# Patient Record
Sex: FEMALE | Race: OTHER | HISPANIC OR LATINO | URBAN - METROPOLITAN AREA
[De-identification: names, ages, dates, MRNs, and addresses within clinical notes are randomized per-mention and may not be internally consistent; named-entity substitution may affect disease eponyms.]

---

## 2021-02-11 ENCOUNTER — INPATIENT (INPATIENT)
Facility: HOSPITAL | Age: 71
LOS: 11 days | Discharge: HOME HEALTH | DRG: 234 | End: 2021-02-23
Attending: THORACIC SURGERY (CARDIOTHORACIC VASCULAR SURGERY) | Admitting: STUDENT IN AN ORGANIZED HEALTH CARE EDUCATION/TRAINING PROGRAM
Payer: MEDICAID

## 2021-02-11 VITALS
DIASTOLIC BLOOD PRESSURE: 81 MMHG | HEART RATE: 82 BPM | SYSTOLIC BLOOD PRESSURE: 147 MMHG | TEMPERATURE: 98 F | RESPIRATION RATE: 18 BRPM | OXYGEN SATURATION: 99 %

## 2021-02-11 DIAGNOSIS — I21.4 NON-ST ELEVATION (NSTEMI) MYOCARDIAL INFARCTION: ICD-10-CM

## 2021-02-11 DIAGNOSIS — Z90.710 ACQUIRED ABSENCE OF BOTH CERVIX AND UTERUS: Chronic | ICD-10-CM

## 2021-02-11 LAB
ANION GAP SERPL CALC-SCNC: 14 MMOL/L — SIGNIFICANT CHANGE UP (ref 5–17)
APTT BLD: 30.3 SEC — SIGNIFICANT CHANGE UP (ref 27.5–35.5)
BUN SERPL-MCNC: 21 MG/DL — SIGNIFICANT CHANGE UP (ref 7–23)
CALCIUM SERPL-MCNC: 9.9 MG/DL — SIGNIFICANT CHANGE UP (ref 8.4–10.5)
CHLORIDE SERPL-SCNC: 104 MMOL/L — SIGNIFICANT CHANGE UP (ref 96–108)
CO2 SERPL-SCNC: 22 MMOL/L — SIGNIFICANT CHANGE UP (ref 22–31)
CREAT SERPL-MCNC: 0.84 MG/DL — SIGNIFICANT CHANGE UP (ref 0.5–1.3)
GLUCOSE BLDC GLUCOMTR-MCNC: 154 MG/DL — HIGH (ref 70–99)
GLUCOSE BLDC GLUCOMTR-MCNC: 276 MG/DL — HIGH (ref 70–99)
GLUCOSE SERPL-MCNC: 287 MG/DL — HIGH (ref 70–99)
HCT VFR BLD CALC: 43.3 % — SIGNIFICANT CHANGE UP (ref 34.5–45)
HGB BLD-MCNC: 13.6 G/DL — SIGNIFICANT CHANGE UP (ref 11.5–15.5)
INR BLD: 1.11 RATIO — SIGNIFICANT CHANGE UP (ref 0.88–1.16)
MCHC RBC-ENTMCNC: 28.9 PG — SIGNIFICANT CHANGE UP (ref 27–34)
MCHC RBC-ENTMCNC: 31.4 GM/DL — LOW (ref 32–36)
MCV RBC AUTO: 92.1 FL — SIGNIFICANT CHANGE UP (ref 80–100)
NRBC # BLD: 0 /100 WBCS — SIGNIFICANT CHANGE UP (ref 0–0)
PLATELET # BLD AUTO: 238 K/UL — SIGNIFICANT CHANGE UP (ref 150–400)
POTASSIUM SERPL-MCNC: 4.1 MMOL/L — SIGNIFICANT CHANGE UP (ref 3.5–5.3)
POTASSIUM SERPL-SCNC: 4.1 MMOL/L — SIGNIFICANT CHANGE UP (ref 3.5–5.3)
PROTHROM AB SERPL-ACNC: 13.2 SEC — SIGNIFICANT CHANGE UP (ref 10.6–13.6)
RBC # BLD: 4.7 M/UL — SIGNIFICANT CHANGE UP (ref 3.8–5.2)
RBC # FLD: 12.5 % — SIGNIFICANT CHANGE UP (ref 10.3–14.5)
RH IG SCN BLD-IMP: POSITIVE — SIGNIFICANT CHANGE UP
SODIUM SERPL-SCNC: 140 MMOL/L — SIGNIFICANT CHANGE UP (ref 135–145)
WBC # BLD: 7.2 K/UL — SIGNIFICANT CHANGE UP (ref 3.8–10.5)
WBC # FLD AUTO: 7.2 K/UL — SIGNIFICANT CHANGE UP (ref 3.8–10.5)

## 2021-02-11 PROCEDURE — 93010 ELECTROCARDIOGRAM REPORT: CPT

## 2021-02-11 PROCEDURE — 99222 1ST HOSP IP/OBS MODERATE 55: CPT

## 2021-02-11 PROCEDURE — 93458 L HRT ARTERY/VENTRICLE ANGIO: CPT | Mod: 26

## 2021-02-11 PROCEDURE — 99152 MOD SED SAME PHYS/QHP 5/>YRS: CPT

## 2021-02-11 PROCEDURE — 99223 1ST HOSP IP/OBS HIGH 75: CPT | Mod: 25

## 2021-02-11 RX ORDER — GLUCAGON INJECTION, SOLUTION 0.5 MG/.1ML
1 INJECTION, SOLUTION SUBCUTANEOUS ONCE
Refills: 0 | Status: DISCONTINUED | OUTPATIENT
Start: 2021-02-11 | End: 2021-02-13

## 2021-02-11 RX ORDER — DEXTROSE 50 % IN WATER 50 %
15 SYRINGE (ML) INTRAVENOUS ONCE
Refills: 0 | Status: DISCONTINUED | OUTPATIENT
Start: 2021-02-11 | End: 2021-02-17

## 2021-02-11 RX ORDER — DEXTROSE 50 % IN WATER 50 %
12.5 SYRINGE (ML) INTRAVENOUS ONCE
Refills: 0 | Status: DISCONTINUED | OUTPATIENT
Start: 2021-02-11 | End: 2021-02-13

## 2021-02-11 RX ORDER — DEXTROSE 50 % IN WATER 50 %
25 SYRINGE (ML) INTRAVENOUS ONCE
Refills: 0 | Status: DISCONTINUED | OUTPATIENT
Start: 2021-02-11 | End: 2021-02-17

## 2021-02-11 RX ORDER — INSULIN LISPRO 100/ML
6 VIAL (ML) SUBCUTANEOUS
Refills: 0 | Status: DISCONTINUED | OUTPATIENT
Start: 2021-02-11 | End: 2021-02-13

## 2021-02-11 RX ORDER — ATORVASTATIN CALCIUM 80 MG/1
40 TABLET, FILM COATED ORAL AT BEDTIME
Refills: 0 | Status: DISCONTINUED | OUTPATIENT
Start: 2021-02-11 | End: 2021-02-17

## 2021-02-11 RX ORDER — LEVOTHYROXINE SODIUM 125 MCG
100 TABLET ORAL DAILY
Refills: 0 | Status: DISCONTINUED | OUTPATIENT
Start: 2021-02-11 | End: 2021-02-17

## 2021-02-11 RX ORDER — METOPROLOL TARTRATE 50 MG
25 TABLET ORAL
Refills: 0 | Status: DISCONTINUED | OUTPATIENT
Start: 2021-02-11 | End: 2021-02-13

## 2021-02-11 RX ORDER — INSULIN LISPRO 100/ML
VIAL (ML) SUBCUTANEOUS
Refills: 0 | Status: DISCONTINUED | OUTPATIENT
Start: 2021-02-11 | End: 2021-02-17

## 2021-02-11 RX ORDER — ATORVASTATIN CALCIUM 80 MG/1
1 TABLET, FILM COATED ORAL
Qty: 0 | Refills: 0 | DISCHARGE

## 2021-02-11 RX ORDER — METFORMIN HYDROCHLORIDE 850 MG/1
1 TABLET ORAL
Qty: 0 | Refills: 0 | DISCHARGE

## 2021-02-11 RX ORDER — INSULIN LISPRO 100/ML
VIAL (ML) SUBCUTANEOUS AT BEDTIME
Refills: 0 | Status: DISCONTINUED | OUTPATIENT
Start: 2021-02-11 | End: 2021-02-17

## 2021-02-11 RX ORDER — SODIUM CHLORIDE 9 MG/ML
1000 INJECTION, SOLUTION INTRAVENOUS
Refills: 0 | Status: DISCONTINUED | OUTPATIENT
Start: 2021-02-11 | End: 2021-02-13

## 2021-02-11 RX ORDER — DORZOLAMIDE HYDROCHLORIDE 20 MG/ML
1 SOLUTION/ DROPS OPHTHALMIC
Qty: 0 | Refills: 0 | DISCHARGE

## 2021-02-11 RX ORDER — LOSARTAN POTASSIUM 100 MG/1
50 TABLET, FILM COATED ORAL DAILY
Refills: 0 | Status: DISCONTINUED | OUTPATIENT
Start: 2021-02-11 | End: 2021-02-13

## 2021-02-11 RX ORDER — DEXTROSE 50 % IN WATER 50 %
25 SYRINGE (ML) INTRAVENOUS ONCE
Refills: 0 | Status: DISCONTINUED | OUTPATIENT
Start: 2021-02-11 | End: 2021-02-13

## 2021-02-11 RX ORDER — CYCLOBENZAPRINE HYDROCHLORIDE 10 MG/1
1 TABLET, FILM COATED ORAL
Qty: 0 | Refills: 0 | DISCHARGE

## 2021-02-11 RX ORDER — ASPIRIN/CALCIUM CARB/MAGNESIUM 324 MG
81 TABLET ORAL DAILY
Refills: 0 | Status: DISCONTINUED | OUTPATIENT
Start: 2021-02-11 | End: 2021-02-17

## 2021-02-11 RX ORDER — CYCLOBENZAPRINE HYDROCHLORIDE 10 MG/1
5 TABLET, FILM COATED ORAL AT BEDTIME
Refills: 0 | Status: DISCONTINUED | OUTPATIENT
Start: 2021-02-11 | End: 2021-02-17

## 2021-02-11 RX ORDER — DORZOLAMIDE HYDROCHLORIDE 20 MG/ML
1 SOLUTION/ DROPS OPHTHALMIC
Refills: 0 | Status: DISCONTINUED | OUTPATIENT
Start: 2021-02-11 | End: 2021-02-17

## 2021-02-11 RX ORDER — INSULIN LISPRO 100/ML
8 VIAL (ML) SUBCUTANEOUS
Refills: 0 | Status: DISCONTINUED | OUTPATIENT
Start: 2021-02-11 | End: 2021-02-13

## 2021-02-11 RX ORDER — INSULIN GLARGINE 100 [IU]/ML
8 INJECTION, SOLUTION SUBCUTANEOUS AT BEDTIME
Refills: 0 | Status: DISCONTINUED | OUTPATIENT
Start: 2021-02-11 | End: 2021-02-13

## 2021-02-11 RX ADMIN — Medication 25 MILLIGRAM(S): at 21:44

## 2021-02-11 RX ADMIN — Medication 1: at 21:44

## 2021-02-11 RX ADMIN — DORZOLAMIDE HYDROCHLORIDE 1 DROP(S): 20 SOLUTION/ DROPS OPHTHALMIC at 21:44

## 2021-02-11 RX ADMIN — ATORVASTATIN CALCIUM 40 MILLIGRAM(S): 80 TABLET, FILM COATED ORAL at 21:44

## 2021-02-11 RX ADMIN — INSULIN GLARGINE 8 UNIT(S): 100 INJECTION, SOLUTION SUBCUTANEOUS at 21:43

## 2021-02-11 NOTE — H&P CARDIOLOGY - HISTORY OF PRESENT ILLNESS
70 year old female (denies implanted devices) with significant PMHx of former 5 pack year cigarette smoker, T2DM (last HgA1c unknown, managed by endocrinologist in [place], denies complications), hypothyroidism, HTN, HLD admitted to [facility] on 2/8/2021 with NSTEMI and transferred to [facility] for LHC with Dr. Duggan. Upon admission to [facility], patient c/o chest pain that radiated to jaw and both arms that began 8 days ago. She had 4 episodes that began with mild exertion and progressively became worse and started with rest. Patient travels frequently back and forth from [place], most recent trip from [place] was 3 months ago. No troponin levels available in transfer documents. LHC revealed 80-90% stenosis to LAD, 80-90% stenosis to prox diag, 80-90% to prox Cx, 70-80 to mid/distal RCA and recommended for CTS eval for CABG.     2/11/2021 WBC 6.84, HGB 12.8, HCT 38.4, , , K 4.1, Cl 108, BUN 20, Cr 0.8, GFR >60  70 year old female (denies implanted devices) with significant PMHx of former 5 pack year cigarette smoker, T2DM (last HgA1c unknown, managed by endocrinologist in [place], denies complications), hypothyroidism, HTN, HLD admitted to [facility] on 2/8/2021 with NSTEMI and transferred to [facility] for LHC with Dr. Duggan. Upon admission to [facility], patient c/o chest pain that radiated to jaw and both arms that began 8 days ago. She had 4 episodes that began with mild exertion and progressively became worse and started with rest. Patient travels frequently back and forth from [place], most recent trip from [place] was 3 months ago. No troponin levels available in transfer documents. LHC revealed 80-90% stenosis to LAD, 80-90% stenosis to prox diag, 80-90% to prox Cx, 70-80 to mid/distal RCA and recommended for CTS eval for CABG.     2/11/2021 WBC 6.84, HGB 12.8, HCT 38.4, , , K 4.1, Cl 108, BUN 20, Cr 0.8, GFR >60        [place] [id number]

## 2021-02-11 NOTE — H&P CARDIOLOGY - ATTENDING COMMENTS
Patient with high risk factors and EKG showing diffuse ST depressions suspicious for global ischemia. Recommend cardiac catheterization.

## 2021-02-12 DIAGNOSIS — I10 ESSENTIAL (PRIMARY) HYPERTENSION: ICD-10-CM

## 2021-02-12 DIAGNOSIS — E11.9 TYPE 2 DIABETES MELLITUS WITHOUT COMPLICATIONS: ICD-10-CM

## 2021-02-12 DIAGNOSIS — I25.10 ATHEROSCLEROTIC HEART DISEASE OF NATIVE CORONARY ARTERY WITHOUT ANGINA PECTORIS: ICD-10-CM

## 2021-02-12 DIAGNOSIS — E78.5 HYPERLIPIDEMIA, UNSPECIFIED: ICD-10-CM

## 2021-02-12 LAB
A1C WITH ESTIMATED AVERAGE GLUCOSE RESULT: 9.2 % — HIGH (ref 4–5.6)
ALBUMIN SERPL ELPH-MCNC: 3.8 G/DL — SIGNIFICANT CHANGE UP (ref 3.3–5)
ALP SERPL-CCNC: 71 U/L — SIGNIFICANT CHANGE UP (ref 40–120)
ALT FLD-CCNC: 47 U/L — HIGH (ref 10–45)
ANION GAP SERPL CALC-SCNC: 12 MMOL/L — SIGNIFICANT CHANGE UP (ref 5–17)
APPEARANCE UR: CLEAR — SIGNIFICANT CHANGE UP
APTT BLD: 129.6 SEC — CRITICAL HIGH (ref 27.5–35.5)
APTT BLD: 163.8 SEC — CRITICAL HIGH (ref 27.5–35.5)
APTT BLD: 61.4 SEC — HIGH (ref 27.5–35.5)
AST SERPL-CCNC: 61 U/L — HIGH (ref 10–40)
BILIRUB SERPL-MCNC: 0.3 MG/DL — SIGNIFICANT CHANGE UP (ref 0.2–1.2)
BILIRUB UR-MCNC: NEGATIVE — SIGNIFICANT CHANGE UP
BUN SERPL-MCNC: 21 MG/DL — SIGNIFICANT CHANGE UP (ref 7–23)
CALCIUM SERPL-MCNC: 9.2 MG/DL — SIGNIFICANT CHANGE UP (ref 8.4–10.5)
CHLORIDE SERPL-SCNC: 105 MMOL/L — SIGNIFICANT CHANGE UP (ref 96–108)
CO2 SERPL-SCNC: 21 MMOL/L — LOW (ref 22–31)
COLOR SPEC: YELLOW — SIGNIFICANT CHANGE UP
CREAT SERPL-MCNC: 0.68 MG/DL — SIGNIFICANT CHANGE UP (ref 0.5–1.3)
DIFF PNL FLD: NEGATIVE — SIGNIFICANT CHANGE UP
ESTIMATED AVERAGE GLUCOSE: 217 MG/DL — HIGH (ref 68–114)
FIBRINOGEN PPP-MCNC: 675 MG/DL — HIGH (ref 290–520)
GLUCOSE BLDC GLUCOMTR-MCNC: 203 MG/DL — HIGH (ref 70–99)
GLUCOSE BLDC GLUCOMTR-MCNC: 271 MG/DL — HIGH (ref 70–99)
GLUCOSE BLDC GLUCOMTR-MCNC: 276 MG/DL — HIGH (ref 70–99)
GLUCOSE BLDC GLUCOMTR-MCNC: 349 MG/DL — HIGH (ref 70–99)
GLUCOSE SERPL-MCNC: 266 MG/DL — HIGH (ref 70–99)
GLUCOSE UR QL: ABNORMAL
HCT VFR BLD CALC: 37.8 % — SIGNIFICANT CHANGE UP (ref 34.5–45)
HGB BLD-MCNC: 12.3 G/DL — SIGNIFICANT CHANGE UP (ref 11.5–15.5)
KETONES UR-MCNC: SIGNIFICANT CHANGE UP
LEUKOCYTE ESTERASE UR-ACNC: NEGATIVE — SIGNIFICANT CHANGE UP
MCHC RBC-ENTMCNC: 29.9 PG — SIGNIFICANT CHANGE UP (ref 27–34)
MCHC RBC-ENTMCNC: 32.5 GM/DL — SIGNIFICANT CHANGE UP (ref 32–36)
MCV RBC AUTO: 91.7 FL — SIGNIFICANT CHANGE UP (ref 80–100)
MRSA PCR RESULT.: SIGNIFICANT CHANGE UP
NITRITE UR-MCNC: NEGATIVE — SIGNIFICANT CHANGE UP
NRBC # BLD: 0 /100 WBCS — SIGNIFICANT CHANGE UP (ref 0–0)
NT-PROBNP SERPL-SCNC: 35 PG/ML — SIGNIFICANT CHANGE UP (ref 0–300)
PA ADP PRP-ACNC: 94 PRU — LOW (ref 194–417)
PH UR: 6.5 — SIGNIFICANT CHANGE UP (ref 5–8)
PLATELET # BLD AUTO: 257 K/UL — SIGNIFICANT CHANGE UP (ref 150–400)
POTASSIUM SERPL-MCNC: 4.1 MMOL/L — SIGNIFICANT CHANGE UP (ref 3.5–5.3)
POTASSIUM SERPL-SCNC: 4.1 MMOL/L — SIGNIFICANT CHANGE UP (ref 3.5–5.3)
PROT SERPL-MCNC: 7.7 G/DL — SIGNIFICANT CHANGE UP (ref 6–8.3)
PROT UR-MCNC: ABNORMAL
RBC # BLD: 4.12 M/UL — SIGNIFICANT CHANGE UP (ref 3.8–5.2)
RBC # FLD: 12.7 % — SIGNIFICANT CHANGE UP (ref 10.3–14.5)
S AUREUS DNA NOSE QL NAA+PROBE: SIGNIFICANT CHANGE UP
SARS-COV-2 IGG SERPL QL IA: POSITIVE
SARS-COV-2 IGM SERPL IA-ACNC: 64 AU/ML — HIGH
SARS-COV-2 RNA SPEC QL NAA+PROBE: SIGNIFICANT CHANGE UP
SODIUM SERPL-SCNC: 138 MMOL/L — SIGNIFICANT CHANGE UP (ref 135–145)
SP GR SPEC: 1.03 — HIGH (ref 1.01–1.02)
T3 SERPL-MCNC: 98 NG/DL — SIGNIFICANT CHANGE UP (ref 80–200)
T4 AB SER-ACNC: 10.3 UG/DL — SIGNIFICANT CHANGE UP (ref 4.6–12)
TSH SERPL-MCNC: 6.26 UIU/ML — HIGH (ref 0.27–4.2)
UROBILINOGEN FLD QL: ABNORMAL
WBC # BLD: 8.61 K/UL — SIGNIFICANT CHANGE UP (ref 3.8–10.5)
WBC # FLD AUTO: 8.61 K/UL — SIGNIFICANT CHANGE UP (ref 3.8–10.5)

## 2021-02-12 PROCEDURE — 93306 TTE W/DOPPLER COMPLETE: CPT | Mod: 26

## 2021-02-12 PROCEDURE — 71046 X-RAY EXAM CHEST 2 VIEWS: CPT | Mod: 26

## 2021-02-12 PROCEDURE — 99253 IP/OBS CNSLTJ NEW/EST LOW 45: CPT

## 2021-02-12 PROCEDURE — 99231 SBSQ HOSP IP/OBS SF/LOW 25: CPT

## 2021-02-12 PROCEDURE — 93880 EXTRACRANIAL BILAT STUDY: CPT | Mod: 26

## 2021-02-12 RX ORDER — HEPARIN SODIUM 5000 [USP'U]/ML
INJECTION INTRAVENOUS; SUBCUTANEOUS
Qty: 25000 | Refills: 0 | Status: DISCONTINUED | OUTPATIENT
Start: 2021-02-12 | End: 2021-02-13

## 2021-02-12 RX ORDER — HEPARIN SODIUM 5000 [USP'U]/ML
3000 INJECTION INTRAVENOUS; SUBCUTANEOUS EVERY 6 HOURS
Refills: 0 | Status: DISCONTINUED | OUTPATIENT
Start: 2021-02-12 | End: 2021-02-13

## 2021-02-12 RX ORDER — HEPARIN SODIUM 5000 [USP'U]/ML
6000 INJECTION INTRAVENOUS; SUBCUTANEOUS EVERY 6 HOURS
Refills: 0 | Status: DISCONTINUED | OUTPATIENT
Start: 2021-02-12 | End: 2021-02-13

## 2021-02-12 RX ADMIN — Medication 6 UNIT(S): at 07:50

## 2021-02-12 RX ADMIN — Medication 3: at 07:51

## 2021-02-12 RX ADMIN — Medication 100 MICROGRAM(S): at 05:44

## 2021-02-12 RX ADMIN — HEPARIN SODIUM 0 UNIT(S)/HR: 5000 INJECTION INTRAVENOUS; SUBCUTANEOUS at 07:10

## 2021-02-12 RX ADMIN — DORZOLAMIDE HYDROCHLORIDE 1 DROP(S): 20 SOLUTION/ DROPS OPHTHALMIC at 09:38

## 2021-02-12 RX ADMIN — INSULIN GLARGINE 8 UNIT(S): 100 INJECTION, SOLUTION SUBCUTANEOUS at 22:17

## 2021-02-12 RX ADMIN — Medication 8 UNIT(S): at 12:03

## 2021-02-12 RX ADMIN — HEPARIN SODIUM 1100 UNIT(S)/HR: 5000 INJECTION INTRAVENOUS; SUBCUTANEOUS at 08:16

## 2021-02-12 RX ADMIN — Medication 2: at 17:42

## 2021-02-12 RX ADMIN — HEPARIN SODIUM 900 UNIT(S)/HR: 5000 INJECTION INTRAVENOUS; SUBCUTANEOUS at 17:15

## 2021-02-12 RX ADMIN — DORZOLAMIDE HYDROCHLORIDE 1 DROP(S): 20 SOLUTION/ DROPS OPHTHALMIC at 22:17

## 2021-02-12 RX ADMIN — HEPARIN SODIUM 1300 UNIT(S)/HR: 5000 INJECTION INTRAVENOUS; SUBCUTANEOUS at 00:19

## 2021-02-12 RX ADMIN — Medication 25 MILLIGRAM(S): at 05:44

## 2021-02-12 RX ADMIN — Medication 1: at 22:18

## 2021-02-12 RX ADMIN — HEPARIN SODIUM 900 UNIT(S)/HR: 5000 INJECTION INTRAVENOUS; SUBCUTANEOUS at 23:48

## 2021-02-12 RX ADMIN — ATORVASTATIN CALCIUM 40 MILLIGRAM(S): 80 TABLET, FILM COATED ORAL at 22:17

## 2021-02-12 RX ADMIN — Medication 81 MILLIGRAM(S): at 05:45

## 2021-02-12 RX ADMIN — Medication 30 MILLILITER(S): at 22:36

## 2021-02-12 RX ADMIN — LOSARTAN POTASSIUM 50 MILLIGRAM(S): 100 TABLET, FILM COATED ORAL at 05:44

## 2021-02-12 RX ADMIN — Medication 25 MILLIGRAM(S): at 17:52

## 2021-02-12 RX ADMIN — Medication 4: at 11:59

## 2021-02-12 RX ADMIN — HEPARIN SODIUM 0 UNIT(S)/HR: 5000 INJECTION INTRAVENOUS; SUBCUTANEOUS at 16:13

## 2021-02-12 NOTE — PROGRESS NOTE ADULT - PROBLEM SELECTOR PLAN 1
S/p LHC ALMA x S/P LHC revealed 80-90% stenosis to LAD, 80-90% stenosis to prox diag, 80-90% to prox Cx, 70-80 to mid/distal RCA and recommended for CTS eval for CABG.

## 2021-02-12 NOTE — CONSULT NOTE ADULT - PROBLEM SELECTOR RECOMMENDATION 9
Pre op Cardiac surgery work up in progress   Continue with ASA 81 mg PO daily   Continue with Lopressor 25 mg PO BID   Continue with Atorvastatin 40 mg PO HS   TTE   MRSA/ MSSA Nasal swab   Check P2Y12 in AM   Type and Screen x 2 pending   Continue on Heparin gtt for ACS   Plan for Cardiac Surgery in with Dr. Adhikari on Wednesday 2/17 Pre op Cardiac surgery work up in progress   Continue with ASA 81 mg PO daily   Continue with Lopressor 25 mg PO BID   Continue with Atorvastatin 40 mg PO HS   TTE   MRSA/ MSSA Nasal swab   Check P2Y12 in AM   Type and Screen x 2 pending   Continue on Heparin gtt for ACS   Plan for Cardiac Surgery in with Dr. Adhikari on Wednesday 2/17 2nd case

## 2021-02-12 NOTE — PROGRESS NOTE ADULT - ASSESSMENT
70 year old female (denies implanted devices) with significant PMHx of former 5 pack year cigarette smoker, T2DM (last HgA1c unknown, managed by endocrinologist in [place], denies complications), hypothyroidism, HTN, HLD admitted to [facility] on 2/8/2021 with NSTEMI and transferred to [facility] for LHC with Dr. Duggan.   S/P LHC revealed 80-90% stenosis to LAD, 80-90% stenosis to prox diag, 80-90% to prox Cx, 70-80 to mid/distal RCA and recommended for CTS eval for CABG.

## 2021-02-12 NOTE — CONSULT NOTE ADULT - SUBJECTIVE AND OBJECTIVE BOX
History of Present Illness:    69 yo Female (denies implanted devices) with significant PMHx of former 5 pack year cigarette smoker, T2DM (last HgA1c unknown, managed by endocrinologist in [place], denies complications), hypothyroidism, HTN, HLD admitted to [facility] on 2/8/2021 with NSTEMI and transferred to [facility] for LHC with Dr. Duggan. Upon admission to [facility], patient c/o chest pain that radiated to jaw and both arms that began 8 days ago. She had 4 episodes that began with mild exertion and progressively became worse and started with rest. Patient travels frequently back and forth from [place], most recent trip from [place] was 3 months ago. No troponin levels available in transfer documents. LHC revealed 80-90% stenosis to LAD, 80-90% stenosis to prox diag, 80-90% to prox Cx, 70-80 to mid/distal RCA. CTS consult called to evaluate patient for cardiac surgery.    	  Past Medical History  HLD (hyperlipidemia)    Hypothyroidism    DM (diabetes mellitus)    HTN (hypertension)    Past Surgical History  S/P VLADISLAV (total abdominal hysterectomy)    MEDICATIONS  (STANDING):  aspirin enteric coated 81 milliGRAM(s) Oral daily  atorvastatin 40 milliGRAM(s) Oral at bedtime  dorzolamide 2% Ophthalmic Solution 1 Drop(s) Both EYES <User Schedule>  glucagon  Injectable 1 milliGRAM(s) IntraMuscular once  heparin  Infusion.  Unit(s)/Hr (13 mL/Hr) IV Continuous <Continuous>  insulin glargine Injectable (LANTUS) 8 Unit(s) SubCutaneous at bedtime  insulin lispro (ADMELOG) corrective regimen sliding scale SubCutaneous three times a day before meals  insulin lispro (ADMELOG) corrective regimen sliding scale SubCutaneous at bedtime  insulin lispro Injectable (ADMELOG) 6 Unit(s) SubCutaneous before breakfast  insulin lispro Injectable (ADMELOG) 8 Unit(s) SubCutaneous before lunch  levothyroxine 100 MICROGram(s) Oral daily  losartan 50 milliGRAM(s) Oral daily  metoprolol tartrate 25 milliGRAM(s) Oral two times a day    MEDICATIONS  (PRN):  cyclobenzaprine 5 milliGRAM(s) Oral at bedtime PRN Muscle Spasm  heparin Injectable 6000 Unit(s) IV Push every 6 hours PRN For aPTT less than 40  heparin Injectable 3000 Unit(s) IV Push every 6 hours PRN For aPTT between 40 - 57    Antiplatelet therapy: Brilinta Load                         Last dose/amt: 2/11/21     Allergies: No Known Allergies    SOCIAL HISTORY:  Former Smoker: [X ] Yes  [ ] No        PACK YEARS:  0.5 x 10 years / 5 PPY                      WHEN QUIT? 30 years ago   ETOH use: [ ] Yes  [X ] No              FREQUENCY / QUANTITY:  Ilicit Drug use:  [ ] Yes  [x ] No  Occupation:  Live with:   Assist device use: Denies     Relevant Family History  FAMILY HISTORY:  FH: heart disease (Father)    Review of Systems  GENERAL:  Fevers[] chills[] sweats[] fatigue[] weight loss[] weight gain []                                        NEURO:  parathesias[] seizures []  syncope []  confusion []                                                                                  EYES: glasses[]  blurry vision[]  discharge[] pain[] glaucoma []                                                                            ENMT:  difficulty hearing []  vertigo[]  dysphagia[] epistaxis[] recent dental work []                                      CV:  chest pain[] palpitations[] PONCE [] diaphoresis [] edema[]                                                                                             RESPIRATORY:  wheezing[] SOB[] cough [] sputum[] hemoptysis[]                                                                    GI:  nausea[]  vomiting []  diarrhea[] constipation [] melena []                                                                        : hematuria[ ]  dysuria[ ] urgency[] incontinence[]                                                                                              MUSCULOSKELETAL  arthritis[ ]  joint swelling [ ] muscle weakness [ ]                                                                  SKIN/BREAST:  rash[ ] itching [ ]  hair loss[ ] masses[ ]                                                                                                PSYCH:  dementia [ ] depression [ ] anxiety[ ]                                                                                                                  HEME/LYMPH:  bruises easily[ ] enlarged lymph nodes[ ] tender lymph nodes[ ]                                                 ENDOCRINE:  cold intolerance[ ] heat intolerance[ ] polydipsia[ ]                                                                              PHYSICAL EXAM  Vital Signs Last 24 Hrs  T(C): 36.9 (12 Feb 2021 06:12), Max: 36.9 (12 Feb 2021 06:12)  T(F): 98.5 (12 Feb 2021 06:12), Max: 98.5 (12 Feb 2021 06:12)  HR: 79 (12 Feb 2021 06:12) (72 - 88)  BP: 141/75 (12 Feb 2021 06:12) (117/104 - 152/74)  BP(mean): --  RR: 16 (12 Feb 2021 06:12) (16 - 18)  SpO2: 97% (12 Feb 2021 06:12) (96% - 100%)    General: Well nourished, well developed, no acute distress.                                                         Neuro: Normal exam oriented to person/place & time with no focal motor or sensory  deficits.                    Eyes: Normal exam of conjunctiva & lids, pupils equally reactive.   ENT: Normal exam of nasal/oral mucosa with absence of cyanosis.   Neck: Normal exam of jugular veins, trachea & thyroid.   Chest: Normal lung exam with good air movement absence of wheezes, rales, or rhonchi:                                                                          CV:  Auscultation: normal [ ] S3[ ] S4[ ] Irregular [ ] Rub[ ] Clicks[ ]  Murmurs none:[ ]systolic [ ]  diastolic [ ] holosystolic [ ]  Carotids: No Bruits[ ] Other____________ Abdominal Aorta: normal [ ] nonpalpable[ ]                                                                         GI: Normal exam of abdomen, liver & spleen with no noted masses or tenderness.  (+) BS X 4 Quadrants, Nontender / Non Distended.                                                                                             Extremities: Normal no evidence of cyanosis or deformity Edema: none[ ]trace[ ]1+[ ]2+[ ]3+[ ]4+[ ]  Lower Extremity Pulses: Right[ ] Left[ ]Varicosities[ ]  SKIN : Normal exam to inspection & palpation.                                                           LABS:                        12.3   8.61  )-----------( 257      ( 12 Feb 2021 06:25 )             37.8     02-12    138  |  105  |  21  ----------------------------<  266<H>  4.1   |  21<L>  |  0.68    Ca    9.2      12 Feb 2021 06:25    TPro  7.7  /  Alb  3.8  /  TBili  0.3  /  DBili  x   /  AST  61<H>  /  ALT  47<H>  /  AlkPhos  71  02-12    PT/INR - ( 11 Feb 2021 23:25 )   PT: 13.2 sec;   INR: 1.11 ratio         PTT - ( 12 Feb 2021 06:25 )  PTT:163.8 sec    Cardiac Cath: from: Cardiac Cath Lab - Adult (02.11.21 @ 17:23) >  LM:   --  LM: Angiography showed minor luminal irregularities with no flow  limiting lesions.  LAD:   --  Mid LAD: There was a 70 % stenosis.  --  Distal LAD: There was a 80% stenosis.  --  D1: There was a 90 % stenosis at the ostium of the vessel segment.  CX:   --  Proximal circumflex: There was a 70 % stenosis.  RCA:   --  Distal RCA: There was a 90 % stenosis.    TTE / JUAN CARLOS:           History of Present Illness:    71 yo Female (denies implanted devices) with significant PMHx of former 5 pack year cigarette smoker, T2DM (last HgA1c unknown, managed by endocrinologist in [place], denies complications), hypothyroidism, HTN, HLD admitted to [facility] on 2/8/2021 with NSTEMI and transferred to [facility] for LHC with Dr. Duggan. Upon admission to [facility], patient c/o chest pain that radiated to jaw and both arms that began 8 days ago. She had 4 episodes that began with mild exertion and progressively became worse and started with rest. Patient travels frequently back and forth from [place], most recent trip from [place] was 3 months ago. No troponin levels available in transfer documents. LHC revealed 80-90% stenosis to LAD, 80-90% stenosis to prox diag, 80-90% to prox Cx, 70-80 to mid/distal RCA. CTS consult called to evaluate patient for cardiac surgery.    	  Past Medical History  HLD (hyperlipidemia)    Hypothyroidism    DM (diabetes mellitus)    HTN (hypertension)    Past Surgical History  S/P VLADISLAV (total abdominal hysterectomy)    MEDICATIONS  (STANDING):  aspirin enteric coated 81 milliGRAM(s) Oral daily  atorvastatin 40 milliGRAM(s) Oral at bedtime  dorzolamide 2% Ophthalmic Solution 1 Drop(s) Both EYES <User Schedule>  glucagon  Injectable 1 milliGRAM(s) IntraMuscular once  heparin  Infusion.  Unit(s)/Hr (13 mL/Hr) IV Continuous <Continuous>  insulin glargine Injectable (LANTUS) 8 Unit(s) SubCutaneous at bedtime  insulin lispro (ADMELOG) corrective regimen sliding scale SubCutaneous three times a day before meals  insulin lispro (ADMELOG) corrective regimen sliding scale SubCutaneous at bedtime  insulin lispro Injectable (ADMELOG) 6 Unit(s) SubCutaneous before breakfast  insulin lispro Injectable (ADMELOG) 8 Unit(s) SubCutaneous before lunch  levothyroxine 100 MICROGram(s) Oral daily  losartan 50 milliGRAM(s) Oral daily  metoprolol tartrate 25 milliGRAM(s) Oral two times a day    MEDICATIONS  (PRN):  cyclobenzaprine 5 milliGRAM(s) Oral at bedtime PRN Muscle Spasm  heparin Injectable 6000 Unit(s) IV Push every 6 hours PRN For aPTT less than 40  heparin Injectable 3000 Unit(s) IV Push every 6 hours PRN For aPTT between 40 - 57    Antiplatelet therapy: Brilinta Load                         Last dose/amt: 2/11/21     Allergies: No Known Allergies    SOCIAL HISTORY:  Former Smoker: [X ] Yes  [ ] No        PACK YEARS:  0.5 x 10 years / 5 PCK/YRS                     WHEN QUIT? 30 years ago   ETOH use: [ ] Yes  [X ] No              FREQUENCY / QUANTITY:  Ilicit Drug use:  [ ] Yes  [x ] No  Occupation: Retired Nurse  Live with: Adult children   Assist device use: Denies     Relevant Family History  FAMILY HISTORY:  FH: heart disease (Father)    Review of Systems  GENERAL:  Fevers[] chills[] sweats[] fatigue[] weight loss[] weight gain []                                        NEURO:  parathesias[] seizures []  syncope []  confusion []  (+) B/L Neck pain                                                                              EYES: glasses[]  blurry vision[]  discharge[] pain[] glaucoma []                                                                            ENMT:  difficulty hearing []  vertigo[]  dysphagia[] epistaxis[] recent dental work []                                      CV:  chest pain[X] palpitations[] PONCE [] diaphoresis [] edema[]                                                                                             RESPIRATORY:  wheezing[] SOB[] cough [] sputum[] hemoptysis[]                                                                    GI:  nausea[X]  vomiting []  diarrhea[] constipation [] melena []                                                                        : hematuria[ ]  dysuria[ ] urgency[] incontinence[]                                                                                              MUSCULOSKELETAL  arthritis[ ]  joint swelling [ ] muscle weakness [ ]                                                                  SKIN/BREAST:  rash[ ] itching [ ]  hair loss[ ] masses[ ]                                                                                                PSYCH:  dementia [ ] depression [ ] anxiety[ ]                                                                                                                  HEME/LYMPH:  bruises easily[ ] enlarged lymph nodes[ ] tender lymph nodes[ ]                                                 ENDOCRINE:  cold intolerance[ ] heat intolerance[ ] polydipsia[ ]                                                                              PHYSICAL EXAM  Vital Signs Last 24 Hrs  T(C): 36.9 (12 Feb 2021 06:12), Max: 36.9 (12 Feb 2021 06:12)  T(F): 98.5 (12 Feb 2021 06:12), Max: 98.5 (12 Feb 2021 06:12)  HR: 79 (12 Feb 2021 06:12) (72 - 88)  BP: 141/75 (12 Feb 2021 06:12) (117/104 - 152/74)  BP(mean): --  RR: 16 (12 Feb 2021 06:12) (16 - 18)  SpO2: 97% (12 Feb 2021 06:12) (96% - 100%)    General: Well nourished, well developed, no acute distress.                                                         Neuro: Normal exam oriented to person/place & time with no focal motor or sensory  deficits.                    Eyes: Normal exam of conjunctiva & lids, pupils equally reactive.   ENT: Normal exam of nasal/oral mucosa with absence of cyanosis. Poor dentition; Missing teeth   Neck: Normal exam of jugular veins, trachea & thyroid.   Chest: Normal lung exam with good air movement absence of wheezes, rales, or rhonchi:                                                                          CV:  Auscultation: normal [X ] S3[ ] S4[ ] Irregular [ ] Rub[ ] Clicks[ ]  Murmurs none:[X ]systolic [ ]  diastolic [ ] holosystolic [ ]  Carotids: No Bruits[- ] Other____________ Abdominal Aorta: normal [X ] nonpalpable [X]                                                                         GI: Normal exam of abdomen, liver & spleen with no noted masses or tenderness.  (+) BS X 4 Quadrants, Nontender / Non Distended.                                                                                             Extremities: Normal no evidence of cyanosis or deformity Edema: none[x ]trace[ ]1+[ ]2+[ ]3+[ ]4+[ ]  Lower Extremity Pulses: Right[+2 ] Left[ +2]Varicosities[- ]  SKIN : Normal exam to inspection & palpation.                                                           LABS:                        12.3   8.61  )-----------( 257      ( 12 Feb 2021 06:25 )             37.8     02-12    138  |  105  |  21  ----------------------------<  266<H>  4.1   |  21<L>  |  0.68    Ca    9.2      12 Feb 2021 06:25    TPro  7.7  /  Alb  3.8  /  TBili  0.3  /  DBili  x   /  AST  61<H>  /  ALT  47<H>  /  AlkPhos  71  02-12    PT/INR - ( 11 Feb 2021 23:25 )   PT: 13.2 sec;   INR: 1.11 ratio         PTT - ( 12 Feb 2021 06:25 )  PTT:163.8 sec    Cardiac Cath: from: Cardiac Cath Lab - Adult (02.11.21 @ 17:23) >  LM:   --  LM: Angiography showed minor luminal irregularities with no flow  limiting lesions.  LAD:   --  Mid LAD: There was a 70 % stenosis.  --  Distal LAD: There was a 80% stenosis.  --  D1: There was a 90 % stenosis at the ostium of the vessel segment.  CX:   --  Proximal circumflex: There was a 70 % stenosis.  RCA:   --  Distal RCA: There was a 90 % stenosis.               History of Present Illness:    71 yo Female (denies implanted devices) with significant PMHx of former 5 pack year cigarette smoker, T2DM (last HgA1c unknown, managed by endocrinologist in [place], denies complications), hypothyroidism, HTN, HLD admitted to [facility] on 2/8/2021 with NSTEMI and transferred to [facility] for LHC with Dr. Duggan. Upon admission to [facility], patient c/o chest pain that radiated to jaw and both arms that began 8 days ago. She had 4 episodes that began with mild exertion and progressively became worse and started with rest. Patient travels frequently back and forth from [place], most recent trip from [place] was 3 months ago. No troponin levels available in transfer documents. LHC revealed 80-90% stenosis to LAD, 80-90% stenosis to prox diag, 80-90% to prox Cx, 70-80 to mid/distal RCA. CTS consult called to evaluate patient for cardiac surgery.    	  Past Medical History  HLD (hyperlipidemia)    Hypothyroidism    DM (diabetes mellitus)    HTN (hypertension)    Past Surgical History  S/P VLADISLAV (total abdominal hysterectomy)    MEDICATIONS  (STANDING):  aspirin enteric coated 81 milliGRAM(s) Oral daily  atorvastatin 40 milliGRAM(s) Oral at bedtime  dorzolamide 2% Ophthalmic Solution 1 Drop(s) Both EYES <User Schedule>  glucagon  Injectable 1 milliGRAM(s) IntraMuscular once  heparin  Infusion.  Unit(s)/Hr (13 mL/Hr) IV Continuous <Continuous>  insulin glargine Injectable (LANTUS) 8 Unit(s) SubCutaneous at bedtime  insulin lispro (ADMELOG) corrective regimen sliding scale SubCutaneous three times a day before meals  insulin lispro (ADMELOG) corrective regimen sliding scale SubCutaneous at bedtime  insulin lispro Injectable (ADMELOG) 6 Unit(s) SubCutaneous before breakfast  insulin lispro Injectable (ADMELOG) 8 Unit(s) SubCutaneous before lunch  levothyroxine 100 MICROGram(s) Oral daily  losartan 50 milliGRAM(s) Oral daily  metoprolol tartrate 25 milliGRAM(s) Oral two times a day    MEDICATIONS  (PRN):  cyclobenzaprine 5 milliGRAM(s) Oral at bedtime PRN Muscle Spasm  heparin Injectable 6000 Unit(s) IV Push every 6 hours PRN For aPTT less than 40  heparin Injectable 3000 Unit(s) IV Push every 6 hours PRN For aPTT between 40 - 57    Antiplatelet therapy: Brilinta Load                         Last dose/amt: 2/11/21     Allergies: No Known Allergies    SOCIAL HISTORY:  Former Smoker: [X ] Yes  [ ] No        PACK YEARS:  0.5 x 10 years / 5 PCK/YRS                     WHEN QUIT? 30 years ago   ETOH use: [ ] Yes  [X ] No              FREQUENCY / QUANTITY:  Ilicit Drug use:  [ ] Yes  [x ] No  Occupation: Retired Nurse  Live with: Adult children   Assist device use: Denies     Relevant Family History  FAMILY HISTORY:  FH: heart disease (Father) / CVA (Mother)     Review of Systems  GENERAL:  Fevers[] chills[] sweats[] fatigue[] weight loss[] weight gain []                                        NEURO:  parathesias[] seizures []  syncope []  confusion []  (+) B/L Neck pain                                                                              EYES: glasses[]  blurry vision[]  discharge[] pain[] glaucoma []                                                                            ENMT:  difficulty hearing []  vertigo[]  dysphagia[] epistaxis[] recent dental work []                                      CV:  chest pain[X] palpitations[] PONCE [] diaphoresis [] edema[]                                                                                             RESPIRATORY:  wheezing[] SOB[] cough [] sputum[] hemoptysis[]                                                                    GI:  nausea[X]  vomiting []  diarrhea[] constipation [] melena []                                                                        : hematuria[ ]  dysuria[ ] urgency[] incontinence[]                                                                                              MUSCULOSKELETAL  arthritis[ ]  joint swelling [ ] muscle weakness [ ]                                                                  SKIN/BREAST:  rash[ ] itching [ ]  hair loss[ ] masses[ ]                                                                                                PSYCH:  dementia [ ] depression [ ] anxiety[ ]                                                                                                                  HEME/LYMPH:  bruises easily[ ] enlarged lymph nodes[ ] tender lymph nodes[ ]                                                 ENDOCRINE:  cold intolerance[ ] heat intolerance[ ] polydipsia[ ]                                                                              PHYSICAL EXAM  Vital Signs Last 24 Hrs  T(C): 36.9 (12 Feb 2021 06:12), Max: 36.9 (12 Feb 2021 06:12)  T(F): 98.5 (12 Feb 2021 06:12), Max: 98.5 (12 Feb 2021 06:12)  HR: 79 (12 Feb 2021 06:12) (72 - 88)  BP: 141/75 (12 Feb 2021 06:12) (117/104 - 152/74)  BP(mean): --  RR: 16 (12 Feb 2021 06:12) (16 - 18)  SpO2: 97% (12 Feb 2021 06:12) (96% - 100%)    General: Well nourished, well developed, no acute distress.                                                         Neuro: Normal exam oriented to person/place & time with no focal motor or sensory  deficits.                    Eyes: Normal exam of conjunctiva & lids, pupils equally reactive.   ENT: Normal exam of nasal/oral mucosa with absence of cyanosis. Poor dentition; Missing teeth   Neck: Normal exam of jugular veins, trachea & thyroid.   Chest: Normal lung exam with good air movement absence of wheezes, rales, or rhonchi:                                                                          CV:  Auscultation: normal [X ] S3[ ] S4[ ] Irregular [ ] Rub[ ] Clicks[ ]  Murmurs none:[X ]systolic [ ]  diastolic [ ] holosystolic [ ]  Carotids: No Bruits[- ] Other____________ Abdominal Aorta: normal [X ] nonpalpable [X]                                                                         GI: Normal exam of abdomen, liver & spleen with no noted masses or tenderness.  (+) BS X 4 Quadrants, Nontender / Non Distended.                                                                                             Extremities: Normal no evidence of cyanosis or deformity Edema: none[x ]trace[ ]1+[ ]2+[ ]3+[ ]4+[ ]  Lower Extremity Pulses: Right[+2 ] Left[ +2]Varicosities[- ]  SKIN : Normal exam to inspection & palpation.                                                           LABS:                        12.3   8.61  )-----------( 257      ( 12 Feb 2021 06:25 )             37.8     02-12    138  |  105  |  21  ----------------------------<  266<H>  4.1   |  21<L>  |  0.68    Ca    9.2      12 Feb 2021 06:25    TPro  7.7  /  Alb  3.8  /  TBili  0.3  /  DBili  x   /  AST  61<H>  /  ALT  47<H>  /  AlkPhos  71  02-12    PT/INR - ( 11 Feb 2021 23:25 )   PT: 13.2 sec;   INR: 1.11 ratio         PTT - ( 12 Feb 2021 06:25 )  PTT:163.8 sec    Cardiac Cath: from: Cardiac Cath Lab - Adult (02.11.21 @ 17:23) >  LM:   --  LM: Angiography showed minor luminal irregularities with no flow  limiting lesions.  LAD:   --  Mid LAD: There was a 70 % stenosis.  --  Distal LAD: There was a 80% stenosis.  --  D1: There was a 90 % stenosis at the ostium of the vessel segment.  CX:   --  Proximal circumflex: There was a 70 % stenosis.  RCA:   --  Distal RCA: There was a 90 % stenosis.

## 2021-02-12 NOTE — CONSULT NOTE ADULT - ASSESSMENT
71 yo Female (denies implanted devices) with significant PMHx of former 5 pack year cigarette smoker, T2DM (last HgA1c unknown, managed by endocrinologist in [place], denies complications), hypothyroidism, HTN, HLD admitted to [facility] on 2/8/2021 with NSTEMI and transferred to [facility] 2/11/21 for LHC with Dr. Duggan.  LHC revealed 80-90% stenosis to LAD, 80-90% stenosis to prox diag, 80-90% to prox Cx, 70-80 to mid/distal RCA. CTS consult called to evaluate patient for cardiac surgery.      2/12 Pre op Cardiac Surgery in progress.     71 yo Female (denies implanted devices) with significant PMHx of former 5 pack year cigarette smoker, T2DM (last HgA1c unknown, managed by endocrinologist in [place], denies complications), hypothyroidism, HTN, HLD admitted to [facility] on 2/8/2021 with NSTEMI and transferred to [facility] 2/11/21 for LHC with Dr. Duggan.  LHC revealed 80-90% stenosis to LAD, 80-90% stenosis to prox diag, 80-90% to prox Cx, 70-80 to mid/distal RCA. CTS consult called to evaluate patient for cardiac surgery.      2/12 Pre op Cardiac Surgery in progress. D/C Brilinta.  Check P2Y12. Tentatively scheduled for Cardiac surgery with Dr. Adhikari on Wednesday 2nd case 2/17.

## 2021-02-13 DIAGNOSIS — E03.9 HYPOTHYROIDISM, UNSPECIFIED: ICD-10-CM

## 2021-02-13 LAB
ALBUMIN SERPL ELPH-MCNC: 3.6 G/DL — SIGNIFICANT CHANGE UP (ref 3.3–5)
ALP SERPL-CCNC: 73 U/L — SIGNIFICANT CHANGE UP (ref 40–120)
ALT FLD-CCNC: 50 U/L — HIGH (ref 10–45)
ANION GAP SERPL CALC-SCNC: 12 MMOL/L — SIGNIFICANT CHANGE UP (ref 5–17)
APTT BLD: 32 SEC — SIGNIFICANT CHANGE UP (ref 27.5–35.5)
APTT BLD: 54.3 SEC — HIGH (ref 27.5–35.5)
APTT BLD: >200 SEC — CRITICAL HIGH (ref 27.5–35.5)
APTT BLD: >200 SEC — CRITICAL HIGH (ref 27.5–35.5)
AST SERPL-CCNC: 51 U/L — HIGH (ref 10–40)
BILIRUB SERPL-MCNC: 0.2 MG/DL — SIGNIFICANT CHANGE UP (ref 0.2–1.2)
BUN SERPL-MCNC: 23 MG/DL — SIGNIFICANT CHANGE UP (ref 7–23)
CALCIUM SERPL-MCNC: 9.6 MG/DL — SIGNIFICANT CHANGE UP (ref 8.4–10.5)
CHLORIDE SERPL-SCNC: 104 MMOL/L — SIGNIFICANT CHANGE UP (ref 96–108)
CK MB BLD-MCNC: 3.2 % — SIGNIFICANT CHANGE UP (ref 0–3.5)
CK MB CFR SERPL CALC: 1.8 NG/ML — SIGNIFICANT CHANGE UP (ref 0–3.8)
CK SERPL-CCNC: 57 U/L — SIGNIFICANT CHANGE UP (ref 25–170)
CO2 SERPL-SCNC: 20 MMOL/L — LOW (ref 22–31)
CREAT SERPL-MCNC: 0.76 MG/DL — SIGNIFICANT CHANGE UP (ref 0.5–1.3)
GLUCOSE BLDC GLUCOMTR-MCNC: 158 MG/DL — HIGH (ref 70–99)
GLUCOSE BLDC GLUCOMTR-MCNC: 251 MG/DL — HIGH (ref 70–99)
GLUCOSE BLDC GLUCOMTR-MCNC: 261 MG/DL — HIGH (ref 70–99)
GLUCOSE BLDC GLUCOMTR-MCNC: 324 MG/DL — HIGH (ref 70–99)
GLUCOSE SERPL-MCNC: 281 MG/DL — HIGH (ref 70–99)
HCT VFR BLD CALC: 37.8 % — SIGNIFICANT CHANGE UP (ref 34.5–45)
HGB BLD-MCNC: 12 G/DL — SIGNIFICANT CHANGE UP (ref 11.5–15.5)
MCHC RBC-ENTMCNC: 29.1 PG — SIGNIFICANT CHANGE UP (ref 27–34)
MCHC RBC-ENTMCNC: 31.7 GM/DL — LOW (ref 32–36)
MCV RBC AUTO: 91.7 FL — SIGNIFICANT CHANGE UP (ref 80–100)
NRBC # BLD: 0 /100 WBCS — SIGNIFICANT CHANGE UP (ref 0–0)
PLATELET # BLD AUTO: 247 K/UL — SIGNIFICANT CHANGE UP (ref 150–400)
POTASSIUM SERPL-MCNC: 4 MMOL/L — SIGNIFICANT CHANGE UP (ref 3.5–5.3)
POTASSIUM SERPL-SCNC: 4 MMOL/L — SIGNIFICANT CHANGE UP (ref 3.5–5.3)
PROT SERPL-MCNC: 7.2 G/DL — SIGNIFICANT CHANGE UP (ref 6–8.3)
RBC # BLD: 4.12 M/UL — SIGNIFICANT CHANGE UP (ref 3.8–5.2)
RBC # FLD: 12.5 % — SIGNIFICANT CHANGE UP (ref 10.3–14.5)
SODIUM SERPL-SCNC: 136 MMOL/L — SIGNIFICANT CHANGE UP (ref 135–145)
TROPONIN T, HIGH SENSITIVITY RESULT: <6 NG/L — SIGNIFICANT CHANGE UP (ref 0–51)
WBC # BLD: 7.18 K/UL — SIGNIFICANT CHANGE UP (ref 3.8–10.5)
WBC # FLD AUTO: 7.18 K/UL — SIGNIFICANT CHANGE UP (ref 3.8–10.5)

## 2021-02-13 PROCEDURE — 99232 SBSQ HOSP IP/OBS MODERATE 35: CPT

## 2021-02-13 PROCEDURE — 93010 ELECTROCARDIOGRAM REPORT: CPT

## 2021-02-13 RX ORDER — INSULIN LISPRO 100/ML
9 VIAL (ML) SUBCUTANEOUS
Refills: 0 | Status: DISCONTINUED | OUTPATIENT
Start: 2021-02-13 | End: 2021-02-14

## 2021-02-13 RX ORDER — MORPHINE SULFATE 50 MG/1
2 CAPSULE, EXTENDED RELEASE ORAL ONCE
Refills: 0 | Status: DISCONTINUED | OUTPATIENT
Start: 2021-02-13 | End: 2021-02-13

## 2021-02-13 RX ORDER — METOPROLOL TARTRATE 50 MG
2.5 TABLET ORAL ONCE
Refills: 0 | Status: DISCONTINUED | OUTPATIENT
Start: 2021-02-13 | End: 2021-02-13

## 2021-02-13 RX ORDER — METOPROLOL TARTRATE 50 MG
25 TABLET ORAL EVERY 8 HOURS
Refills: 0 | Status: DISCONTINUED | OUTPATIENT
Start: 2021-02-13 | End: 2021-02-14

## 2021-02-13 RX ORDER — ACETAMINOPHEN 500 MG
325 TABLET ORAL ONCE
Refills: 0 | Status: COMPLETED | OUTPATIENT
Start: 2021-02-13 | End: 2021-02-13

## 2021-02-13 RX ORDER — NITROGLYCERIN 6.5 MG
0.4 CAPSULE, EXTENDED RELEASE ORAL
Refills: 0 | Status: DISCONTINUED | OUTPATIENT
Start: 2021-02-13 | End: 2021-02-17

## 2021-02-13 RX ORDER — INSULIN GLARGINE 100 [IU]/ML
20 INJECTION, SOLUTION SUBCUTANEOUS AT BEDTIME
Refills: 0 | Status: DISCONTINUED | OUTPATIENT
Start: 2021-02-13 | End: 2021-02-14

## 2021-02-13 RX ORDER — HEPARIN SODIUM 5000 [USP'U]/ML
1400 INJECTION INTRAVENOUS; SUBCUTANEOUS
Qty: 25000 | Refills: 0 | Status: DISCONTINUED | OUTPATIENT
Start: 2021-02-13 | End: 2021-02-14

## 2021-02-13 RX ADMIN — Medication 6 UNIT(S): at 08:02

## 2021-02-13 RX ADMIN — Medication 100 MICROGRAM(S): at 05:36

## 2021-02-13 RX ADMIN — Medication 8 UNIT(S): at 11:55

## 2021-02-13 RX ADMIN — Medication 0.4 MILLIGRAM(S): at 15:30

## 2021-02-13 RX ADMIN — Medication 25 MILLIGRAM(S): at 16:59

## 2021-02-13 RX ADMIN — HEPARIN SODIUM 1400 UNIT(S)/HR: 5000 INJECTION INTRAVENOUS; SUBCUTANEOUS at 13:38

## 2021-02-13 RX ADMIN — Medication 9 UNIT(S): at 17:01

## 2021-02-13 RX ADMIN — Medication 3: at 17:00

## 2021-02-13 RX ADMIN — Medication 325 MILLIGRAM(S): at 16:12

## 2021-02-13 RX ADMIN — Medication 81 MILLIGRAM(S): at 11:57

## 2021-02-13 RX ADMIN — Medication 25 MILLIGRAM(S): at 20:55

## 2021-02-13 RX ADMIN — Medication 4: at 11:55

## 2021-02-13 RX ADMIN — INSULIN GLARGINE 20 UNIT(S): 100 INJECTION, SOLUTION SUBCUTANEOUS at 22:11

## 2021-02-13 RX ADMIN — HEPARIN SODIUM 6000 UNIT(S): 5000 INJECTION INTRAVENOUS; SUBCUTANEOUS at 13:41

## 2021-02-13 RX ADMIN — HEPARIN SODIUM 1100 UNIT(S)/HR: 5000 INJECTION INTRAVENOUS; SUBCUTANEOUS at 06:43

## 2021-02-13 RX ADMIN — MORPHINE SULFATE 2 MILLIGRAM(S): 50 CAPSULE, EXTENDED RELEASE ORAL at 15:45

## 2021-02-13 RX ADMIN — Medication 0.4 MILLIGRAM(S): at 15:35

## 2021-02-13 RX ADMIN — DORZOLAMIDE HYDROCHLORIDE 1 DROP(S): 20 SOLUTION/ DROPS OPHTHALMIC at 11:00

## 2021-02-13 RX ADMIN — Medication 3: at 07:58

## 2021-02-13 RX ADMIN — HEPARIN SODIUM 3000 UNIT(S): 5000 INJECTION INTRAVENOUS; SUBCUTANEOUS at 06:47

## 2021-02-13 RX ADMIN — Medication 25 MILLIGRAM(S): at 05:36

## 2021-02-13 RX ADMIN — DORZOLAMIDE HYDROCHLORIDE 1 DROP(S): 20 SOLUTION/ DROPS OPHTHALMIC at 20:55

## 2021-02-13 RX ADMIN — LOSARTAN POTASSIUM 50 MILLIGRAM(S): 100 TABLET, FILM COATED ORAL at 05:36

## 2021-02-13 RX ADMIN — ATORVASTATIN CALCIUM 40 MILLIGRAM(S): 80 TABLET, FILM COATED ORAL at 20:55

## 2021-02-13 NOTE — CONSULT NOTE ADULT - PROBLEM SELECTOR RECOMMENDATION 2
Will get full thyroid panel, Will continue current Synthroid dose, will FU.  Suggest to repeat thyroid function test in 4-6 weeks. Outpatient follow up.
Continue on Lopressor 25 mg PO BID  Continue on Losartan 25 mg PO daily for BP management can transition to Norvasc prior to surgery to optimize renal function pre op

## 2021-02-13 NOTE — CONSULT NOTE ADULT - SUBJECTIVE AND OBJECTIVE BOX
HPI:  70 year old female (denies implanted devices) with significant PMHx of former 5 pack year cigarette smoker, T2DM (last HgA1c unknown, managed by endocrinologist in [place], denies complications), hypothyroidism, HTN, HLD admitted to [facility] on 2/8/2021 with NSTEMI and transferred to [facility] for LHC with Dr. Duggan. Upon admission to [facility], patient c/o chest pain that radiated to jaw and both arms that began 8 days ago. She had 4 episodes that began with mild exertion and progressively became worse and started with rest. Patient travels frequently back and forth from [place], most recent trip from [place] was 3 months ago. No troponin levels available in transfer documents. LHC revealed 80-90% stenosis to LAD, 80-90% stenosis to prox diag, 80-90% to prox Cx, 70-80 to mid/distal RCA and recommended for CTS eval for CABG.     2/11/2021 WBC 6.84, HGB 12.8, HCT 38.4, , , K 4.1, Cl 108, BUN 20, Cr 0.8, GFR >60        [place] [id number]   (11 Feb 2021 18:53)  Patient has history of diabetes, [place] speaking, called her daughter for history taking, on insulin at home, no recent hypoglycemic episodes, no polyuria polydipsia. Patient follows up with PCP for diabetes management.    PAST MEDICAL & SURGICAL HISTORY:  HLD (hyperlipidemia)    Hypothyroidism    DM (diabetes mellitus)    HTN (hypertension)    S/P VLADISLAV (total abdominal hysterectomy)        FAMILY HISTORY:  FH: heart disease (Father)        Social History:    Outpatient Medications:    MEDICATIONS  (STANDING):  aspirin enteric coated 81 milliGRAM(s) Oral daily  atorvastatin 40 milliGRAM(s) Oral at bedtime  dextrose 40% Gel 15 Gram(s) Oral once  dextrose 5%. 1000 milliLiter(s) (50 mL/Hr) IV Continuous <Continuous>  dextrose 5%. 1000 milliLiter(s) (100 mL/Hr) IV Continuous <Continuous>  dextrose 50% Injectable 25 Gram(s) IV Push once  dextrose 50% Injectable 12.5 Gram(s) IV Push once  dextrose 50% Injectable 25 Gram(s) IV Push once  dorzolamide 2% Ophthalmic Solution 1 Drop(s) Both EYES <User Schedule>  glucagon  Injectable 1 milliGRAM(s) IntraMuscular once  heparin  Infusion.  Unit(s)/Hr (13 mL/Hr) IV Continuous <Continuous>  insulin glargine Injectable (LANTUS) 20 Unit(s) SubCutaneous at bedtime  insulin lispro (ADMELOG) corrective regimen sliding scale   SubCutaneous three times a day before meals  insulin lispro (ADMELOG) corrective regimen sliding scale   SubCutaneous at bedtime  insulin lispro Injectable (ADMELOG) 9 Unit(s) SubCutaneous three times a day before meals  levothyroxine 100 MICROGram(s) Oral daily  metoprolol tartrate 25 milliGRAM(s) Oral two times a day    MEDICATIONS  (PRN):  cyclobenzaprine 5 milliGRAM(s) Oral at bedtime PRN Muscle Spasm  heparin   Injectable 6000 Unit(s) IV Push every 6 hours PRN For aPTT less than 40  heparin   Injectable 3000 Unit(s) IV Push every 6 hours PRN For aPTT between 40 - 57      Allergies    No Known Allergies    Intolerances      Review of Systems:  Constitutional: No fever, no chills  Eyes: No blurry vision  Neuro: No tremors  HEENT: No pain, no neck swelling  Cardiovascular: No chest pain, no palpitations  Respiratory: No SOB, no cough  GI: No nausea, vomiting, abdominal pain  : No dysuria  Skin: no rash  MSK: Has leg swelling.  Psych: no depression  Endocrine: no polyuria, polydipsia    UNABLE TO OBTAIN    ALL OTHER SYSTEMS REVIEWED AND NEGATIVE        PHYSICAL EXAM:  VITALS: T(C): 36.7 (02-13-21 @ 11:56)  T(F): 98 (02-13-21 @ 11:56), Max: 98.4 (02-12-21 @ 17:01)  HR: 70 (02-13-21 @ 11:56) (67 - 84)  BP: 122/78 (02-13-21 @ 11:56) (121/79 - 161/87)  RR:  (16 - 18)  SpO2:  (98% - 100%)  Wt(kg): --  GENERAL: NAD, well-groomed, well-developed  EYES: No proptosis, no lid lag  HEENT:  Atraumatic, Normocephalic  THYROID: Normal size, no palpable nodules  RESPIRATORY: Clear to auscultation bilaterally; No rales, rhonchi, wheezing  CARDIOVASCULAR: Si S2, No murmurs;  GI: Soft, non distended, normal bowel sounds  SKIN: Dry, intact, No rashes or lesions  MUSCULOSKELETAL: Has BL lower extremity edema.  NEURO:  no tremor, sensation decreased in feet BL,    POCT Blood Glucose.: 324 mg/dL (02-13-21 @ 11:12)  POCT Blood Glucose.: 261 mg/dL (02-13-21 @ 07:22)  POCT Blood Glucose.: 276 mg/dL (02-12-21 @ 21:44)  POCT Blood Glucose.: 203 mg/dL (02-12-21 @ 17:03)  POCT Blood Glucose.: 349 mg/dL (02-12-21 @ 11:01)  POCT Blood Glucose.: 271 mg/dL (02-12-21 @ 07:15)  POCT Blood Glucose.: 276 mg/dL (02-11-21 @ 21:25)  POCT Blood Glucose.: 154 mg/dL (02-11-21 @ 18:17)                            12.0   7.18  )-----------( 247      ( 13 Feb 2021 05:46 )             37.8       02-13    136  |  104  |  23  ----------------------------<  281<H>  4.0   |  20<L>  |  0.76    EGFR if : 93  EGFR if non : 81    Ca    9.6      02-13    TPro  7.2  /  Alb  3.6  /  TBili  0.2  /  DBili  x   /  AST  51<H>  /  ALT  50<H>  /  AlkPhos  73  02-13      Thyroid Function Tests:  02-12 @ 14:59 TSH 6.26 FreeT4 -- T3 98 Anti TPO -- Anti Thyroglobulin Ab -- TSI --              Radiology:

## 2021-02-13 NOTE — CONSULT NOTE ADULT - PROBLEM SELECTOR RECOMMENDATION 3
On medications,  no chest pain, stable, monitored and followed up by primary cardiothoracic team/cardiology team
Glycemic Control   Acuchecks 4 times a day before meals and at Bedtime cover with corrective Admelog sliding scale.   Continue on Lantus 8 units at HS and Ademelog 6 / 8 pre meal.   Continue with Carb steady / DASH diet

## 2021-02-13 NOTE — CONSULT NOTE ADULT - PROBLEM SELECTOR RECOMMENDATION 9
Will start Lantus 20 units at bed time.  Will start Admelog 8 units before each meal in addition to Admelog correction scale coverage.  Will decrease increase Lantus to 10 units at bed time.  Will decrease increase  Admelog to 10 units before each meal in addition to Admelog correction scale coverage.  Patient counseled for compliance with consistent low carb diet.

## 2021-02-13 NOTE — PROGRESS NOTE ADULT - ASSESSMENT
71 yo Female (denies implanted devices) with significant PMHx of former 5 pack year cigarette smoker, T2DM (last HgA1c unknown, managed by endocrinologist in [place], denies complications), hypothyroidism, HTN, HLD admitted to [facility] on 2/8/2021 with NSTEMI and transferred to [facility] 2/11/21 for LHC with Dr. Duggan.  LHC revealed 80-90% stenosis to LAD, 80-90% stenosis to prox diag, 80-90% to prox Cx, 70-80 to mid/distal RCA. CTS consult called to evaluate patient for cardiac surgery.      2/12 Pre op Cardiac Surgery in progress. D/C Brilinta.  Check P2Y12. Tentatively scheduled for Cardiac surgery with Dr. Adhikari on Wednesday 2nd case 2/17.    71 yo Female (denies implanted devices) with significant PMHx of former 5 pack year cigarette smoker, T2DM (last HgA1c unknown, managed by endocrinologist in [place], denies complications), hypothyroidism, HTN, HLD admitted to [facility] on 2/8/2021 with NSTEMI and transferred to [facility] 2/11/21 for LHC with Dr. Duggan.  LHC revealed 80-90% stenosis to LAD, 80-90% stenosis to prox diag, 80-90% to prox Cx, 70-80 to mid/distal RCA. CTS consult called to evaluate patient for cardiac surgery.    2/12 Pre op Cardiac Surgery in progress. D/C Brilinta.   P2Y12 94 . Tentatively scheduled for Cardiac surgery with Dr. Adhikari on Wednesday 2nd case 2/17 2/13 VSS; rsr 60-70; stable at this time echo neg; carotids neg; repeat P2y12 in am; endo consulted for uncontrolled dm2- A1C 9.2; pt placed on lantus and admelog   continue asa/ statin/ b-blockers; hep gttp; ARB d/c preop   OR 2/17 with Dr. Adhikari

## 2021-02-13 NOTE — PROGRESS NOTE ADULT - SUBJECTIVE AND OBJECTIVE BOX
VITAL SIGNS    Telemetry:    Vital Signs Last 24 Hrs  T(C): 36.3 (02-13-21 @ 05:29), Max: 36.9 (02-12-21 @ 17:01)  T(F): 97.3 (02-13-21 @ 05:29), Max: 98.4 (02-12-21 @ 17:01)  HR: 84 (02-13-21 @ 05:29) (67 - 84)  BP: 121/79 (02-13-21 @ 05:29) (121/79 - 161/87)  RR: 18 (02-13-21 @ 05:29) (16 - 18)  SpO2: 100% (02-13-21 @ 05:29) (98% - 100%)            02-12 @ 07:01  -  02-13 @ 07:00  --------------------------------------------------------  IN: 577 mL / OUT: 670 mL / NET: -93 mL    02-13 @ 07:01  -  02-13 @ 11:15  --------------------------------------------------------  IN: 240 mL / OUT: 0 mL / NET: 240 mL       Daily     Daily   Admit Wt: Drug Dosing Weight  Height (cm): 151 (11 Feb 2021 18:55)  Weight (kg): 74.843 (11 Feb 2021 18:55)  BMI (kg/m2): 32.8 (11 Feb 2021 18:55)  BSA (m2): 1.71 (11 Feb 2021 18:55)    Bilirubin Total, Serum: 0.2 mg/dL (02-13 @ 05:46)    CAPILLARY BLOOD GLUCOSE      POCT Blood Glucose.: 324 mg/dL (13 Feb 2021 11:12)  POCT Blood Glucose.: 261 mg/dL (13 Feb 2021 07:22)  POCT Blood Glucose.: 276 mg/dL (12 Feb 2021 21:44)  POCT Blood Glucose.: 203 mg/dL (12 Feb 2021 17:03)          aspirin enteric coated 81 milliGRAM(s) Oral daily  atorvastatin 40 milliGRAM(s) Oral at bedtime  cyclobenzaprine 5 milliGRAM(s) Oral at bedtime PRN  dextrose 40% Gel 15 Gram(s) Oral once  dextrose 5%. 1000 milliLiter(s) IV Continuous <Continuous>  dextrose 5%. 1000 milliLiter(s) IV Continuous <Continuous>  dextrose 50% Injectable 25 Gram(s) IV Push once  dextrose 50% Injectable 12.5 Gram(s) IV Push once  dextrose 50% Injectable 25 Gram(s) IV Push once  dorzolamide 2% Ophthalmic Solution 1 Drop(s) Both EYES <User Schedule>  glucagon  Injectable 1 milliGRAM(s) IntraMuscular once  heparin   Injectable 6000 Unit(s) IV Push every 6 hours PRN  heparin   Injectable 3000 Unit(s) IV Push every 6 hours PRN  heparin  Infusion.  Unit(s)/Hr IV Continuous <Continuous>  insulin glargine Injectable (LANTUS) 8 Unit(s) SubCutaneous at bedtime  insulin lispro (ADMELOG) corrective regimen sliding scale   SubCutaneous three times a day before meals  insulin lispro (ADMELOG) corrective regimen sliding scale   SubCutaneous at bedtime  insulin lispro Injectable (ADMELOG) 6 Unit(s) SubCutaneous before breakfast  insulin lispro Injectable (ADMELOG) 8 Unit(s) SubCutaneous before lunch  levothyroxine 100 MICROGram(s) Oral daily  losartan 50 milliGRAM(s) Oral daily  metoprolol tartrate 25 milliGRAM(s) Oral two times a day      PHYSICAL EXAM    Subjective: "Hi.   Neurology: alert and oriented x 3, nonfocal, no gross deficits  CV : tele:  RSR  Sternal Wound :  CDI with dressing , Stable  Lungs: clear. RR easy, unlabored   Abdomen: soft, nontender, nondistended, positive bowel sounds, bowel movement   Neg N/V/D   :  pt voiding without difficulty   Extremities:   CALVILLO; edema, neg calf tenderness.   PPP bilaterally      PW:  Chest tubes:                 VITAL SIGNS    Telemetry:  rsr 60-70   Vital Signs Last 24 Hrs  T(C): 36.3 (02-13-21 @ 05:29), Max: 36.9 (02-12-21 @ 17:01)  T(F): 97.3 (02-13-21 @ 05:29), Max: 98.4 (02-12-21 @ 17:01)  HR: 84 (02-13-21 @ 05:29) (67 - 84)  BP: 121/79 (02-13-21 @ 05:29) (121/79 - 161/87)  RR: 18 (02-13-21 @ 05:29) (16 - 18)  SpO2: 100% (02-13-21 @ 05:29) (98% - 100%)            02-12 @ 07:01  -  02-13 @ 07:00  --------------------------------------------------------  IN: 577 mL / OUT: 670 mL / NET: -93 mL    02-13 @ 07:01  -  02-13 @ 11:15  --------------------------------------------------------  IN: 240 mL / OUT: 0 mL / NET: 240 mL       Daily     Daily   Admit Wt: Drug Dosing Weight  Height (cm): 151 (11 Feb 2021 18:55)  Weight (kg): 74.843 (11 Feb 2021 18:55)  BMI (kg/m2): 32.8 (11 Feb 2021 18:55)  BSA (m2): 1.71 (11 Feb 2021 18:55)    Bilirubin Total, Serum: 0.2 mg/dL (02-13 @ 05:46)    CAPILLARY BLOOD GLUCOSE      POCT Blood Glucose.: 324 mg/dL (13 Feb 2021 11:12)  POCT Blood Glucose.: 261 mg/dL (13 Feb 2021 07:22)  POCT Blood Glucose.: 276 mg/dL (12 Feb 2021 21:44)  POCT Blood Glucose.: 203 mg/dL (12 Feb 2021 17:03)          aspirin enteric coated 81 milliGRAM(s) Oral daily  atorvastatin 40 milliGRAM(s) Oral at bedtime  cyclobenzaprine 5 milliGRAM(s) Oral at bedtime PRN  dextrose 40% Gel 15 Gram(s) Oral once  dextrose 5%. 1000 milliLiter(s) IV Continuous <Continuous>  dextrose 5%. 1000 milliLiter(s) IV Continuous <Continuous>  dextrose 50% Injectable 25 Gram(s) IV Push once  dextrose 50% Injectable 12.5 Gram(s) IV Push once  dextrose 50% Injectable 25 Gram(s) IV Push once  dorzolamide 2% Ophthalmic Solution 1 Drop(s) Both EYES <User Schedule>  glucagon  Injectable 1 milliGRAM(s) IntraMuscular once  heparin   Injectable 6000 Unit(s) IV Push every 6 hours PRN  heparin   Injectable 3000 Unit(s) IV Push every 6 hours PRN  heparin  Infusion.  Unit(s)/Hr IV Continuous <Continuous>  insulin glargine Injectable (LANTUS) 8 Unit(s) SubCutaneous at bedtime  insulin lispro (ADMELOG) corrective regimen sliding scale   SubCutaneous three times a day before meals  insulin lispro (ADMELOG) corrective regimen sliding scale   SubCutaneous at bedtime  insulin lispro Injectable (ADMELOG) 6 Unit(s) SubCutaneous before breakfast  insulin lispro Injectable (ADMELOG) 8 Unit(s) SubCutaneous before lunch  levothyroxine 100 MICROGram(s) Oral daily  losartan 50 milliGRAM(s) Oral daily  metoprolol tartrate 25 milliGRAM(s) Oral two times a day      PHYSICAL EXAM    Subjective: "Ilia."   Neurology: alert and oriented x 3, nonfocal, no gross deficits; [place]i speaking only   CV : tele:  RSR 60-80  Lungs: clear. RR easy, unlabored   Abdomen: soft, nontender, nondistended, positive bowel sounds, + bowel movement   Neg N/V/D; obese abdomen   :  pt voiding without difficulty   Extremities:   CALVILLO; neg LE edema, neg calf tenderness.   PPP bilaterally; cath site cdi neg hematoma/ bleeding

## 2021-02-13 NOTE — PROGRESS NOTE ADULT - PROBLEM SELECTOR PLAN 1
continue preop workup   continue asa statin b-blockers and hep gttp  arb d/c   echo/ carotids done  repeat p2y12 in am   shower qd  OR 2/17 with Dr. ball

## 2021-02-13 NOTE — CHART NOTE - NSCHARTNOTEFT_GEN_A_CORE
Called to bedside to evaluate pt c/o of jaw pain 5/10. neg CP/ SOB/ dizziness    pt given 2 nitro SL/ O2 NC applied and morphine 2 mg; tylenol given for HA    jaw pain relieved in 5 min    ekg wnl neg changes noted; cardiac enzymes neg    continue to monitor Called to bedside to evaluate pt c/o of jaw pain 5/10. neg CP/ SOB/ dizziness    pt given 2 nitro SL/ O2 NC applied and morphine 2 mg; tylenol given for HA    jaw pain relieved in 5 min    ekg wnl neg changes noted; cardiac enzymes neg    continue to monitor; d/w Dr. Cramer

## 2021-02-13 NOTE — PROGRESS NOTE ADULT - PROBLEM SELECTOR PLAN 3
endo consulted for uncontrolled dm2- A1C 9.2;   pt placed on lantus and admelog  accuchecks ac and hs  diabetic diet

## 2021-02-13 NOTE — CONSULT NOTE ADULT - ASSESSMENT
Assessment  DMT2: 70y Female with DM T2 with hyperglycemia admitted with chest pain patient was on insulin at home, blood sugars running high, no hypoglycemic episode,  eating meals,  non compliant with low carb diet.  Patient is high risk with high level decision making due to uncontrolled diabetes, has increased risk for complications.  CAD: Planing surgery on Wed, on medications, monitored, stable.  Hypothyroidism: No new thyroid function test, on Synthroid 100 mcg po daily, compliant with Synthroid intake, asymptomatic.    Overweight: No strict exercise routines, neither on low calorie diet.                Flor Torres MD  Cell: [phone]  Office: [phone]

## 2021-02-14 LAB
ANION GAP SERPL CALC-SCNC: 12 MMOL/L — SIGNIFICANT CHANGE UP (ref 5–17)
APTT BLD: 167.7 SEC — CRITICAL HIGH (ref 27.5–35.5)
APTT BLD: >200 SEC — CRITICAL HIGH (ref 27.5–35.5)
APTT BLD: >200 SEC — CRITICAL HIGH (ref 27.5–35.5)
BUN SERPL-MCNC: 20 MG/DL — SIGNIFICANT CHANGE UP (ref 7–23)
CALCIUM SERPL-MCNC: 9.6 MG/DL — SIGNIFICANT CHANGE UP (ref 8.4–10.5)
CHLORIDE SERPL-SCNC: 104 MMOL/L — SIGNIFICANT CHANGE UP (ref 96–108)
CO2 SERPL-SCNC: 22 MMOL/L — SIGNIFICANT CHANGE UP (ref 22–31)
CREAT SERPL-MCNC: 0.75 MG/DL — SIGNIFICANT CHANGE UP (ref 0.5–1.3)
GLUCOSE BLDC GLUCOMTR-MCNC: 145 MG/DL — HIGH (ref 70–99)
GLUCOSE BLDC GLUCOMTR-MCNC: 227 MG/DL — HIGH (ref 70–99)
GLUCOSE BLDC GLUCOMTR-MCNC: 271 MG/DL — HIGH (ref 70–99)
GLUCOSE BLDC GLUCOMTR-MCNC: 88 MG/DL — SIGNIFICANT CHANGE UP (ref 70–99)
GLUCOSE SERPL-MCNC: 200 MG/DL — HIGH (ref 70–99)
HCT VFR BLD CALC: 38.4 % — SIGNIFICANT CHANGE UP (ref 34.5–45)
HGB BLD-MCNC: 12 G/DL — SIGNIFICANT CHANGE UP (ref 11.5–15.5)
INR BLD: 1.18 RATIO — HIGH (ref 0.88–1.16)
MCHC RBC-ENTMCNC: 28.9 PG — SIGNIFICANT CHANGE UP (ref 27–34)
MCHC RBC-ENTMCNC: 31.3 GM/DL — LOW (ref 32–36)
MCV RBC AUTO: 92.5 FL — SIGNIFICANT CHANGE UP (ref 80–100)
NRBC # BLD: 0 /100 WBCS — SIGNIFICANT CHANGE UP (ref 0–0)
PA ADP PRP-ACNC: 238 PRU — SIGNIFICANT CHANGE UP (ref 194–417)
PLATELET # BLD AUTO: 242 K/UL — SIGNIFICANT CHANGE UP (ref 150–400)
POTASSIUM SERPL-MCNC: 4 MMOL/L — SIGNIFICANT CHANGE UP (ref 3.5–5.3)
POTASSIUM SERPL-SCNC: 4 MMOL/L — SIGNIFICANT CHANGE UP (ref 3.5–5.3)
PROTHROM AB SERPL-ACNC: 14.1 SEC — HIGH (ref 10.6–13.6)
RBC # BLD: 4.15 M/UL — SIGNIFICANT CHANGE UP (ref 3.8–5.2)
RBC # FLD: 12.7 % — SIGNIFICANT CHANGE UP (ref 10.3–14.5)
SODIUM SERPL-SCNC: 138 MMOL/L — SIGNIFICANT CHANGE UP (ref 135–145)
WBC # BLD: 7.5 K/UL — SIGNIFICANT CHANGE UP (ref 3.8–10.5)
WBC # FLD AUTO: 7.5 K/UL — SIGNIFICANT CHANGE UP (ref 3.8–10.5)

## 2021-02-14 PROCEDURE — 99232 SBSQ HOSP IP/OBS MODERATE 35: CPT

## 2021-02-14 RX ORDER — METOPROLOL TARTRATE 50 MG
25 TABLET ORAL ONCE
Refills: 0 | Status: COMPLETED | OUTPATIENT
Start: 2021-02-14 | End: 2021-02-14

## 2021-02-14 RX ORDER — HEPARIN SODIUM 5000 [USP'U]/ML
1050 INJECTION INTRAVENOUS; SUBCUTANEOUS
Qty: 25000 | Refills: 0 | Status: DISCONTINUED | OUTPATIENT
Start: 2021-02-14 | End: 2021-02-17

## 2021-02-14 RX ORDER — INSULIN GLARGINE 100 [IU]/ML
28 INJECTION, SOLUTION SUBCUTANEOUS AT BEDTIME
Refills: 0 | Status: DISCONTINUED | OUTPATIENT
Start: 2021-02-14 | End: 2021-02-15

## 2021-02-14 RX ORDER — METOPROLOL TARTRATE 50 MG
50 TABLET ORAL
Refills: 0 | Status: DISCONTINUED | OUTPATIENT
Start: 2021-02-14 | End: 2021-02-17

## 2021-02-14 RX ORDER — INSULIN LISPRO 100/ML
11 VIAL (ML) SUBCUTANEOUS
Refills: 0 | Status: DISCONTINUED | OUTPATIENT
Start: 2021-02-14 | End: 2021-02-15

## 2021-02-14 RX ADMIN — Medication 100 MICROGRAM(S): at 05:42

## 2021-02-14 RX ADMIN — HEPARIN SODIUM 9 UNIT(S)/HR: 5000 INJECTION INTRAVENOUS; SUBCUTANEOUS at 17:15

## 2021-02-14 RX ADMIN — HEPARIN SODIUM 10.5 UNIT(S)/HR: 5000 INJECTION INTRAVENOUS; SUBCUTANEOUS at 07:56

## 2021-02-14 RX ADMIN — Medication 11 UNIT(S): at 17:15

## 2021-02-14 RX ADMIN — Medication 9 UNIT(S): at 11:30

## 2021-02-14 RX ADMIN — Medication 3: at 11:29

## 2021-02-14 RX ADMIN — DORZOLAMIDE HYDROCHLORIDE 1 DROP(S): 20 SOLUTION/ DROPS OPHTHALMIC at 21:17

## 2021-02-14 RX ADMIN — Medication 25 MILLIGRAM(S): at 05:42

## 2021-02-14 RX ADMIN — Medication 2: at 07:55

## 2021-02-14 RX ADMIN — Medication 50 MILLIGRAM(S): at 17:15

## 2021-02-14 RX ADMIN — Medication 25 MILLIGRAM(S): at 07:55

## 2021-02-14 RX ADMIN — Medication 81 MILLIGRAM(S): at 11:29

## 2021-02-14 RX ADMIN — INSULIN GLARGINE 28 UNIT(S): 100 INJECTION, SOLUTION SUBCUTANEOUS at 21:52

## 2021-02-14 RX ADMIN — Medication 9 UNIT(S): at 07:55

## 2021-02-14 RX ADMIN — DORZOLAMIDE HYDROCHLORIDE 1 DROP(S): 20 SOLUTION/ DROPS OPHTHALMIC at 07:57

## 2021-02-14 RX ADMIN — ATORVASTATIN CALCIUM 40 MILLIGRAM(S): 80 TABLET, FILM COATED ORAL at 21:17

## 2021-02-14 NOTE — PROGRESS NOTE ADULT - PROBLEM SELECTOR PLAN 1
Will increase Lantus to 28u at bedtime.  Will increase Admelog to 11u before each meal and continue Admelog correction scale coverage. Will continue monitoring FS and FU.  Patient counseled for compliance with consistent low carb diet and exercise as tolerated outpatient.

## 2021-02-14 NOTE — PROGRESS NOTE ADULT - SUBJECTIVE AND OBJECTIVE BOX
VITAL SIGNS-Telemetry:  SR 88  Vital Signs Last 24 Hrs  T(C): 36.4 (02-14-21 @ 04:49), Max: 36.7 (02-13-21 @ 11:56)  T(F): 97.5 (02-14-21 @ 04:49), Max: 98.1 (02-13-21 @ 20:24)  HR: 64 (02-14-21 @ 04:49) (64 - 84)  BP: 120/76 (02-14-21 @ 04:49) (111/73 - 138/81)  RR: 18 (02-14-21 @ 04:49) (18 - 18)  SpO2: 99% (02-14-21 @ 04:49) (99% - 100%)         02-13 @ 07:01  -  02-14 @ 07:00  --------------------------------------------------------  IN: 740 mL / OUT: 600 mL / NET: 140 mL    Daily     Daily     CAPILLARY BLOOD GLUCOSE  POCT Blood Glucose.: 158 mg/dL (13 Feb 2021 21:44)  POCT Blood Glucose.: 251 mg/dL (13 Feb 2021 16:44)  POCT Blood Glucose.: 324 mg/dL (13 Feb 2021 11:12)  POCT Blood Glucose.: 261 mg/dL (13 Feb 2021 07:22)      PHYSICAL EXAM:  Neurology: alert and oriented x 3, nonfocal, no gross deficits  CV : S1S2  Lungs: CTA  Abdomen: soft, nontender, nondistended, positive bowel sounds, last bowel movement         Extremities:     no C/C/E +pp +2 b/l    aspirin enteric coated 81 milliGRAM(s) Oral daily  atorvastatin 40 milliGRAM(s) Oral at bedtime  cyclobenzaprine 5 milliGRAM(s) Oral at bedtime PRN  dextrose 40% Gel 15 Gram(s) Oral once  dextrose 50% Injectable 25 Gram(s) IV Push once  dorzolamide 2% Ophthalmic Solution 1 Drop(s) Both EYES <User Schedule>  heparin  Infusion 1050 Unit(s)/Hr IV Continuous <Continuous>  insulin glargine Injectable (LANTUS) 20 Unit(s) SubCutaneous at bedtime  insulin lispro (ADMELOG) corrective regimen sliding scale   SubCutaneous three times a day before meals  insulin lispro (ADMELOG) corrective regimen sliding scale   SubCutaneous at bedtime  insulin lispro Injectable (ADMELOG) 9 Unit(s) SubCutaneous three times a day before meals  levothyroxine 100 MICROGram(s) Oral daily  metoprolol tartrate 25 milliGRAM(s) Oral every 8 hours  nitroglycerin     SubLingual 0.4 milliGRAM(s) SubLingual every 5 minutes PRN      Physical Therapy Rec:   Home  [  x]   Home w/ PT  [  ]  Rehab  [  ]  Discussed with Cardiothoracic Team at AM rounds.

## 2021-02-14 NOTE — PROGRESS NOTE ADULT - ASSESSMENT
Assessment  DMT2: 70y Female with DM T2 with hyperglycemia admitted with chest pain, A1C 9.2%, was on insulin at home, started on basal bolus insulin, increased dose yesterday, blood sugars still running high and not at target, no hypoglycemic episodes. Patient is eating meals, appears comfortable, planning OR 2/17.  CAD: Planing surgery on Wed, on medications, monitored, stable.  Hypothyroidism: on Synthroid 100 mcg po daily, compliant with Synthroid intake, TSH elevated.  Overweight: No strict exercise routines, neither on low calorie diet.        Flor Torres MD  Cell: [phone]  Office: [phone]               Assessment  DMT2: 70y Female with DM T2 with hyperglycemia admitted with chest pain, A1C 9.2%, was  on insulin at home, started on basal bolus insulin, increased dose yesterday, blood sugars still running high and not at target, no hypoglycemic episodes. Patient is eating meals, appears comfortable, planning OR 2/17.  CAD: Planing surgery on Wed, on medications, monitored, stable.  Hypothyroidism: on Synthroid 100 mcg po daily, compliant with Synthroid intake, TSH elevated.  Overweight: No strict exercise routines, neither on low calorie diet.        Flor Torres MD  Cell: [phone]  Office: [phone]

## 2021-02-14 NOTE — PROGRESS NOTE ADULT - SUBJECTIVE AND OBJECTIVE BOX
Chief complaint  Patient is a 70y old  Female who presents with a chief complaint of cp (14 Feb 2021 07:20)   Review of systems  Patient in bed, looks comfortable, no hypoglycemic episodes.    Labs and Fingersticks  CAPILLARY BLOOD GLUCOSE      POCT Blood Glucose.: 271 mg/dL (14 Feb 2021 11:13)  POCT Blood Glucose.: 227 mg/dL (14 Feb 2021 07:41)  POCT Blood Glucose.: 158 mg/dL (13 Feb 2021 21:44)  POCT Blood Glucose.: 251 mg/dL (13 Feb 2021 16:44)      Anion Gap, Serum: 12 (02-14 @ 06:10)  Anion Gap, Serum: 12 (02-13 @ 05:46)      Calcium, Total Serum: 9.6 (02-14 @ 06:10)  Calcium, Total Serum: 9.6 (02-13 @ 05:46)  Albumin, Serum: 3.6 (02-13 @ 05:46)    Alanine Aminotransferase (ALT/SGPT): 50 <H> (02-13 @ 05:46)  Alkaline Phosphatase, Serum: 73 (02-13 @ 05:46)  Aspartate Aminotransferase (AST/SGOT): 51 <H> (02-13 @ 05:46)        02-14    138  |  104  |  20  ----------------------------<  200<H>  4.0   |  22  |  0.75    Ca    9.6      14 Feb 2021 06:10    TPro  7.2  /  Alb  3.6  /  TBili  0.2  /  DBili  x   /  AST  51<H>  /  ALT  50<H>  /  AlkPhos  73  02-13                        12.0   7.50  )-----------( 242      ( 14 Feb 2021 06:09 )             38.4     Medications  MEDICATIONS  (STANDING):  aspirin enteric coated 81 milliGRAM(s) Oral daily  atorvastatin 40 milliGRAM(s) Oral at bedtime  dextrose 40% Gel 15 Gram(s) Oral once  dextrose 50% Injectable 25 Gram(s) IV Push once  dorzolamide 2% Ophthalmic Solution 1 Drop(s) Both EYES <User Schedule>  heparin  Infusion 1050 Unit(s)/Hr (10.5 mL/Hr) IV Continuous <Continuous>  insulin glargine Injectable (LANTUS) 28 Unit(s) SubCutaneous at bedtime  insulin lispro (ADMELOG) corrective regimen sliding scale   SubCutaneous three times a day before meals  insulin lispro (ADMELOG) corrective regimen sliding scale   SubCutaneous at bedtime  insulin lispro Injectable (ADMELOG) 11 Unit(s) SubCutaneous three times a day before meals  levothyroxine 100 MICROGram(s) Oral daily  metoprolol tartrate 50 milliGRAM(s) Oral two times a day      Physical Exam  General: Patient comfortable in bed  Vital Signs Last 12 Hrs  T(F): 97.6 (02-14-21 @ 12:10), Max: 97.6 (02-14-21 @ 12:10)  HR: 59 (02-14-21 @ 12:10) (59 - 67)  BP: 127/80 (02-14-21 @ 12:10) (120/76 - 127/80)  BP(mean): 91 (02-14-21 @ 04:49) (91 - 91)  RR: 18 (02-14-21 @ 12:10) (18 - 18)  SpO2: 95% (02-14-21 @ 12:10) (95% - 99%)  Neck: No palpable thyroid nodules.  CVS: S1S2, No murmurs  Respiratory: No wheezing, no crepitations  GI: Abdomen soft, bowel sounds positive  Musculoskeletal:  edema lower extremities.   Skin: No skin rashes, no ecchymosis    Diagnostics             Chief complaint  Patient is a 70y old  Female who presents with a chief complaint of cp (14 Feb 2021 07:20)   Review of systems  Patient in bed, looks comfortable, no hypoglycemic episodes.    Labs and Fingersticks  CAPILLARY BLOOD GLUCOSE      POCT Blood Glucose.: 271 mg/dL (14 Feb 2021 11:13)  POCT Blood Glucose.: 227 mg/dL (14 Feb 2021 07:41)  POCT Blood Glucose.: 158 mg/dL (13 Feb 2021 21:44)  POCT Blood Glucose.: 251 mg/dL (13 Feb 2021 16:44)      Anion Gap, Serum: 12 (02-14 @ 06:10)  Anion Gap, Serum: 12 (02-13 @ 05:46)      Calcium, Total Serum: 9.6 (02-14 @ 06:10)  Calcium, Total Serum: 9.6 (02-13 @ 05:46)  Albumin, Serum: 3.6 (02-13 @ 05:46)    Alanine Aminotransferase (ALT/SGPT): 50 <H> (02-13 @ 05:46)  Alkaline Phosphatase, Serum: 73 (02-13 @ 05:46)  Aspartate Aminotransferase (AST/SGOT): 51 <H> (02-13 @ 05:46)        02-14    138  |  104  |  20  ----------------------------<  200<H>  4.0   |  22  |  0.75    Ca    9.6      14 Feb 2021 06:10    TPro  7.2  /  Alb  3.6  /  TBili  0.2  /  DBili  x   /  AST  51<H>  /  ALT  50<H>  /  AlkPhos  73  02-13                        12.0   7.50  )-----------( 242      ( 14 Feb 2021 06:09 )             38.4     Medications  MEDICATIONS  (STANDING):  aspirin enteric coated 81 milliGRAM(s) Oral daily  atorvastatin 40 milliGRAM(s) Oral at bedtime  dextrose 40% Gel 15 Gram(s) Oral once  dextrose 50% Injectable 25 Gram(s) IV Push once  dorzolamide 2% Ophthalmic Solution 1 Drop(s) Both EYES <User Schedule>  heparin  Infusion 1050 Unit(s)/Hr (10.5 mL/Hr) IV Continuous <Continuous>  insulin glargine Injectable (LANTUS) 28 Unit(s) SubCutaneous at bedtime  insulin lispro (ADMELOG) corrective regimen sliding scale   SubCutaneous three times a day before meals  insulin lispro (ADMELOG) corrective regimen sliding scale   SubCutaneous at bedtime  insulin lispro Injectable (ADMELOG) 11 Unit(s) SubCutaneous three times a day before meals  levothyroxine 100 MICROGram(s) Oral daily  metoprolol tartrate 50 milliGRAM(s) Oral two times a day      Physical Exam  General: Patient comfortable in bed  Vital Signs Last 12 Hrs  T(F): 97.6 (02-14-21 @ 12:10), Max: 97.6 (02-14-21 @ 12:10)  HR: 59 (02-14-21 @ 12:10) (59 - 67)  BP: 127/80 (02-14-21 @ 12:10) (120/76 - 127/80)  BP(mean): 91 (02-14-21 @ 04:49) (91 - 91)  RR: 18 (02-14-21 @ 12:10) (18 - 18)  SpO2: 95% (02-14-21 @ 12:10) (95% - 99%)  Neck: No palpable thyroid nodules.  CVS: S1S2, No murmurs  Respiratory: No wheezing, no crepitations  GI: Abdomen soft, bowel sounds positive  Musculoskeletal:  edema lower extremities.   Skin: No skin rashes, no ecchymosis    Diagnostics

## 2021-02-15 LAB
ANION GAP SERPL CALC-SCNC: 12 MMOL/L — SIGNIFICANT CHANGE UP (ref 5–17)
APTT BLD: 124.3 SEC — CRITICAL HIGH (ref 27.5–35.5)
APTT BLD: 49.3 SEC — HIGH (ref 27.5–35.5)
APTT BLD: 56.1 SEC — HIGH (ref 27.5–35.5)
BUN SERPL-MCNC: 25 MG/DL — HIGH (ref 7–23)
CALCIUM SERPL-MCNC: 9.4 MG/DL — SIGNIFICANT CHANGE UP (ref 8.4–10.5)
CHLORIDE SERPL-SCNC: 105 MMOL/L — SIGNIFICANT CHANGE UP (ref 96–108)
CO2 SERPL-SCNC: 20 MMOL/L — LOW (ref 22–31)
CREAT SERPL-MCNC: 0.71 MG/DL — SIGNIFICANT CHANGE UP (ref 0.5–1.3)
GLUCOSE BLDC GLUCOMTR-MCNC: 184 MG/DL — HIGH (ref 70–99)
GLUCOSE BLDC GLUCOMTR-MCNC: 261 MG/DL — HIGH (ref 70–99)
GLUCOSE BLDC GLUCOMTR-MCNC: 72 MG/DL — SIGNIFICANT CHANGE UP (ref 70–99)
GLUCOSE BLDC GLUCOMTR-MCNC: 89 MG/DL — SIGNIFICANT CHANGE UP (ref 70–99)
GLUCOSE SERPL-MCNC: 189 MG/DL — HIGH (ref 70–99)
HCT VFR BLD CALC: 36.9 % — SIGNIFICANT CHANGE UP (ref 34.5–45)
HGB BLD-MCNC: 11.8 G/DL — SIGNIFICANT CHANGE UP (ref 11.5–15.5)
MCHC RBC-ENTMCNC: 29.4 PG — SIGNIFICANT CHANGE UP (ref 27–34)
MCHC RBC-ENTMCNC: 32 GM/DL — SIGNIFICANT CHANGE UP (ref 32–36)
MCV RBC AUTO: 92 FL — SIGNIFICANT CHANGE UP (ref 80–100)
NRBC # BLD: 0 /100 WBCS — SIGNIFICANT CHANGE UP (ref 0–0)
PLATELET # BLD AUTO: 251 K/UL — SIGNIFICANT CHANGE UP (ref 150–400)
POTASSIUM SERPL-MCNC: 4 MMOL/L — SIGNIFICANT CHANGE UP (ref 3.5–5.3)
POTASSIUM SERPL-SCNC: 4 MMOL/L — SIGNIFICANT CHANGE UP (ref 3.5–5.3)
RBC # BLD: 4.01 M/UL — SIGNIFICANT CHANGE UP (ref 3.8–5.2)
RBC # FLD: 12.8 % — SIGNIFICANT CHANGE UP (ref 10.3–14.5)
SARS-COV-2 RNA SPEC QL NAA+PROBE: SIGNIFICANT CHANGE UP
SODIUM SERPL-SCNC: 137 MMOL/L — SIGNIFICANT CHANGE UP (ref 135–145)
T4 FREE SERPL-MCNC: 1.4 NG/DL — SIGNIFICANT CHANGE UP (ref 0.9–1.8)
WBC # BLD: 9.33 K/UL — SIGNIFICANT CHANGE UP (ref 3.8–10.5)
WBC # FLD AUTO: 9.33 K/UL — SIGNIFICANT CHANGE UP (ref 3.8–10.5)

## 2021-02-15 PROCEDURE — 99232 SBSQ HOSP IP/OBS MODERATE 35: CPT

## 2021-02-15 RX ORDER — INSULIN LISPRO 100/ML
12 VIAL (ML) SUBCUTANEOUS
Refills: 0 | Status: DISCONTINUED | OUTPATIENT
Start: 2021-02-15 | End: 2021-02-17

## 2021-02-15 RX ORDER — INSULIN GLARGINE 100 [IU]/ML
32 INJECTION, SOLUTION SUBCUTANEOUS AT BEDTIME
Refills: 0 | Status: DISCONTINUED | OUTPATIENT
Start: 2021-02-15 | End: 2021-02-17

## 2021-02-15 RX ADMIN — DORZOLAMIDE HYDROCHLORIDE 1 DROP(S): 20 SOLUTION/ DROPS OPHTHALMIC at 08:13

## 2021-02-15 RX ADMIN — Medication 100 MICROGRAM(S): at 06:10

## 2021-02-15 RX ADMIN — HEPARIN SODIUM 5.5 UNIT(S)/HR: 5000 INJECTION INTRAVENOUS; SUBCUTANEOUS at 11:43

## 2021-02-15 RX ADMIN — Medication 12 UNIT(S): at 11:44

## 2021-02-15 RX ADMIN — Medication 81 MILLIGRAM(S): at 11:44

## 2021-02-15 RX ADMIN — Medication 11 UNIT(S): at 08:11

## 2021-02-15 RX ADMIN — Medication 50 MILLIGRAM(S): at 17:50

## 2021-02-15 RX ADMIN — INSULIN GLARGINE 32 UNIT(S): 100 INJECTION, SOLUTION SUBCUTANEOUS at 22:00

## 2021-02-15 RX ADMIN — Medication 50 MILLIGRAM(S): at 06:10

## 2021-02-15 RX ADMIN — HEPARIN SODIUM 6 UNIT(S)/HR: 5000 INJECTION INTRAVENOUS; SUBCUTANEOUS at 20:00

## 2021-02-15 RX ADMIN — DORZOLAMIDE HYDROCHLORIDE 1 DROP(S): 20 SOLUTION/ DROPS OPHTHALMIC at 22:02

## 2021-02-15 RX ADMIN — Medication 12 UNIT(S): at 17:47

## 2021-02-15 RX ADMIN — Medication 1: at 08:11

## 2021-02-15 RX ADMIN — Medication 3: at 11:43

## 2021-02-15 RX ADMIN — ATORVASTATIN CALCIUM 40 MILLIGRAM(S): 80 TABLET, FILM COATED ORAL at 22:00

## 2021-02-15 NOTE — PROGRESS NOTE ADULT - ASSESSMENT
Assessment  DMT2: 70y Female with DM T2 with hyperglycemia admitted with chest pain, A1C 9.2%, was on insulin at home, now on basal bolus insulin, increased dose yesterday, blood sugars running high and not at preop target with intermittent elevations, no hypoglycemic episodes. Patient is eating meals, appears comfortable, planning OR 2/17, requires tight glycemic control preoperatively.  CAD: Planing surgery on Wed, on medications, monitored, stable.  Hypothyroidism: on Synthroid 100 mcg po daily, compliant with Synthroid intake, subclinical hypothyroid, FT4 1.4.  Overweight: No strict exercise routines, neither on low calorie diet.        Flor Torres MD  Cell: [phone]  Office: [phone]               Assessment  DMT2: 70y Female with DM T2 with hyperglycemia admitted with chest pain, A1C 9.2%, was on insulin at home, now  on basal bolus insulin, increased dose yesterday, blood sugars running high and not at preop target with intermittent elevations, no hypoglycemic episodes. Patient is eating meals, appears comfortable, planning OR 2/17, requires tight glycemic control preoperatively.  CAD: Planing surgery on Wed, on medications, monitored, stable.  Hypothyroidism: on Synthroid 100 mcg po daily, compliant with Synthroid intake, subclinical hypothyroid, FT4 1.4.  Overweight: No strict exercise routines, neither on low calorie diet.        Flor Torres MD  Cell: [phone]  Office: [phone]

## 2021-02-15 NOTE — PROGRESS NOTE ADULT - PROBLEM SELECTOR PLAN 3
rocky consulted for uncontrolled dm2- A1C 9.2;   Continue on Lantus and admelog  accuchecks ac and hs  diabetic diet

## 2021-02-15 NOTE — PROGRESS NOTE ADULT - SUBJECTIVE AND OBJECTIVE BOX
Chief complaint  Patient is a 70y old  Female who presents with a chief complaint of NSTEMI (15 Feb 2021 11:20)   Review of systems  Patient in bed, looks comfortable, no hypoglycemic episodes.    Labs and Fingersticks  CAPILLARY BLOOD GLUCOSE      POCT Blood Glucose.: 261 mg/dL (15 Feb 2021 11:42)  POCT Blood Glucose.: 184 mg/dL (15 Feb 2021 07:48)  POCT Blood Glucose.: 145 mg/dL (14 Feb 2021 21:39)  POCT Blood Glucose.: 88 mg/dL (14 Feb 2021 16:57)      Anion Gap, Serum: 12 (02-15 @ 04:18)  Anion Gap, Serum: 12 (02-14 @ 06:10)      Calcium, Total Serum: 9.4 (02-15 @ 04:18)  Calcium, Total Serum: 9.6 (02-14 @ 06:10)          02-15    137  |  105  |  25<H>  ----------------------------<  189<H>  4.0   |  20<L>  |  0.71    Ca    9.4      15 Feb 2021 04:18                          11.8   9.33  )-----------( 251      ( 15 Feb 2021 04:18 )             36.9     Medications  MEDICATIONS  (STANDING):  aspirin enteric coated 81 milliGRAM(s) Oral daily  atorvastatin 40 milliGRAM(s) Oral at bedtime  dextrose 40% Gel 15 Gram(s) Oral once  dextrose 50% Injectable 25 Gram(s) IV Push once  dorzolamide 2% Ophthalmic Solution 1 Drop(s) Both EYES <User Schedule>  heparin  Infusion 1050 Unit(s)/Hr (5.5 mL/Hr) IV Continuous <Continuous>  insulin glargine Injectable (LANTUS) 32 Unit(s) SubCutaneous at bedtime  insulin lispro (ADMELOG) corrective regimen sliding scale   SubCutaneous three times a day before meals  insulin lispro (ADMELOG) corrective regimen sliding scale   SubCutaneous at bedtime  insulin lispro Injectable (ADMELOG) 12 Unit(s) SubCutaneous three times a day before meals  levothyroxine 100 MICROGram(s) Oral daily  metoprolol tartrate 50 milliGRAM(s) Oral two times a day      Physical Exam  General: Patient comfortable in bed  Vital Signs Last 12 Hrs  T(F): 98 (02-15-21 @ 05:07), Max: 98 (02-15-21 @ 05:07)  HR: 74 (02-15-21 @ 05:07) (74 - 74)  BP: 106/70 (02-15-21 @ 05:07) (106/70 - 106/70)  BP(mean): 83 (02-15-21 @ 05:07) (83 - 83)  RR: 18 (02-15-21 @ 05:07) (18 - 18)  SpO2: 99% (02-15-21 @ 05:07) (99% - 99%)  Neck: No palpable thyroid nodules.  CVS: S1S2, No murmurs  Respiratory: No wheezing, no crepitations  GI: Abdomen soft, bowel sounds positive  Musculoskeletal:  edema lower extremities.   Skin: No skin rashes, no ecchymosis    Diagnostics    Free Thyroxine, Serum: AM Sched. Collection: 15-Feb-2021 06:00 (02-14 @ 14:33)           Chief complaint  Patient is a 70y old  Female who presents with a chief complaint of NSTEMI (15 Feb 2021 11:20)   Review of systems  Patient in bed, looks comfortable, no hypoglycemic episodes.    Labs and Fingersticks  CAPILLARY BLOOD GLUCOSE      POCT Blood Glucose.: 261 mg/dL (15 Feb 2021 11:42)  POCT Blood Glucose.: 184 mg/dL (15 Feb 2021 07:48)  POCT Blood Glucose.: 145 mg/dL (14 Feb 2021 21:39)  POCT Blood Glucose.: 88 mg/dL (14 Feb 2021 16:57)      Anion Gap, Serum: 12 (02-15 @ 04:18)  Anion Gap, Serum: 12 (02-14 @ 06:10)      Calcium, Total Serum: 9.4 (02-15 @ 04:18)  Calcium, Total Serum: 9.6 (02-14 @ 06:10)          02-15    137  |  105  |  25<H>  ----------------------------<  189<H>  4.0   |  20<L>  |  0.71    Ca    9.4      15 Feb 2021 04:18                          11.8   9.33  )-----------( 251      ( 15 Feb 2021 04:18 )             36.9     Medications  MEDICATIONS  (STANDING):  aspirin enteric coated 81 milliGRAM(s) Oral daily  atorvastatin 40 milliGRAM(s) Oral at bedtime  dextrose 40% Gel 15 Gram(s) Oral once  dextrose 50% Injectable 25 Gram(s) IV Push once  dorzolamide 2% Ophthalmic Solution 1 Drop(s) Both EYES <User Schedule>  heparin  Infusion 1050 Unit(s)/Hr (5.5 mL/Hr) IV Continuous <Continuous>  insulin glargine Injectable (LANTUS) 32 Unit(s) SubCutaneous at bedtime  insulin lispro (ADMELOG) corrective regimen sliding scale   SubCutaneous three times a day before meals  insulin lispro (ADMELOG) corrective regimen sliding scale   SubCutaneous at bedtime  insulin lispro Injectable (ADMELOG) 12 Unit(s) SubCutaneous three times a day before meals  levothyroxine 100 MICROGram(s) Oral daily  metoprolol tartrate 50 milliGRAM(s) Oral two times a day      Physical Exam  General: Patient comfortable in bed  Vital Signs Last 12 Hrs  T(F): 98 (02-15-21 @ 05:07), Max: 98 (02-15-21 @ 05:07)  HR: 74 (02-15-21 @ 05:07) (74 - 74)  BP: 106/70 (02-15-21 @ 05:07) (106/70 - 106/70)  BP(mean): 83 (02-15-21 @ 05:07) (83 - 83)  RR: 18 (02-15-21 @ 05:07) (18 - 18)  SpO2: 99% (02-15-21 @ 05:07) (99% - 99%)  Neck: No palpable thyroid nodules.  CVS: S1S2, No murmurs  Respiratory: No wheezing, no crepitations  GI: Abdomen soft, bowel sounds positive  Musculoskeletal:  edema lower extremities.   Skin: No skin rashes, no ecchymosis    Diagnostics    Free Thyroxine, Serum: AM Sched. Collection: 15-Feb-2021 06:00 (02-14 @ 14:33)

## 2021-02-15 NOTE — PROGRESS NOTE ADULT - PROBLEM SELECTOR PLAN 1
Will increase Lantus to 32u at bedtime.  Will increase Admelog to 12u before each meal and continue Admelog correction scale coverage. Will continue monitoring FS and FU.  Patient counseled for compliance with consistent low carb diet and exercise as tolerated outpatient.

## 2021-02-15 NOTE — PROGRESS NOTE ADULT - PROBLEM SELECTOR PLAN 1
continue preop workup   continue asa statin b-blockers and hep gttp  arb d/c   echo/ carotids done  repeat p2y12 248  shower qd  OR 2/17 with Dr. ball

## 2021-02-15 NOTE — PROGRESS NOTE ADULT - ASSESSMENT
71 yo Female (denies implanted devices) with significant PMHx of former 5 pack year cigarette smoker, T2DM (last HgA1c unknown, managed by endocrinologist in [place], denies complications), hypothyroidism, HTN, HLD admitted to [facility] on 2/8/2021 with NSTEMI and transferred to [facility] 2/11/21 for LHC with Dr. Duggan.  LHC revealed 80-90% stenosis to LAD, 80-90% stenosis to prox diag, 80-90% to prox Cx, 70-80 to mid/distal RCA. CTS consult called to evaluate patient for cardiac surgery.    2/12 Pre op Cardiac Surgery in progress. D/C Brilinta.   P2Y12 94 . Tentatively scheduled for Cardiac surgery with Dr. Adhikari on Wednesday 2nd case 2/17 2/13 VSS; rsr 60-70; stable at this time echo neg; carotids neg; repeat P2y12 in am; endo consulted for uncontrolled dm2- A1C 9.2; pt placed on lantus and admelog   continue asa/ statin/ b-blockers; hep gttp; ARB d/c preop   OR 2/17 with Dr. Adhikari   2/14 VSS - no c/o cp this am - maintain hep gtt & bb-will inc. to 50mg po bid-or tue  2/15 VSS- no chest pain today - continue on hep gtt

## 2021-02-15 NOTE — PROGRESS NOTE ADULT - SUBJECTIVE AND OBJECTIVE BOX
VITAL SIGNS    Telemetry:  SR 70  Vital Signs Last 24 Hrs  T(C): 36.7 (02-15-21 @ 05:07), Max: 36.7 (02-15-21 @ 05:07)  T(F): 98 (02-15-21 @ 05:07), Max: 98 (02-15-21 @ 05:07)  HR: 74 (02-15-21 @ 05:07) (59 - 82)  BP: 106/70 (02-15-21 @ 05:07) (106/70 - 145/82)  RR: 18 (02-15-21 @ 05:07) (18 - 18)  SpO2: 99% (02-15-21 @ 05:07) (95% - 99%)             @ 07:01  -  02-15 @ 07:00  --------------------------------------------------------  IN: 818.5 mL / OUT: 1190 mL / NET: -371.5 mL    02-15 @ 07:01  -  02-15 @ 11:20  --------------------------------------------------------  IN: 120 mL / OUT: 250 mL / NET: -130 mL       Daily     Daily Weight in k.8 (15 Feb 2021 08:19)  Admit Wt: Drug Dosing Weight  Height (cm): 151 (2021 18:55)  Weight (kg): 74.843 (2021 18:55)  BMI (kg/m2): 32.8 (2021 18:55)  BSA (m2): 1.71 (2021 18:55)      CAPILLARY BLOOD GLUCOSE      POCT Blood Glucose.: 184 mg/dL (15 Feb 2021 07:48)  POCT Blood Glucose.: 145 mg/dL (2021 21:39)  POCT Blood Glucose.: 88 mg/dL (2021 16:57)          MEDICATIONS  aspirin enteric coated 81 milliGRAM(s) Oral daily  atorvastatin 40 milliGRAM(s) Oral at bedtime  cyclobenzaprine 5 milliGRAM(s) Oral at bedtime PRN  dextrose 40% Gel 15 Gram(s) Oral once  dextrose 50% Injectable 25 Gram(s) IV Push once  dorzolamide 2% Ophthalmic Solution 1 Drop(s) Both EYES <User Schedule>  heparin  Infusion 1050 Unit(s)/Hr IV Continuous <Continuous>  insulin glargine Injectable (LANTUS) 32 Unit(s) SubCutaneous at bedtime  insulin lispro (ADMELOG) corrective regimen sliding scale   SubCutaneous three times a day before meals  insulin lispro (ADMELOG) corrective regimen sliding scale   SubCutaneous at bedtime  insulin lispro Injectable (ADMELOG) 12 Unit(s) SubCutaneous three times a day before meals  levothyroxine 100 MICROGram(s) Oral daily  metoprolol tartrate 50 milliGRAM(s) Oral two times a day  nitroglycerin     SubLingual 0.4 milliGRAM(s) SubLingual every 5 minutes PRN      >>> <<<  PHYSICAL EXAM  Subjective:  "Ilia"   Neurology: alert and oriented x 3, nonfocal, no gross deficits  CV : RRR S1S2, no murmurs, rubs or gallops   Lungs: CTA B/L, No wheezing, rales, rhonchi. Non labored respirations   Abdomen: soft, NT,ND, (+ )BM  :  voiding  Extremities: No LE edema bilaterally, +DP, No calf tenderness     LABS  02-15    137  |  105  |  25<H>  ----------------------------<  189<H>  4.0   |  20<L>  |  0.71    Ca    9.4      15 Feb 2021 04:18                                   11.8   9.33  )-----------( 251      ( 15 Feb 2021 04:18 )             36.9          PT/INR - ( 2021 06:09 )   PT: 14.1 sec;   INR: 1.18 ratio         PTT - ( 15 Feb 2021 10:22 )  PTT:56.1 sec       PAST MEDICAL & SURGICAL HISTORY:  HLD (hyperlipidemia)    Hypothyroidism    DM (diabetes mellitus)    HTN (hypertension)    S/P [id number] (total abdominal hysterectomy)

## 2021-02-16 ENCOUNTER — TRANSCRIPTION ENCOUNTER (OUTPATIENT)
Age: 71
End: 2021-02-16

## 2021-02-16 PROBLEM — I10 ESSENTIAL (PRIMARY) HYPERTENSION: Chronic | Status: ACTIVE | Noted: 2021-02-11

## 2021-02-16 PROBLEM — Z00.00 ENCOUNTER FOR PREVENTIVE HEALTH EXAMINATION: Status: ACTIVE | Noted: 2021-02-16

## 2021-02-16 PROBLEM — E78.5 HYPERLIPIDEMIA, UNSPECIFIED: Chronic | Status: ACTIVE | Noted: 2021-02-11

## 2021-02-16 PROBLEM — E03.9 HYPOTHYROIDISM, UNSPECIFIED: Chronic | Status: ACTIVE | Noted: 2021-02-11

## 2021-02-16 PROBLEM — E11.9 TYPE 2 DIABETES MELLITUS WITHOUT COMPLICATIONS: Chronic | Status: ACTIVE | Noted: 2021-02-11

## 2021-02-16 LAB
ANION GAP SERPL CALC-SCNC: 11 MMOL/L — SIGNIFICANT CHANGE UP (ref 5–17)
APTT BLD: 50.7 SEC — HIGH (ref 27.5–35.5)
APTT BLD: 52.3 SEC — HIGH (ref 27.5–35.5)
APTT BLD: 52.7 SEC — HIGH (ref 27.5–35.5)
BLD GP AB SCN SERPL QL: NEGATIVE — SIGNIFICANT CHANGE UP
BUN SERPL-MCNC: 21 MG/DL — SIGNIFICANT CHANGE UP (ref 7–23)
CALCIUM SERPL-MCNC: 9.7 MG/DL — SIGNIFICANT CHANGE UP (ref 8.4–10.5)
CHLORIDE SERPL-SCNC: 107 MMOL/L — SIGNIFICANT CHANGE UP (ref 96–108)
CO2 SERPL-SCNC: 22 MMOL/L — SIGNIFICANT CHANGE UP (ref 22–31)
CREAT SERPL-MCNC: 0.8 MG/DL — SIGNIFICANT CHANGE UP (ref 0.5–1.3)
GLUCOSE BLDC GLUCOMTR-MCNC: 124 MG/DL — HIGH (ref 70–99)
GLUCOSE BLDC GLUCOMTR-MCNC: 135 MG/DL — HIGH (ref 70–99)
GLUCOSE BLDC GLUCOMTR-MCNC: 170 MG/DL — HIGH (ref 70–99)
GLUCOSE BLDC GLUCOMTR-MCNC: 180 MG/DL — HIGH (ref 70–99)
GLUCOSE BLDC GLUCOMTR-MCNC: 315 MG/DL — HIGH (ref 70–99)
GLUCOSE BLDC GLUCOMTR-MCNC: 76 MG/DL — SIGNIFICANT CHANGE UP (ref 70–99)
GLUCOSE SERPL-MCNC: 131 MG/DL — HIGH (ref 70–99)
HCT VFR BLD CALC: 35.6 % — SIGNIFICANT CHANGE UP (ref 34.5–45)
HGB BLD-MCNC: 11.5 G/DL — SIGNIFICANT CHANGE UP (ref 11.5–15.5)
MCHC RBC-ENTMCNC: 29.8 PG — SIGNIFICANT CHANGE UP (ref 27–34)
MCHC RBC-ENTMCNC: 32.3 GM/DL — SIGNIFICANT CHANGE UP (ref 32–36)
MCV RBC AUTO: 92.2 FL — SIGNIFICANT CHANGE UP (ref 80–100)
NRBC # BLD: 0 /100 WBCS — SIGNIFICANT CHANGE UP (ref 0–0)
PLATELET # BLD AUTO: 234 K/UL — SIGNIFICANT CHANGE UP (ref 150–400)
POTASSIUM SERPL-MCNC: 4 MMOL/L — SIGNIFICANT CHANGE UP (ref 3.5–5.3)
POTASSIUM SERPL-SCNC: 4 MMOL/L — SIGNIFICANT CHANGE UP (ref 3.5–5.3)
RBC # BLD: 3.86 M/UL — SIGNIFICANT CHANGE UP (ref 3.8–5.2)
RBC # FLD: 12.8 % — SIGNIFICANT CHANGE UP (ref 10.3–14.5)
RH IG SCN BLD-IMP: POSITIVE — SIGNIFICANT CHANGE UP
SARS-COV-2 RNA SPEC QL NAA+PROBE: SIGNIFICANT CHANGE UP
SODIUM SERPL-SCNC: 140 MMOL/L — SIGNIFICANT CHANGE UP (ref 135–145)
T3 SERPL-MCNC: 102 NG/DL — SIGNIFICANT CHANGE UP (ref 80–200)
T4 AB SER-ACNC: 9.6 UG/DL — SIGNIFICANT CHANGE UP (ref 4.6–12)
TSH SERPL-MCNC: 7.7 UIU/ML — HIGH (ref 0.27–4.2)
WBC # BLD: 8.37 K/UL — SIGNIFICANT CHANGE UP (ref 3.8–10.5)
WBC # FLD AUTO: 8.37 K/UL — SIGNIFICANT CHANGE UP (ref 3.8–10.5)

## 2021-02-16 PROCEDURE — 94010 BREATHING CAPACITY TEST: CPT | Mod: 26

## 2021-02-16 RX ORDER — CEFUROXIME AXETIL 250 MG
1500 TABLET ORAL ONCE
Refills: 0 | Status: DISCONTINUED | OUTPATIENT
Start: 2021-02-16 | End: 2021-02-17

## 2021-02-16 RX ORDER — CHLORHEXIDINE GLUCONATE 213 G/1000ML
30 SOLUTION TOPICAL ONCE
Refills: 0 | Status: COMPLETED | OUTPATIENT
Start: 2021-02-16 | End: 2021-02-17

## 2021-02-16 RX ORDER — CHLORHEXIDINE GLUCONATE 213 G/1000ML
1 SOLUTION TOPICAL ONCE
Refills: 0 | Status: COMPLETED | OUTPATIENT
Start: 2021-02-16 | End: 2021-02-16

## 2021-02-16 RX ADMIN — CHLORHEXIDINE GLUCONATE 1 APPLICATION(S): 213 SOLUTION TOPICAL at 21:59

## 2021-02-16 RX ADMIN — ATORVASTATIN CALCIUM 40 MILLIGRAM(S): 80 TABLET, FILM COATED ORAL at 22:08

## 2021-02-16 RX ADMIN — HEPARIN SODIUM 7 UNIT(S)/HR: 5000 INJECTION INTRAVENOUS; SUBCUTANEOUS at 14:25

## 2021-02-16 RX ADMIN — Medication 12 UNIT(S): at 11:56

## 2021-02-16 RX ADMIN — Medication 1: at 08:18

## 2021-02-16 RX ADMIN — Medication 50 MILLIGRAM(S): at 17:31

## 2021-02-16 RX ADMIN — DORZOLAMIDE HYDROCHLORIDE 1 DROP(S): 20 SOLUTION/ DROPS OPHTHALMIC at 22:09

## 2021-02-16 RX ADMIN — INSULIN GLARGINE 32 UNIT(S): 100 INJECTION, SOLUTION SUBCUTANEOUS at 22:08

## 2021-02-16 RX ADMIN — Medication 100 MICROGRAM(S): at 05:39

## 2021-02-16 RX ADMIN — HEPARIN SODIUM 6.5 UNIT(S)/HR: 5000 INJECTION INTRAVENOUS; SUBCUTANEOUS at 08:21

## 2021-02-16 RX ADMIN — Medication 4: at 11:56

## 2021-02-16 RX ADMIN — Medication 12 UNIT(S): at 17:30

## 2021-02-16 RX ADMIN — DORZOLAMIDE HYDROCHLORIDE 1 DROP(S): 20 SOLUTION/ DROPS OPHTHALMIC at 08:20

## 2021-02-16 RX ADMIN — Medication 12 UNIT(S): at 08:18

## 2021-02-16 RX ADMIN — Medication 81 MILLIGRAM(S): at 11:55

## 2021-02-16 RX ADMIN — Medication 50 MILLIGRAM(S): at 05:39

## 2021-02-16 RX ADMIN — CHLORHEXIDINE GLUCONATE 1 APPLICATION(S): 213 SOLUTION TOPICAL at 22:00

## 2021-02-16 NOTE — PROGRESS NOTE ADULT - SUBJECTIVE AND OBJECTIVE BOX
Cardiac Surgery Pre-op Note:    CC: Patient is a 70y old  Female who presents with a chief complaint of NSTEMI (15 Feb 2021 11:20)      Referring Physician:                                                                                                           Surgeon: Dr. Adhikari     Procedure: (Date) (Procedure) CABG    Allergies    No Known Allergies    Intolerances      HPI:  70 year old female (denies implanted devices) with significant PMHx of former 5 pack year cigarette smoker, T2DM (last HgA1c unknown, managed by endocrinologist in [place], denies complications), hypothyroidism, HTN, HLD admitted to [facility] on 2/8/2021 with NSTEMI and transferred to [facility] for LHC with Dr. Duggan. Upon admission to [facility], patient c/o chest pain that radiated to jaw and both arms that began 8 days ago. She had 4 episodes that began with mild exertion and progressively became worse and started with rest. Patient travels frequently back and forth from [place], most recent trip from [place] was 3 months ago. No troponin levels available in transfer documents. LHC revealed 80-90% stenosis to LAD, 80-90% stenosis to prox diag, 80-90% to prox Cx, 70-80 to mid/distal RCA and recommended for CTS eval for CABG.     2/11/2021 WBC 6.84, HGB 12.8, HCT 38.4, , , K 4.1, Cl 108, BUN 20, Cr 0.8, GFR >60        [place] [id number]   (11 Feb 2021 18:53)      PAST MEDICAL & SURGICAL HISTORY:  HLD (hyperlipidemia)    Hypothyroidism    DM (diabetes mellitus)    HTN (hypertension)    S/P VLADISLAV (total abdominal hysterectomy)        MEDICATIONS  (STANDING):  aspirin enteric coated 81 milliGRAM(s) Oral daily  atorvastatin 40 milliGRAM(s) Oral at bedtime  dextrose 40% Gel 15 Gram(s) Oral once  dextrose 50% Injectable 25 Gram(s) IV Push once  dorzolamide 2% Ophthalmic Solution 1 Drop(s) Both EYES <User Schedule>  heparin  Infusion 1050 Unit(s)/Hr (6.5 mL/Hr) IV Continuous <Continuous>  insulin glargine Injectable (LANTUS) 32 Unit(s) SubCutaneous at bedtime  insulin lispro (ADMELOG) corrective regimen sliding scale   SubCutaneous three times a day before meals  insulin lispro (ADMELOG) corrective regimen sliding scale   SubCutaneous at bedtime  insulin lispro Injectable (ADMELOG) 12 Unit(s) SubCutaneous three times a day before meals  levothyroxine 100 MICROGram(s) Oral daily  metoprolol tartrate 50 milliGRAM(s) Oral two times a day    MEDICATIONS  (PRN):  cyclobenzaprine 5 milliGRAM(s) Oral at bedtime PRN Muscle Spasm  nitroglycerin     SubLingual 0.4 milliGRAM(s) SubLingual every 5 minutes PRN Chest Pain        Labs:                        11.5   8.37  )-----------( 234      ( 16 Feb 2021 05:29 )             35.6     02-16    140  |  107  |  21  ----------------------------<  131<H>  4.0   |  22  |  0.80    Ca    9.7      16 Feb 2021 05:29      PTT - ( 16 Feb 2021 05:29 )  PTT:52.7 sec    Blood Type: ABO Interpretation: A (02-16 @ 05:45)    HGB A1C:   Prealbumin:     Pro-BNP: Serum Pro-Brain Natriuretic Peptide: 35 pg/mL (02-12 @ 12:28)    Thyroid Panel: 02-15 @ 08:31/--  1.4/--/--  02-12 @ 14:59/6.26  --/10.3/98    MRSA: MRSA PCR Result.: NotDetec (02-12 @ 14:59)   / MSSA:       CXR:   < from: Xray Chest 2 Views PA/Lat (02.12.21 @ 20:32) >  FINDINGS:  The cardiac silhouette is normal in size. There are no focal consolidations or pleural effusions. The hilar and mediastinal structures appear unremarkable. The osseous structures are intact.    IMPRESSION: Normal chest radiograph.    < end of copied text >    EKG:  < from: 12 Lead ECG (02.13.21 @ 15:28) >    Ventricular Rate 73 BPM    Atrial Rate 73 BPM    P-R Interval 130 ms    QRS Duration 76 ms    Q-T Interval 396 ms    QTC Calculation(Bazett) 436 ms    P Axis 58 degrees    R Axis 13 degrees    T Axis 22 degrees    Diagnosis Line NORMAL SINUS RHYTHM  NONSPECIFIC ST ABNORMALITY  ABNORMAL ECG    Carotid Duplex:      < from: VA Duplex Carotid, Bilat (02.12.21 @ 14:33) >  FINDINGS:    There is minimal atheromatous plaque. No disturbed color-flow or elevated blood flow velocities are detected.    Peak systolic velocities are as follows:    RIGHT:  PROX CCA = 70 cm/s  DIST CCA = 67 cm/s  PROX ICA = 47 cm/s  DIST ICA = 40 cm/s  ECA = 105 cm/s    LEFT:  PROX CCA = 98 cm/s  DIST CCA = 79 cm/s  PROX ICA = 49 cm/s  DIST ICA = 85 cm/s  ECA = 110 cm/s    Antegrade flow is noted within both vertebral arteries.    IMPRESSION: Minimal atheromatous plaque without duplex evidence of hemodynamically significant stenosis in either carotid artery.    Measurement of carotid stenosis is based on velocity parameters that correlate the residualinternal carotid diameter with that of the more distal vessel in accordance with a method such as the [employer] American Symptomatic Carotid Endarterectomy Trial (NASCET).    < end of copied text >  PFT's:    Echocardiogram:  < from: TTE with Doppler (w/Cont) (02.12.21 @ 18:49) >  Dimensions:    Normal Values:  LA:     2.9    2.0 - 4.0 cm  Ao:     3.2    2.0 - 3.8 cm  SEPTUM: 1.0 0.6 - 1.2 cm  PWT:    1.1    0.6 - 1.1 cm  LVIDd:  4.3    3.0 - 5.6 cm  LVIDs:         1.8 - 4.0 cm  Derived variables:  LVMI: 90 g/m2  RWT: 0.51  EF (Visual Estimate): 60 %  ------------------------------------------------------------------------  Observations:  Mitral Valve: Mitral annular calcification and calcified  mitral leaflets with normal diastolic opening.  Aortic Valve/Aorta: Calcified trileaflet aortic valve with  normal opening.  Aortic Root: 3.2 cm.  Left Atrium: Left atrium not well visualized.  Left Ventricle: Endocardial visualization enhanced with  intravenous injection of Ultrasonic Enhancing Agent  (Definity). Overall preserved left ventricular ejection  fraction. The basal inferior wall, and the basal  inferoseptum are hypokinetic.  Increased relative wall  thickness with normal left ventricular mass index,  consistent with concentric left ventricular remodeling.  Reversal of the E-A  waves of the mitral inflow pattern is  consistent with diastolic LV dysfunction.  Right Heart: Right atrium not well visualized. The right  ventricle is not well visualized; grossly normal right  ventricular systolic function. Tricuspid valve not well  visualized. Mild tricuspid regurgitation. Pulmonic valve  not well visualized.  Pericardium/Pleura: Normal pericardium with no pericardial  effusion.  Hemodynamic: Estimated right atrial pressure is 8 mm Hg.  Estimated right ventricular systolic pressure equals 24 mm  Hg, assuming right atrial pressure equals 8 mm Hg,  consistent with normal pulmonary pressures.  ------------------------------------------------------------------------  Conclusions:  1. Increased relative wall thickness with normal left  ventricular mass index, consistent with concentric left  ventricular remodeling.  2. Endocardial visualization enhanced with intravenous  injection of Ultrasonic Enhancing Agent (Definity). Overall  preserved left ventricular ejection fraction. The basal  inferior wall, and the basal inferoseptum are hypokinetic.  3. Reversal of the E-A  waves of the mitral inflow pattern  is consistent with diastolic LV dysfunction.  4. The right ventricle is not well visualized; grossly  normal right ventricular systolic function.  *** No previous Echo exam.    < end of copied text >      Cardiac catheterization:  c< from: Cardiac Cath Lab - Adult (02.11.21 @ 17:23) >  INDICATIONS: Other chest pain.  HISTORY: 70 year old woman with PMHx of DM2, HTN, HLD, former smoker who  presents with EKG changes transiently at [facility], here for cardiac  catheterization. There was no prior cardiac history. The patient has oral  hypoglycemic-treated diabetes, dyslipidemia, and a former history of  cigarette use.  PROCEDURE:  --  Left heart catheterization.  --  Left coronary angiography.  --  Right coronary angiography.  TECHNIQUE: The risks and alternatives of the procedures and conscious  sedation were explained to the patient and informed consent was obtained.  Cardiac catheterization performed urgently.  Local anesthetic given. Right radial artery access. Left heart  catheterization. Left coronary artery angiography. The vessel was injected  utilizing a catheter. Right coronary artery angiography. The vessel was  injected utilizing a catheter. RADIATION EXPOSURE: 5.9 min.  CONTRAST GIVEN: Omnipaque 36 ml. An IABP was not used.  MEDICATIONS GIVEN: Fentanyl, 25 mcg, IV. Midazolam, 1 mg, IV.  Nitroglycerin, 200 mcg, intracoronary. Heparin, 3000 units, IV. Cardene,  500 mcg.  CORONARY VESSELS: The coronary circulation is right dominant.  LM:   --  LM: Angiography showed minor luminal irregularities with no flow  limiting lesions.  LAD:   --  Mid LAD: There was a 70 % stenosis.  --  Distal LAD: There was a 80% stenosis.  --  D1: There was a 90 % stenosis at the ostium of the vessel segment.  CX:   --  Proximal circumflex: There was a 70 % stenosis.  RCA:   --  Distal RCA: There was a 90 % stenosis.  COMPLICATIONS: There were no complications.  DIAGNOSTICRECOMMENDATIONS: Severe multivessel disease. Reccomend CTS  evaluation for bypass surgery. Hold P2Y12 agent. Continue aspirin as well  as heparin gtt. Obtain TTE.  INTERVENTIONAL RECOMMENDATIONS: Severe multivessel disease. Reccomend CTS  evaluation for bypass surgery. Hold P2Y12 agent. Continue aspirin as well  as heparin gtt. Obtain TTE.  Prepared and signed by  Anshul Duggan M.D.  Signed 02/11/2021 19:27:40    < end of copied text >    Vein Mapping:    Gen: WN/WD NAD  Neuro: AAOx3, nonfocal  Pulm: CTA B/L  CV: RRR, S1S2  Abd: Soft, NT, ND +BS  Ext: No edema, + peripheral pulses      Pt has AICD/PPM [ ] Yes  [X] No             Brand Name:  Pre-op Beta Blocker ordered within 24 hrs of surgery (CABG ONLY)?  [X ] Yes  [ ] No  If not, Why?  Type & Cross  [X ] Yes  [ ] No  NPO after Midnight [X ] Yes  [ ] No  Pre-op ABX ordered, to be taped on chart:  [X ] Yes  [ ] No     Hibiclens/Peridex ordered [X ] Yes  [ ] No  Intraop on Hold: PRBCs, CXR, JUAN CARLOS [X ]   Consent obtained  [X ] Yes  [ ] No

## 2021-02-16 NOTE — PROGRESS NOTE ADULT - SUBJECTIVE AND OBJECTIVE BOX
Chief complaint  Patient is a 70y old  Female who presents with a chief complaint of Preop CAD (16 Feb 2021 10:24)   Review of systems  Patient in bed, looks comfortable, no hypoglycemic episodes.    Labs and Fingersticks  CAPILLARY BLOOD GLUCOSE      POCT Blood Glucose.: 315 mg/dL (16 Feb 2021 11:44)  POCT Blood Glucose.: 180 mg/dL (16 Feb 2021 07:48)  POCT Blood Glucose.: 124 mg/dL (16 Feb 2021 03:52)  POCT Blood Glucose.: 89 mg/dL (15 Feb 2021 21:42)  POCT Blood Glucose.: 72 mg/dL (15 Feb 2021 16:50)      Anion Gap, Serum: 11 (02-16 @ 05:29)  Anion Gap, Serum: 12 (02-15 @ 04:18)      Calcium, Total Serum: 9.7 (02-16 @ 05:29)  Calcium, Total Serum: 9.4 (02-15 @ 04:18)          02-16    140  |  107  |  21  ----------------------------<  131<H>  4.0   |  22  |  0.80    Ca    9.7      16 Feb 2021 05:29                          11.5   8.37  )-----------( 234      ( 16 Feb 2021 05:29 )             35.6     Medications  MEDICATIONS  (STANDING):  aspirin enteric coated 81 milliGRAM(s) Oral daily  atorvastatin 40 milliGRAM(s) Oral at bedtime  cefuroxime  IVPB 1500 milliGRAM(s) IV Intermittent once  chlorhexidine 0.12% Liquid 30 milliLiter(s) Swish and Spit once  chlorhexidine 4% Liquid 1 Application(s) Topical once  chlorhexidine 4% Liquid 1 Application(s) Topical once  dextrose 40% Gel 15 Gram(s) Oral once  dextrose 50% Injectable 25 Gram(s) IV Push once  dorzolamide 2% Ophthalmic Solution 1 Drop(s) Both EYES <User Schedule>  heparin  Infusion 1050 Unit(s)/Hr (6.5 mL/Hr) IV Continuous <Continuous>  insulin glargine Injectable (LANTUS) 32 Unit(s) SubCutaneous at bedtime  insulin lispro (ADMELOG) corrective regimen sliding scale   SubCutaneous three times a day before meals  insulin lispro (ADMELOG) corrective regimen sliding scale   SubCutaneous at bedtime  insulin lispro Injectable (ADMELOG) 12 Unit(s) SubCutaneous three times a day before meals  levothyroxine 100 MICROGram(s) Oral daily  metoprolol tartrate 50 milliGRAM(s) Oral two times a day      Physical Exam  General: Patient comfortable in bed  Vital Signs Last 12 Hrs  T(F): 98.2 (02-16-21 @ 05:25), Max: 98.2 (02-16-21 @ 05:25)  HR: 67 (02-16-21 @ 05:25) (67 - 67)  BP: 128/70 (02-16-21 @ 05:25) (128/70 - 128/70)  BP(mean): --  RR: 18 (02-16-21 @ 05:25) (18 - 18)  SpO2: 97% (02-16-21 @ 05:25) (97% - 97%)  Neck: No palpable thyroid nodules.  CVS: S1S2, No murmurs  Respiratory: No wheezing, no crepitations  GI: Abdomen soft, bowel sounds positive  Musculoskeletal:  edema lower extremities.   Skin: No skin rashes, no ecchymosis    Diagnostics    Free Thyroxine, Serum: AM Sched. Collection: 15-Feb-2021 06:00 (02-14 @ 14:33)           Chief complaint  Patient is a 70y old  Female who presents with a chief complaint of Preop CAD (16 Feb 2021 10:24)   Review of systems  Patient in bed, looks comfortable, no hypoglycemic episodes.    Labs and Fingersticks  CAPILLARY BLOOD GLUCOSE      POCT Blood Glucose.: 315 mg/dL (16 Feb 2021 11:44)  POCT Blood Glucose.: 180 mg/dL (16 Feb 2021 07:48)  POCT Blood Glucose.: 124 mg/dL (16 Feb 2021 03:52)  POCT Blood Glucose.: 89 mg/dL (15 Feb 2021 21:42)  POCT Blood Glucose.: 72 mg/dL (15 Feb 2021 16:50)      Anion Gap, Serum: 11 (02-16 @ 05:29)  Anion Gap, Serum: 12 (02-15 @ 04:18)      Calcium, Total Serum: 9.7 (02-16 @ 05:29)  Calcium, Total Serum: 9.4 (02-15 @ 04:18)          02-16    140  |  107  |  21  ----------------------------<  131<H>  4.0   |  22  |  0.80    Ca    9.7      16 Feb 2021 05:29                          11.5   8.37  )-----------( 234      ( 16 Feb 2021 05:29 )             35.6     Medications  MEDICATIONS  (STANDING):  aspirin enteric coated 81 milliGRAM(s) Oral daily  atorvastatin 40 milliGRAM(s) Oral at bedtime  cefuroxime  IVPB 1500 milliGRAM(s) IV Intermittent once  chlorhexidine 0.12% Liquid 30 milliLiter(s) Swish and Spit once  chlorhexidine 4% Liquid 1 Application(s) Topical once  chlorhexidine 4% Liquid 1 Application(s) Topical once  dextrose 40% Gel 15 Gram(s) Oral once  dextrose 50% Injectable 25 Gram(s) IV Push once  dorzolamide 2% Ophthalmic Solution 1 Drop(s) Both EYES <User Schedule>  heparin  Infusion 1050 Unit(s)/Hr (6.5 mL/Hr) IV Continuous <Continuous>  insulin glargine Injectable (LANTUS) 32 Unit(s) SubCutaneous at bedtime  insulin lispro (ADMELOG) corrective regimen sliding scale   SubCutaneous three times a day before meals  insulin lispro (ADMELOG) corrective regimen sliding scale   SubCutaneous at bedtime  insulin lispro Injectable (ADMELOG) 12 Unit(s) SubCutaneous three times a day before meals  levothyroxine 100 MICROGram(s) Oral daily  metoprolol tartrate 50 milliGRAM(s) Oral two times a day      Physical Exam  General: Patient comfortable in bed  Vital Signs Last 12 Hrs  T(F): 98.2 (02-16-21 @ 05:25), Max: 98.2 (02-16-21 @ 05:25)  HR: 67 (02-16-21 @ 05:25) (67 - 67)  BP: 128/70 (02-16-21 @ 05:25) (128/70 - 128/70)  BP(mean): --  RR: 18 (02-16-21 @ 05:25) (18 - 18)  SpO2: 97% (02-16-21 @ 05:25) (97% - 97%)  Neck: No palpable thyroid nodules.  CVS: S1S2, No murmurs  Respiratory: No wheezing, no crepitations  GI: Abdomen soft, bowel sounds positive  Musculoskeletal:  edema lower extremities.   Skin: No skin rashes, no ecchymosis    Diagnostics    Free Thyroxine, Serum: AM Sched. Collection: 15-Feb-2021 06:00 (02-14 @ 14:33)

## 2021-02-16 NOTE — PROGRESS NOTE ADULT - ASSESSMENT
Assessment  DMT2: 70y Female with DM T2 with hyperglycemia admitted with chest pain, A1C 9.2%, was on insulin at home, now on basal bolus insulin, increased dose yesterday, blood sugars fluctuating, no hypoglycemic episodes, eating meals inconsistently, planning CABG tomorrow.  CAD: Planing surgery on Wed, on medications, monitored, stable.  Hypothyroidism: on Synthroid 100 mcg po daily, compliant with Synthroid intake, subclinical hypothyroid, FT4 1.4.  Overweight: No strict exercise routines, neither on low calorie diet.        Flor Torres MD  Cell: [phone]  Office: [phone]               Assessment  DMT2: 70y Female with DM T2 with hyperglycemia admitted with chest pain, A1C 9.2%, was on insulin at home, now on  basal bolus insulin, increased dose yesterday, blood sugars fluctuating, no hypoglycemic episodes, eating meals inconsistently, planning CABG tomorrow.  CAD: Planing surgery on Wed, on medications, monitored, stable.  Hypothyroidism: on Synthroid 100 mcg po daily, compliant with Synthroid intake, subclinical hypothyroid, FT4 1.4.  Overweight: No strict exercise routines, neither on low calorie diet.        Flor Torres MD  Cell: [phone]  Office: [phone]

## 2021-02-16 NOTE — PROGRESS NOTE ADULT - ASSESSMENT
69 yo Female (denies implanted devices) with significant PMHx of former 5 pack year cigarette smoker, T2DM (last HgA1c unknown, managed by endocrinologist in [place], denies complications), hypothyroidism, HTN, HLD admitted to [facility] on 2/8/2021 with NSTEMI and transferred to [facility] 2/11/21 for LHC with Dr. Duggan.  LHC revealed 80-90% stenosis to LAD, 80-90% stenosis to prox diag, 80-90% to prox Cx, 70-80 to mid/distal RCA. CTS consult called to evaluate patient for cardiac surgery.    2/12 Pre op Cardiac Surgery in progress. D/C Brilinta.   P2Y12 94 . Tentatively scheduled for Cardiac surgery with Dr. Adhikari on Wednesday 2nd case 2/17 2/13 VSS; rsr 60-70; stable at this time echo neg; carotids neg; repeat P2y12 in am; endo consulted for uncontrolled dm2- A1C 9.2; pt placed on lantus and admelog   continue asa/ statin/ b-blockers; hep gttp; ARB d/c preop   OR 2/17 with Dr. Adhikari   2/14 VSS - no c/o cp this am - maintain hep gtt & bb-will inc. to 50mg po bid-or tue  2/15 VSS- no chest pain today - continue on hep gtt  2/16 VSS- No chest pain- continue on hep gtt. Preop workup completed. Plan for CABG with Dr. Adhikari tomorrow.

## 2021-02-17 ENCOUNTER — APPOINTMENT (OUTPATIENT)
Dept: CARDIOTHORACIC SURGERY | Facility: CLINIC | Age: 71
End: 2021-02-17

## 2021-02-17 LAB
ALBUMIN SERPL ELPH-MCNC: 3.3 G/DL — SIGNIFICANT CHANGE UP (ref 3.3–5)
ALP SERPL-CCNC: 34 U/L — LOW (ref 40–120)
ALT FLD-CCNC: 22 U/L — SIGNIFICANT CHANGE UP (ref 10–45)
ANION GAP SERPL CALC-SCNC: 13 MMOL/L — SIGNIFICANT CHANGE UP (ref 5–17)
APTT BLD: 31.3 SEC — SIGNIFICANT CHANGE UP (ref 27.5–35.5)
AST SERPL-CCNC: 40 U/L — SIGNIFICANT CHANGE UP (ref 10–40)
BASOPHILS # BLD AUTO: 0.05 K/UL — SIGNIFICANT CHANGE UP (ref 0–0.2)
BASOPHILS NFR BLD AUTO: 0.4 % — SIGNIFICANT CHANGE UP (ref 0–2)
BILIRUB SERPL-MCNC: 0.9 MG/DL — SIGNIFICANT CHANGE UP (ref 0.2–1.2)
BUN SERPL-MCNC: 16 MG/DL — SIGNIFICANT CHANGE UP (ref 7–23)
CALCIUM SERPL-MCNC: 7.8 MG/DL — LOW (ref 8.4–10.5)
CHLORIDE SERPL-SCNC: 106 MMOL/L — SIGNIFICANT CHANGE UP (ref 96–108)
CK MB BLD-MCNC: 7.6 % — HIGH (ref 0–3.5)
CK MB CFR SERPL CALC: 12.9 NG/ML — HIGH (ref 0–3.8)
CK SERPL-CCNC: 169 U/L — SIGNIFICANT CHANGE UP (ref 25–170)
CO2 SERPL-SCNC: 22 MMOL/L — SIGNIFICANT CHANGE UP (ref 22–31)
CREAT SERPL-MCNC: 0.56 MG/DL — SIGNIFICANT CHANGE UP (ref 0.5–1.3)
EOSINOPHIL # BLD AUTO: 0.16 K/UL — SIGNIFICANT CHANGE UP (ref 0–0.5)
EOSINOPHIL NFR BLD AUTO: 1.2 % — SIGNIFICANT CHANGE UP (ref 0–6)
FIBRINOGEN PPP-MCNC: 379 MG/DL — SIGNIFICANT CHANGE UP (ref 290–520)
GAS PNL BLDA: SIGNIFICANT CHANGE UP
GLUCOSE BLDC GLUCOMTR-MCNC: 113 MG/DL — HIGH (ref 70–99)
GLUCOSE BLDC GLUCOMTR-MCNC: 115 MG/DL — HIGH (ref 70–99)
GLUCOSE BLDC GLUCOMTR-MCNC: 127 MG/DL — HIGH (ref 70–99)
GLUCOSE BLDC GLUCOMTR-MCNC: 140 MG/DL — HIGH (ref 70–99)
GLUCOSE SERPL-MCNC: 159 MG/DL — HIGH (ref 70–99)
HCT VFR BLD CALC: 37.2 % — SIGNIFICANT CHANGE UP (ref 34.5–45)
HGB BLD-MCNC: 12.4 G/DL — SIGNIFICANT CHANGE UP (ref 11.5–15.5)
IMM GRANULOCYTES NFR BLD AUTO: 1.3 % — SIGNIFICANT CHANGE UP (ref 0–1.5)
INR BLD: 1.42 RATIO — HIGH (ref 0.88–1.16)
LYMPHOCYTES # BLD AUTO: 15.1 % — SIGNIFICANT CHANGE UP (ref 13–44)
LYMPHOCYTES # BLD AUTO: 2.01 K/UL — SIGNIFICANT CHANGE UP (ref 1–3.3)
MAGNESIUM SERPL-MCNC: 2.7 MG/DL — HIGH (ref 1.6–2.6)
MCHC RBC-ENTMCNC: 30 PG — SIGNIFICANT CHANGE UP (ref 27–34)
MCHC RBC-ENTMCNC: 33.3 GM/DL — SIGNIFICANT CHANGE UP (ref 32–36)
MCV RBC AUTO: 90.1 FL — SIGNIFICANT CHANGE UP (ref 80–100)
MONOCYTES # BLD AUTO: 0.69 K/UL — SIGNIFICANT CHANGE UP (ref 0–0.9)
MONOCYTES NFR BLD AUTO: 5.2 % — SIGNIFICANT CHANGE UP (ref 2–14)
NEUTROPHILS # BLD AUTO: 10.24 K/UL — HIGH (ref 1.8–7.4)
NEUTROPHILS NFR BLD AUTO: 76.8 % — SIGNIFICANT CHANGE UP (ref 43–77)
NRBC # BLD: 0 /100 WBCS — SIGNIFICANT CHANGE UP (ref 0–0)
PHOSPHATE SERPL-MCNC: 2 MG/DL — LOW (ref 2.5–4.5)
PLATELET # BLD AUTO: 122 K/UL — LOW (ref 150–400)
POTASSIUM SERPL-MCNC: 5.6 MMOL/L — HIGH (ref 3.5–5.3)
POTASSIUM SERPL-SCNC: 5.6 MMOL/L — HIGH (ref 3.5–5.3)
PROT SERPL-MCNC: 5.4 G/DL — LOW (ref 6–8.3)
PROTHROM AB SERPL-ACNC: 16.8 SEC — HIGH (ref 10.6–13.6)
RBC # BLD: 4.13 M/UL — SIGNIFICANT CHANGE UP (ref 3.8–5.2)
RBC # FLD: 13.6 % — SIGNIFICANT CHANGE UP (ref 10.3–14.5)
SODIUM SERPL-SCNC: 141 MMOL/L — SIGNIFICANT CHANGE UP (ref 135–145)
TROPONIN T, HIGH SENSITIVITY RESULT: 142 NG/L — HIGH (ref 0–51)
WBC # BLD: 13.32 K/UL — HIGH (ref 3.8–10.5)
WBC # FLD AUTO: 13.32 K/UL — HIGH (ref 3.8–10.5)

## 2021-02-17 PROCEDURE — 71045 X-RAY EXAM CHEST 1 VIEW: CPT | Mod: 26

## 2021-02-17 PROCEDURE — 33533 CABG ARTERIAL SINGLE: CPT | Mod: AS

## 2021-02-17 PROCEDURE — 93010 ELECTROCARDIOGRAM REPORT: CPT

## 2021-02-17 PROCEDURE — 33508 ENDOSCOPIC VEIN HARVEST: CPT | Mod: 59

## 2021-02-17 PROCEDURE — 33518 CABG ARTERY-VEIN TWO: CPT

## 2021-02-17 PROCEDURE — 33518 CABG ARTERY-VEIN TWO: CPT | Mod: AS

## 2021-02-17 PROCEDURE — 99291 CRITICAL CARE FIRST HOUR: CPT

## 2021-02-17 PROCEDURE — 33533 CABG ARTERIAL SINGLE: CPT

## 2021-02-17 RX ORDER — DORZOLAMIDE HYDROCHLORIDE 20 MG/ML
1 SOLUTION/ DROPS OPHTHALMIC
Refills: 0 | Status: DISCONTINUED | OUTPATIENT
Start: 2021-02-17 | End: 2021-02-23

## 2021-02-17 RX ORDER — GLUCAGON INJECTION, SOLUTION 0.5 MG/.1ML
1 INJECTION, SOLUTION SUBCUTANEOUS ONCE
Refills: 0 | Status: DISCONTINUED | OUTPATIENT
Start: 2021-02-17 | End: 2021-02-23

## 2021-02-17 RX ORDER — CHLORHEXIDINE GLUCONATE 213 G/1000ML
15 SOLUTION TOPICAL EVERY 12 HOURS
Refills: 0 | Status: DISCONTINUED | OUTPATIENT
Start: 2021-02-17 | End: 2021-02-17

## 2021-02-17 RX ORDER — CEFUROXIME AXETIL 250 MG
1500 TABLET ORAL EVERY 8 HOURS
Refills: 0 | Status: COMPLETED | OUTPATIENT
Start: 2021-02-17 | End: 2021-02-18

## 2021-02-17 RX ORDER — POTASSIUM CHLORIDE 20 MEQ
10 PACKET (EA) ORAL
Refills: 0 | Status: COMPLETED | OUTPATIENT
Start: 2021-02-17 | End: 2021-02-17

## 2021-02-17 RX ORDER — ASPIRIN/CALCIUM CARB/MAGNESIUM 324 MG
81 TABLET ORAL DAILY
Refills: 0 | Status: DISCONTINUED | OUTPATIENT
Start: 2021-02-17 | End: 2021-02-23

## 2021-02-17 RX ORDER — CLOPIDOGREL BISULFATE 75 MG/1
75 TABLET, FILM COATED ORAL DAILY
Refills: 0 | Status: DISCONTINUED | OUTPATIENT
Start: 2021-02-18 | End: 2021-02-23

## 2021-02-17 RX ORDER — POLYETHYLENE GLYCOL 3350 17 G/17G
17 POWDER, FOR SOLUTION ORAL DAILY
Refills: 0 | Status: DISCONTINUED | OUTPATIENT
Start: 2021-02-17 | End: 2021-02-23

## 2021-02-17 RX ORDER — ATORVASTATIN CALCIUM 80 MG/1
40 TABLET, FILM COATED ORAL AT BEDTIME
Refills: 0 | Status: DISCONTINUED | OUTPATIENT
Start: 2021-02-17 | End: 2021-02-23

## 2021-02-17 RX ORDER — LEVOTHYROXINE SODIUM 125 MCG
100 TABLET ORAL DAILY
Refills: 0 | Status: DISCONTINUED | OUTPATIENT
Start: 2021-02-17 | End: 2021-02-23

## 2021-02-17 RX ORDER — POTASSIUM CHLORIDE 20 MEQ
10 PACKET (EA) ORAL
Refills: 0 | Status: DISCONTINUED | OUTPATIENT
Start: 2021-02-17 | End: 2021-02-18

## 2021-02-17 RX ORDER — SODIUM CHLORIDE 9 MG/ML
1000 INJECTION INTRAMUSCULAR; INTRAVENOUS; SUBCUTANEOUS
Refills: 0 | Status: DISCONTINUED | OUTPATIENT
Start: 2021-02-17 | End: 2021-02-23

## 2021-02-17 RX ORDER — HYDROMORPHONE HYDROCHLORIDE 2 MG/ML
0.5 INJECTION INTRAMUSCULAR; INTRAVENOUS; SUBCUTANEOUS ONCE
Refills: 0 | Status: DISCONTINUED | OUTPATIENT
Start: 2021-02-17 | End: 2021-02-17

## 2021-02-17 RX ORDER — ACETAMINOPHEN 500 MG
650 TABLET ORAL EVERY 6 HOURS
Refills: 0 | Status: DISCONTINUED | OUTPATIENT
Start: 2021-02-17 | End: 2021-02-23

## 2021-02-17 RX ORDER — ASPIRIN/CALCIUM CARB/MAGNESIUM 324 MG
300 TABLET ORAL ONCE
Refills: 0 | Status: COMPLETED | OUTPATIENT
Start: 2021-02-17

## 2021-02-17 RX ORDER — DEXMEDETOMIDINE HYDROCHLORIDE IN 0.9% SODIUM CHLORIDE 4 UG/ML
0.3 INJECTION INTRAVENOUS
Qty: 400 | Refills: 0 | Status: DISCONTINUED | OUTPATIENT
Start: 2021-02-17 | End: 2021-02-17

## 2021-02-17 RX ORDER — PANTOPRAZOLE SODIUM 20 MG/1
40 TABLET, DELAYED RELEASE ORAL DAILY
Refills: 0 | Status: DISCONTINUED | OUTPATIENT
Start: 2021-02-17 | End: 2021-02-17

## 2021-02-17 RX ORDER — ACETAMINOPHEN 500 MG
1000 TABLET ORAL ONCE
Refills: 0 | Status: COMPLETED | OUTPATIENT
Start: 2021-02-17 | End: 2021-02-17

## 2021-02-17 RX ORDER — CLOPIDOGREL BISULFATE 75 MG/1
75 TABLET, FILM COATED ORAL DAILY
Refills: 0 | Status: COMPLETED | OUTPATIENT
Start: 2021-02-17 | End: 2021-02-17

## 2021-02-17 RX ORDER — FENTANYL CITRATE 50 UG/ML
50 INJECTION INTRAVENOUS ONCE
Refills: 0 | Status: DISCONTINUED | OUTPATIENT
Start: 2021-02-17 | End: 2021-02-17

## 2021-02-17 RX ORDER — DEXTROSE 50 % IN WATER 50 %
15 SYRINGE (ML) INTRAVENOUS ONCE
Refills: 0 | Status: DISCONTINUED | OUTPATIENT
Start: 2021-02-17 | End: 2021-02-23

## 2021-02-17 RX ORDER — PANTOPRAZOLE SODIUM 20 MG/1
40 TABLET, DELAYED RELEASE ORAL
Refills: 0 | Status: DISCONTINUED | OUTPATIENT
Start: 2021-02-17 | End: 2021-02-23

## 2021-02-17 RX ORDER — DEXTROSE 50 % IN WATER 50 %
25 SYRINGE (ML) INTRAVENOUS ONCE
Refills: 0 | Status: DISCONTINUED | OUTPATIENT
Start: 2021-02-17 | End: 2021-02-23

## 2021-02-17 RX ORDER — ASPIRIN/CALCIUM CARB/MAGNESIUM 324 MG
300 TABLET ORAL ONCE
Refills: 0 | Status: COMPLETED | OUTPATIENT
Start: 2021-02-17 | End: 2021-02-17

## 2021-02-17 RX ORDER — OXYCODONE HYDROCHLORIDE 5 MG/1
5 TABLET ORAL EVERY 4 HOURS
Refills: 0 | Status: DISCONTINUED | OUTPATIENT
Start: 2021-02-17 | End: 2021-02-23

## 2021-02-17 RX ORDER — DEXTROSE 50 % IN WATER 50 %
25 SYRINGE (ML) INTRAVENOUS ONCE
Refills: 0 | Status: DISCONTINUED | OUTPATIENT
Start: 2021-02-17 | End: 2021-02-18

## 2021-02-17 RX ORDER — DEXTROSE 50 % IN WATER 50 %
12.5 SYRINGE (ML) INTRAVENOUS ONCE
Refills: 0 | Status: DISCONTINUED | OUTPATIENT
Start: 2021-02-17 | End: 2021-02-18

## 2021-02-17 RX ORDER — CHLORHEXIDINE GLUCONATE 213 G/1000ML
1 SOLUTION TOPICAL
Refills: 0 | Status: DISCONTINUED | OUTPATIENT
Start: 2021-02-17 | End: 2021-02-23

## 2021-02-17 RX ORDER — INSULIN HUMAN 100 [IU]/ML
3 INJECTION, SOLUTION SUBCUTANEOUS
Qty: 100 | Refills: 0 | Status: DISCONTINUED | OUTPATIENT
Start: 2021-02-17 | End: 2021-02-18

## 2021-02-17 RX ADMIN — PANTOPRAZOLE SODIUM 40 MILLIGRAM(S): 20 TABLET, DELAYED RELEASE ORAL at 21:48

## 2021-02-17 RX ADMIN — FENTANYL CITRATE 50 MICROGRAM(S): 50 INJECTION INTRAVENOUS at 16:00

## 2021-02-17 RX ADMIN — PANTOPRAZOLE SODIUM 40 MILLIGRAM(S): 20 TABLET, DELAYED RELEASE ORAL at 15:48

## 2021-02-17 RX ADMIN — Medication 62.5 MILLIMOLE(S): at 18:51

## 2021-02-17 RX ADMIN — Medication 50 MILLIGRAM(S): at 05:42

## 2021-02-17 RX ADMIN — CHLORHEXIDINE GLUCONATE 15 MILLILITER(S): 213 SOLUTION TOPICAL at 17:57

## 2021-02-17 RX ADMIN — Medication 100 MILLIEQUIVALENT(S): at 12:49

## 2021-02-17 RX ADMIN — Medication 100 MICROGRAM(S): at 05:41

## 2021-02-17 RX ADMIN — FENTANYL CITRATE 50 MICROGRAM(S): 50 INJECTION INTRAVENOUS at 14:00

## 2021-02-17 RX ADMIN — HYDROMORPHONE HYDROCHLORIDE 0.5 MILLIGRAM(S): 2 INJECTION INTRAMUSCULAR; INTRAVENOUS; SUBCUTANEOUS at 21:00

## 2021-02-17 RX ADMIN — Medication 100 MICROGRAM(S): at 21:48

## 2021-02-17 RX ADMIN — CHLORHEXIDINE GLUCONATE 1 APPLICATION(S): 213 SOLUTION TOPICAL at 22:12

## 2021-02-17 RX ADMIN — Medication 100 MILLIGRAM(S): at 15:48

## 2021-02-17 RX ADMIN — CHLORHEXIDINE GLUCONATE 30 MILLILITER(S): 213 SOLUTION TOPICAL at 05:43

## 2021-02-17 RX ADMIN — Medication 400 MILLIGRAM(S): at 18:52

## 2021-02-17 RX ADMIN — Medication 100 MILLIEQUIVALENT(S): at 15:01

## 2021-02-17 RX ADMIN — CLOPIDOGREL BISULFATE 75 MILLIGRAM(S): 75 TABLET, FILM COATED ORAL at 21:48

## 2021-02-17 RX ADMIN — INSULIN HUMAN 3 UNIT(S)/HR: 100 INJECTION, SOLUTION SUBCUTANEOUS at 12:30

## 2021-02-17 RX ADMIN — ATORVASTATIN CALCIUM 40 MILLIGRAM(S): 80 TABLET, FILM COATED ORAL at 21:48

## 2021-02-17 RX ADMIN — HYDROMORPHONE HYDROCHLORIDE 0.5 MILLIGRAM(S): 2 INJECTION INTRAMUSCULAR; INTRAVENOUS; SUBCUTANEOUS at 23:37

## 2021-02-17 RX ADMIN — Medication 300 MILLIGRAM(S): at 18:00

## 2021-02-17 NOTE — PROGRESS NOTE ADULT - ASSESSMENT
Assessment  DMT2: 70y Female with DM T2 with hyperglycemia admitted with chest pain, A1C 9.2%, was on insulin at home, on basal bolus insulin preoperatively, now POD0 s/p CABG, started on IV insulin, blood sugars in acceptable range, no hypoglycemic episodes, remains intubated.  CAD: CABG 2/17, on medications, monitored, stable.  Hypothyroidism: on Synthroid 100 mcg po daily, compliant with Synthroid intake, subclinical hypothyroid, FT4 1.4.  Overweight: No strict exercise routines, neither on low calorie diet.        Flor Torres MD  Cell: [phone]  Office: [phone]               Assessment  DMT2: 70y Female with DM T2 with hyperglycemia admitted with chest pain, A1C 9.2%, was on insulin at home, on basal bolus  insulin preoperatively, now POD0 s/p CABG, started on IV insulin, blood sugars in acceptable range, no hypoglycemic episodes, remains intubated.  CAD: CABG 2/17, on medications, monitored, stable.  Hypothyroidism: on Synthroid 100 mcg po daily, compliant with Synthroid intake, subclinical hypothyroid, FT4 1.4.  Overweight: No strict exercise routines, neither on low calorie diet.        Flor Torres MD  Cell: [phone]  Office: [phone]

## 2021-02-17 NOTE — AIRWAY REMOVAL NOTE  ADULT & PEDS - ARTIFICAL AIRWAY REMOVAL COMMENTS
Written order for extubation verified. The patient was identified by full name and birth date compared to the identification band. Present during the procedure was SHAY Booker and MICHAEL Mora.

## 2021-02-17 NOTE — PROGRESS NOTE ADULT - SUBJECTIVE AND OBJECTIVE BOX
Chief complaint  Patient is a 70y old  Female who presents with a chief complaint of Preop CAD (16 Feb 2021 10:24)   Review of systems  Patient is POD0 s/p CABG, on IV insulin, no hypoglycemic episodes.    Labs and Fingersticks  CAPILLARY BLOOD GLUCOSE      POCT Blood Glucose.: 140 mg/dL (17 Feb 2021 14:02)  POCT Blood Glucose.: 135 mg/dL (16 Feb 2021 21:31)  POCT Blood Glucose.: 76 mg/dL (16 Feb 2021 17:00)    Medications  MEDICATIONS  (STANDING):  aspirin enteric coated 81 milliGRAM(s) Oral daily  aspirin Suppository 300 milliGRAM(s) Rectal once  cefuroxime  IVPB 1500 milliGRAM(s) IV Intermittent every 8 hours  chlorhexidine 0.12% Liquid 15 milliLiter(s) Oral Mucosa every 12 hours  chlorhexidine 2% Cloths 1 Application(s) Topical <User Schedule>  clopidogrel Tablet 75 milliGRAM(s) Oral daily  dexMEDEtomidine Infusion 0.3 MICROgram(s)/kG/Hr (5.61 mL/Hr) IV Continuous <Continuous>  dextrose 40% Gel 15 Gram(s) Oral once  dextrose 50% Injectable 25 Gram(s) IV Push once  dextrose 50% Injectable 12.5 Gram(s) IV Push once  dextrose 50% Injectable 25 Gram(s) IV Push once  glucagon  Injectable 1 milliGRAM(s) IntraMuscular once  insulin regular Infusion 3 Unit(s)/Hr (3 mL/Hr) IV Continuous <Continuous>  pantoprazole  Injectable 40 milliGRAM(s) IV Push daily  polyethylene glycol 3350 17 Gram(s) Oral daily  potassium chloride  10 mEq/50 mL IVPB 10 milliEquivalent(s) IV Intermittent every 1 hour  potassium chloride  10 mEq/50 mL IVPB 10 milliEquivalent(s) IV Intermittent every 1 hour  potassium chloride  10 mEq/50 mL IVPB 10 milliEquivalent(s) IV Intermittent every 1 hour  sodium chloride 0.9%. 1000 milliLiter(s) (10 mL/Hr) IV Continuous <Continuous>      Physical Exam  General: Patient comfortable in bed  Vital Signs Last 12 Hrs  T(F): 98.4 (02-17-21 @ 16:00), Max: 98.4 (02-17-21 @ 04:50)  HR: 86 (02-17-21 @ 16:00) (70 - 98)  BP: 137/69 (02-17-21 @ 07:16) (137/69 - 137/69)  BP(mean): --  RR: 16 (02-17-21 @ 16:00) (9 - 35)  SpO2: 100% (02-17-21 @ 16:00) (99% - 100%)     Chief complaint  Patient is a 70y old  Female who presents with a chief complaint of Preop CAD (16 Feb 2021 10:24)   Review of systems  Patient is POD0 s/p CABG, on IV insulin, no hypoglycemic episodes.    Labs and Fingersticks  CAPILLARY BLOOD GLUCOSE      POCT Blood Glucose.: 140 mg/dL (17 Feb 2021 14:02)  POCT Blood Glucose.: 135 mg/dL (16 Feb 2021 21:31)  POCT Blood Glucose.: 76 mg/dL (16 Feb 2021 17:00)    Medications  MEDICATIONS  (STANDING):  aspirin enteric coated 81 milliGRAM(s) Oral daily  aspirin Suppository 300 milliGRAM(s) Rectal once  cefuroxime  IVPB 1500 milliGRAM(s) IV Intermittent every 8 hours  chlorhexidine 0.12% Liquid 15 milliLiter(s) Oral Mucosa every 12 hours  chlorhexidine 2% Cloths 1 Application(s) Topical <User Schedule>  clopidogrel Tablet 75 milliGRAM(s) Oral daily  dexMEDEtomidine Infusion 0.3 MICROgram(s)/kG/Hr (5.61 mL/Hr) IV Continuous <Continuous>  dextrose 40% Gel 15 Gram(s) Oral once  dextrose 50% Injectable 25 Gram(s) IV Push once  dextrose 50% Injectable 12.5 Gram(s) IV Push once  dextrose 50% Injectable 25 Gram(s) IV Push once  glucagon  Injectable 1 milliGRAM(s) IntraMuscular once  insulin regular Infusion 3 Unit(s)/Hr (3 mL/Hr) IV Continuous <Continuous>  pantoprazole  Injectable 40 milliGRAM(s) IV Push daily  polyethylene glycol 3350 17 Gram(s) Oral daily  potassium chloride  10 mEq/50 mL IVPB 10 milliEquivalent(s) IV Intermittent every 1 hour  potassium chloride  10 mEq/50 mL IVPB 10 milliEquivalent(s) IV Intermittent every 1 hour  potassium chloride  10 mEq/50 mL IVPB 10 milliEquivalent(s) IV Intermittent every 1 hour  sodium chloride 0.9%. 1000 milliLiter(s) (10 mL/Hr) IV Continuous <Continuous>      Physical Exam  General: Patient comfortable in bed  Vital Signs Last 12 Hrs  T(F): 98.4 (02-17-21 @ 16:00), Max: 98.4 (02-17-21 @ 04:50)  HR: 86 (02-17-21 @ 16:00) (70 - 98)  BP: 137/69 (02-17-21 @ 07:16) (137/69 - 137/69)  BP(mean): --  RR: 16 (02-17-21 @ 16:00) (9 - 35)  SpO2: 100% (02-17-21 @ 16:00) (99% - 100%)

## 2021-02-17 NOTE — PROGRESS NOTE ADULT - SUBJECTIVE AND OBJECTIVE BOX
Patient seen and examined at the bedside.      Remained critically ill on continuous ICU monitoring.    OBJECTIVE:  ICU Vital Signs Last 24 Hrs  T(C): 36.9 (17 Feb 2021 16:00), Max: 36.9 (17 Feb 2021 04:50)  T(F): 98.4 (17 Feb 2021 16:00), Max: 98.4 (17 Feb 2021 04:50)  HR: 84 (17 Feb 2021 17:00) (62 - 98)  BP: 137/69 (17 Feb 2021 07:16) (125/60 - 137/69)  BP(mean): --  ABP: 127/64 (17 Feb 2021 17:00) (98/54 - 148/72)  ABP(mean): 86 (17 Feb 2021 17:00) (68 - 97)  RR: 16 (17 Feb 2021 17:00) (9 - 35)  SpO2: 100% (17 Feb 2021 17:00) (99% - 100%)    Mode: AC/ CMV (Assist Control/ Continuous Mandatory Ventilation), RR (machine): 16, TV (machine): 400, FiO2: 40, PEEP: 5, ITime: 1, MAP: 9.2, PIP: 20    02-16 @ 07:01  -  02-17 @ 07:00  --------------------------------------------------------  IN: 950.5 mL / OUT: 650 mL / NET: 300.5 mL    02-17 @ 07:01  -  02-17 @ 17:43  --------------------------------------------------------  IN: 968.5 mL / OUT: 655 mL / NET: 313.5 mL      CAPILLARY BLOOD GLUCOSE      POCT Blood Glucose.: 140 mg/dL (17 Feb 2021 14:02)        Review of Systems:  GENERAL:  no weakness, fatigue, fevers or chills  NEURO: no dizziness, numbness, tingling or weakness  SKIN: no itching, burning, rashes, or lesions   HEENT: no visual changes;  no headache, no vertigo, no recent colds  RESPIRATORY: no shortness of breath, no cough, sputum, wheezing  CARDIOVASCULAR:  no chest pain,  or palpitations  GI: no abd pain. no N/V/D.  PERIPHERAL VASCULAR: no swelling, no tenderness, no erythema      PHYSICAL EXAM:  Daily Height in cm: 151 (17 Feb 2021 07:16)    Daily   General: NAD, sedated, intubated  Neurology: Sedated  Eyes: PERRLA  ENT/Neck: Neck supple, trachea midline, No JVD, Gross hearing intact  Respiratory: + ETT midline. No wheezing, rhonchi. Rales noted bilaterally.  CV: RRR, S1S2, no murmurs, rubs or gallops  Abdominal: Soft, NT, ND +BS,   Extremities: 1-2+ pedal edema noted, + peripheral pulses  Skin: No Rashes, Hematoma, Ecchymosis      LINES:  [x] Arterial Line   [x] Central Line  [ ] PA catheter  [ ] IABP  [ ] ECMO  [ ] LVAD  [x] Ventilator  [x] TPM [ ] Impella    Beside evaluation, monitoring, treatment of hemodynamics, fluids, IVP/IVCD meds,         Ventilator (x)  Mode: AC/ CMV (Assist Control/ Continuous Mandatory Ventilation)  RR (machine): 16  TV (machine): 400  FiO2: 40  PEEP: 5  ITime: 1  MAP: 9.2  PIP: 20              Hemodynamic lability, instability. Requires IVCD [ ] vasopressors [ ] inotropes  [ ] vasodilator  [x]IVSS fluid  to maintain MAP, perfusion, C.I.     Ventilator Management:  [x]AC-rest    [ ]CPAP-PS Wean    [ ]Trach Collar     [ ]Extubate    [ ] T-Piece  [ ]peep>5     ALL MEDICATIONS:  MEDICATIONS  (STANDING):  aspirin enteric coated 81 milliGRAM(s) Oral daily  aspirin Suppository 300 milliGRAM(s) Rectal once  cefuroxime  IVPB 1500 milliGRAM(s) IV Intermittent every 8 hours  chlorhexidine 0.12% Liquid 15 milliLiter(s) Oral Mucosa every 12 hours  chlorhexidine 2% Cloths 1 Application(s) Topical <User Schedule>  clopidogrel Tablet 75 milliGRAM(s) Oral daily  dexMEDEtomidine Infusion 0.3 MICROgram(s)/kG/Hr (5.61 mL/Hr) IV Continuous <Continuous>  dextrose 40% Gel 15 Gram(s) Oral once  dextrose 50% Injectable 25 Gram(s) IV Push once  dextrose 50% Injectable 12.5 Gram(s) IV Push once  dextrose 50% Injectable 25 Gram(s) IV Push once  fentaNYL    Injectable 50 MICROGram(s) IV Push once  glucagon  Injectable 1 milliGRAM(s) IntraMuscular once  insulin regular Infusion 3 Unit(s)/Hr (3 mL/Hr) IV Continuous <Continuous>  pantoprazole  Injectable 40 milliGRAM(s) IV Push daily  polyethylene glycol 3350 17 Gram(s) Oral daily  potassium chloride  10 mEq/50 mL IVPB 10 milliEquivalent(s) IV Intermittent every 1 hour  potassium chloride  10 mEq/50 mL IVPB 10 milliEquivalent(s) IV Intermittent every 1 hour  potassium chloride  10 mEq/50 mL IVPB 10 milliEquivalent(s) IV Intermittent every 1 hour  sodium chloride 0.9%. 1000 milliLiter(s) (10 mL/Hr) IV Continuous <Continuous>    MEDICATIONS  (PRN):  acetaminophen   Tablet .. 650 milliGRAM(s) Oral every 6 hours PRN Mild Pain (1 - 3)  oxyCODONE    IR 5 milliGRAM(s) Oral every 4 hours PRN Moderate Pain (4 - 6)      LABS:                        12.4   13.32 )-----------( 122      ( 17 Feb 2021 12:16 )             37.2     02-17    141  |  106  |  16  ----------------------------<  159<H>  5.6<H>   |  22  |  0.56    Ca    7.8<L>      17 Feb 2021 12:16  Phos  2.0     02-17  Mg     2.7     02-17    TPro  5.4<L>  /  Alb  3.3  /  TBili  0.9  /  DBili  x   /  AST  40  /  ALT  22  /  AlkPhos  34<L>  02-17    PT/INR - ( 17 Feb 2021 12:16 )   PT: 16.8 sec;   INR: 1.42 ratio         PTT - ( 17 Feb 2021 12:16 )  PTT:31.3 sec    Arterial Blood Gas:  02-17 @ 17:12  7.40/36/106/22/98/-2.1  ABG lactate: --  Arterial Blood Gas:  02-17 @ 16:02  7.36/40/121/22/99/-2.3  ABG lactate: --  Arterial Blood Gas:  02-17 @ 14:50  7.33/46/108/24/98/-1.5  ABG lactate: --  Arterial Blood Gas:  02-17 @ 13:13  7.29/50/142/23/98/-3.4  ABG lactate: --  Arterial Blood Gas:  02-17 @ 12:18  7.39/39/178/23/99/-.9  ABG lactate: --  Arterial Blood Gas:  02-17 @ 11:07  7.42/38/420/24/100/.1  ABG lactate: --  Arterial Blood Gas:  02-17 @ 10:44  7.47/32/552/23/99/-.2  ABG lactate: --  Arterial Blood Gas:  02-17 @ 10:18  7.47/34/595/24/100/1.2  ABG lactate: --  Arterial Blood Gas:  02-17 @ 09:47  7.41/32/584/20/99/-4.1  ABG lactate: --  Arterial Blood Gas:  02-17 @ 07:44  7.39/37/380/22/99/-2.6  ABG lactate: --    Venous Blood Gas:  02-17 @ 10:45  7.42/38/53/24/89  VBG Lactate: 1.0  Venous Blood Gas:  02-17 @ 10:19  7.42/40/73/26/95  VBG Lactate: 1.0  Venous Blood Gas:  02-17 @ 09:48  7.35/39/59/21/89  VBG Lactate: 0.8    CARDIAC MARKERS ( 17 Feb 2021 12:16 )  x     / x     / 169 U/L / x     / 12.9 ng/mL    LIVER FUNCTIONS - ( 17 Feb 2021 12:16 )  Alb: 3.3 g/dL / Pro: 5.4 g/dL / ALK PHOS: 34 U/L / ALT: 22 U/L / AST: 40 U/L / GGT: x             RADIOLOGY:  CXR 2/17/2021: Cardiomegaly.  Mild pulmonary vascular congestion. Platelike atelectasis left upper lobe. A central line seen on the right and the tip is in superior vena cava. A mediastinal tube is in place. Endotracheal tube and NG tube are in good position. A left chest tube is in place. No pneumothorax.    Assessment:  70y F pt with PMHx of multivessel CAD, DMT2, Hypothyroidism.    S/P C3L on 2/17, POD #0  Acute Blood Loss Anemia S/P 2u pRBC transfused intraoperatively  Thrombocytopenia  Hypovolemia  Leukocytosis    Plan:   ***Neuro***  Sedated with IV Precedex and Fentanyl drips for ventilator synchrony.   Assess neuro status per protocol when pt is off sedation.     ***Cardiovascular***  S/P C3L on 2/17, POD #0  Monitor hemodynamics, assess perfusion indices  Continue DAPT: ASA and Plavix for graft patency  Hematocrit 41%  Continuous telemetry monitoring    ***Pulmonary***  On full vent support, settings as below:  RR (machine): 16  TV (machine): 400  FiO2: 40  PEEP: 5  Continue to monitor RR, breathing pattern, pulse ox, and ABG's.     ***Hematology***  Acute Blood Loss Anemia, H/H now 13.3/41  S/P 2u pRBC transfused intraoperatively  Thrombocytopenia, PLT 122k  Monitor H/H, PLT levels closely  Monitor chest tube outputs    ***GI***  NPO after recent procedure, advance diet as tolerated.   Continue Protonix for stress ulcer prophylaxis.   Bowel regimen with Miralax.     ***Renal***  Continue to monitor I/Os, BUN/Creatinine, and urine output.   Replete lytes PRN. Keep K> 4 and Mg >2.     ***ID***  Afebrile. WBC 13.32.  Continue Cefuroxime for perioperative antibiotic coverage.  Monitor WBC and trend temperature    ***Endocrine***  History of Type 2 Diabetes Mellitus, requiring glucose control with insulin gtt, titrate as per insulin drip protocol along with hourly glucose checks.       I have spent 30 minutes providing critical care management to this patient.    By signing my name below, Betty SHEA, attest that this documentation has been prepared under the direction and in the presence of Estuardo Trammell MD   Electronically signed: Toni Mccullough, 02-17-21 @ 17:44    Estuardo SHEA, personally performed the services described in this documentation. all medical record entries made by the scribe were at my direction and in my presence. I have reviewed the chart and agree that the record reflects my personal performance and is accurate and complete  Electronically signed: Estuardo Trammell MD  Patient seen and examined at the bedside.      Remained critically ill on continuous ICU monitoring.    OBJECTIVE:  ICU Vital Signs Last 24 Hrs  T(C): 36.9 (17 Feb 2021 16:00), Max: 36.9 (17 Feb 2021 04:50)  T(F): 98.4 (17 Feb 2021 16:00), Max: 98.4 (17 Feb 2021 04:50)  HR: 84 (17 Feb 2021 17:00) (62 - 98)  BP: 137/69 (17 Feb 2021 07:16) (125/60 - 137/69)  BP(mean): --  ABP: 127/64 (17 Feb 2021 17:00) (98/54 - 148/72)  ABP(mean): 86 (17 Feb 2021 17:00) (68 - 97)  RR: 16 (17 Feb 2021 17:00) (9 - 35)  SpO2: 100% (17 Feb 2021 17:00) (99% - 100%)    Mode: AC/ CMV (Assist Control/ Continuous Mandatory Ventilation), RR (machine): 16, TV (machine): 400, FiO2: 40, PEEP: 5, ITime: 1, MAP: 9.2, PIP: 20    02-16 @ 07:01  -  02-17 @ 07:00  --------------------------------------------------------  IN: 950.5 mL / OUT: 650 mL / NET: 300.5 mL    02-17 @ 07:01  -  02-17 @ 17:43  --------------------------------------------------------  IN: 968.5 mL / OUT: 655 mL / NET: 313.5 mL      CAPILLARY BLOOD GLUCOSE      POCT Blood Glucose.: 140 mg/dL (17 Feb 2021 14:02)        Review of Systems:  N/A       PHYSICAL EXAM:  Daily Height in cm: 151 (17 Feb 2021 07:16)       General: NAD, sedated, intubated  Neurology: Sedated  Eyes: PERRLA  ENT/Neck: Neck supple, + ET trachea midline, No JVD,  Respiratory: + ETT midline. No wheezing, rhonchi. Rales noted bilaterally.                     MC X1, L Pleural CT   CV: RRR, S1S2, no murmurs, rubs or gallops  Abdominal: Soft, NT, ND +BS,   Extremities: 1-2+ pedal edema noted, + peripheral pulses  Skin: No Rashes, Hematoma, Ecchymosis      LINES:  [x] Arterial Line   [x] Central Line  [ ] PA catheter  [ ] IABP  [ ] ECMO  [ ] LVAD  [x] Ventilator  [x] TPM [ ] Impella    Beside evaluation, monitoring, treatment of hemodynamics, fluids, IVP/IVCD meds,         Ventilator (x)  Mode: AC/ CMV (Assist Control/ Continuous Mandatory Ventilation)  RR (machine): 16  TV (machine): 400  FiO2: 40  PEEP: 5  ITime: 1  MAP: 9.2  PIP: 20              Hemodynamic lability, instability. Requires IVCD [ ] vasopressors [ ] inotropes  [ ] vasodilator  [x]IVSS fluid  to maintain MAP, perfusion, C.I.     Ventilator Management:  [x]AC-rest    [ ]CPAP-PS Wean    [ ]Trach Collar     [ ]Extubate    [ ] T-Piece  [ ]peep>5     ALL MEDICATIONS:  MEDICATIONS  (STANDING):  aspirin enteric coated 81 milliGRAM(s) Oral daily  aspirin Suppository 300 milliGRAM(s) Rectal once  cefuroxime  IVPB 1500 milliGRAM(s) IV Intermittent every 8 hours  chlorhexidine 0.12% Liquid 15 milliLiter(s) Oral Mucosa every 12 hours  chlorhexidine 2% Cloths 1 Application(s) Topical <User Schedule>  clopidogrel Tablet 75 milliGRAM(s) Oral daily  dexMEDEtomidine Infusion 0.3 MICROgram(s)/kG/Hr (5.61 mL/Hr) IV Continuous <Continuous>  dextrose 40% Gel 15 Gram(s) Oral once  dextrose 50% Injectable 25 Gram(s) IV Push once  dextrose 50% Injectable 12.5 Gram(s) IV Push once  dextrose 50% Injectable 25 Gram(s) IV Push once  fentaNYL    Injectable 50 MICROGram(s) IV Push once  glucagon  Injectable 1 milliGRAM(s) IntraMuscular once  insulin regular Infusion 3 Unit(s)/Hr (3 mL/Hr) IV Continuous <Continuous>  pantoprazole  Injectable 40 milliGRAM(s) IV Push daily  polyethylene glycol 3350 17 Gram(s) Oral daily  potassium chloride  10 mEq/50 mL IVPB 10 milliEquivalent(s) IV Intermittent every 1 hour  potassium chloride  10 mEq/50 mL IVPB 10 milliEquivalent(s) IV Intermittent every 1 hour  potassium chloride  10 mEq/50 mL IVPB 10 milliEquivalent(s) IV Intermittent every 1 hour  sodium chloride 0.9%. 1000 milliLiter(s) (10 mL/Hr) IV Continuous <Continuous>    MEDICATIONS  (PRN):  acetaminophen   Tablet .. 650 milliGRAM(s) Oral every 6 hours PRN Mild Pain (1 - 3)  oxyCODONE    IR 5 milliGRAM(s) Oral every 4 hours PRN Moderate Pain (4 - 6)      LABS:                        12.4   13.32 )-----------( 122      ( 17 Feb 2021 12:16 )             37.2     02-17    141  |  106  |  16  ----------------------------<  159<H>  5.6<H>   |  22  |  0.56    Ca    7.8<L>      17 Feb 2021 12:16  Phos  2.0     02-17  Mg     2.7     02-17    TPro  5.4<L>  /  Alb  3.3  /  TBili  0.9  /  DBili  x   /  AST  40  /  ALT  22  /  AlkPhos  34<L>  02-17    PT/INR - ( 17 Feb 2021 12:16 )   PT: 16.8 sec;   INR: 1.42 ratio         PTT - ( 17 Feb 2021 12:16 )  PTT:31.3 sec    Arterial Blood Gas:  02-17 @ 17:12  7.40/36/106/22/98/-2.1  ABG lactate: --  Arterial Blood Gas:  02-17 @ 16:02  7.36/40/121/22/99/-2.3  ABG lactate: --  Arterial Blood Gas:  02-17 @ 14:50  7.33/46/108/24/98/-1.5  ABG lactate: --  Arterial Blood Gas:  02-17 @ 13:13  7.29/50/142/23/98/-3.4  ABG lactate: --  Arterial Blood Gas:  02-17 @ 12:18  7.39/39/178/23/99/-.9  ABG lactate: --  Arterial Blood Gas:  02-17 @ 11:07  7.42/38/420/24/100/.1  ABG lactate: --  Arterial Blood Gas:  02-17 @ 10:44  7.47/32/552/23/99/-.2  ABG lactate: --  Arterial Blood Gas:  02-17 @ 10:18  7.47/34/595/24/100/1.2  ABG lactate: --  Arterial Blood Gas:  02-17 @ 09:47  7.41/32/584/20/99/-4.1  ABG lactate: --  Arterial Blood Gas:  02-17 @ 07:44  7.39/37/380/22/99/-2.6  ABG lactate: --    Venous Blood Gas:  02-17 @ 10:45  7.42/38/53/24/89  VBG Lactate: 1.0  Venous Blood Gas:  02-17 @ 10:19  7.42/40/73/26/95  VBG Lactate: 1.0  Venous Blood Gas:  02-17 @ 09:48  7.35/39/59/21/89  VBG Lactate: 0.8    CARDIAC MARKERS ( 17 Feb 2021 12:16 )  x     / x     / 169 U/L / x     / 12.9 ng/mL    LIVER FUNCTIONS - ( 17 Feb 2021 12:16 )  Alb: 3.3 g/dL / Pro: 5.4 g/dL / ALK PHOS: 34 U/L / ALT: 22 U/L / AST: 40 U/L / GGT: x             RADIOLOGY:  CXR 2/17/2021: Cardiomegaly.  Mild pulmonary vascular congestion. Platelike atelectasis left upper lobe. A central line seen on the right and the tip is in superior vena cava. A mediastinal tube is in place. Endotracheal tube and NG tube are in good position. A left chest tube is in place. No pneumothorax.    Assessment:  70y F pt with PMHx of HTN, HLD, multivessel CAD, uncontrolled DMT2 AIC 9.2 , Hypothyroidism.    S/P C3L, POD #0  Acute Blood Loss Anemia S/P 2u PRBC transfused intraoperatively  Thrombocytopenia  Hypovolemia  Hyperglycemia  Hypophosphatemia    Plan:   ***Neuro***  Sedated with Precedex drips for ventilator synchrony.   Assess neuro status per protocol when pt is off sedation.     ***Cardiovascular***  S/P C3L on 2/17, POD #0  Postop hypotension, hypovolemia  Low CVP/ Hypotension  S/P volume loading off Levophed    SR/ Back up pacing in place monitor for any arrthymia  Monitor hemodynamics,  perfusion indices   DAPT: ASA and Plavix for graft patency  Hematocrit 41%      ***Pulmonary***  On full vent support, settings as below:  RR (machine): 16  TV (machine): 400  FiO2: 40  PEEP: 5  Continue to monitor RR, breathing pattern, pulse ox, and ABG's.   Wean to extubate once hemodynamically stable  Post extubation lung expansion maneuvers   Pain control     ***Hematology***  Acute Blood Loss Anemia,   S/P 2u PRBC transfused intraoperatively  Thrombocytopenia, PLT 122k  Monitor H/H, PLT levels closely  Monitor chest tube outputs    ***GI***  NPO after recent procedure, advance diet as tolerated.   Continue Protonix for stress ulcer prophylaxis.   Bowel regimen with Miralax.     ***Renal***  Continue to monitor I/Os, BUN/Creatinine, and urine output.   Replete lytes PRN. Keep K> 4 and Mg >2.     ***ID***  Afebrile. WBC 13.32.  Continue Cefuroxime for perioperative antibiotic coverage.  Monitor WBC and trend temperature    ***Endocrine***  History of Type 2 Diabetes Mellitus, requiring glucose control with insulin gtt, titrate as per insulin drip protocol along with hourly glucose checks.   Resume synthroid TSH 6.26     I have spent 30 minutes providing critical care management to this patient.    By signing my name below, I, Betty Glynn, attest that this documentation has been prepared under the direction and in the presence of Estuardo Trammell MD   Electronically signed: Toni Mccullough, 02-17-21 @ 17:44    I, Estuardo Trammell, personally performed the services described in this documentation. all medical record entries made by the scribe were at my direction and in my presence. I have reviewed the chart and agree that the record reflects my personal performance and is accurate and complete  Electronically signed: Estuardo Trammell MD

## 2021-02-17 NOTE — PRE-ANESTHESIA EVALUATION ADULT - RESPIRATORY RATE (BREATHS/MIN)
18 Bleeding that does not stop/Swelling that gets worse/Numbness, tingling, color or temperature change to extremity

## 2021-02-17 NOTE — BRIEF OPERATIVE NOTE - NSICDXBRIEFPROCEDURE_GEN_ALL_CORE_FT
PROCEDURES:  CABG, with JUAN CARLOS 17-Feb-2021 12:13:52 LIMA-LAD, SVG-RCA, SVG-Circ Betty Butterfield

## 2021-02-17 NOTE — PROGRESS NOTE ADULT - PROBLEM SELECTOR PLAN 1
Suggest to continue IV insulin for now.  Encourage close monitoring of FS and adjusting insulin dose per ICU protocol to keep blood sugars within target range.  Will continue monitoring FS, log, and FU. Will transition to basal bolus insulin once blood sugars are stable and patient is eating full meals.

## 2021-02-18 DIAGNOSIS — Z95.1 PRESENCE OF AORTOCORONARY BYPASS GRAFT: ICD-10-CM

## 2021-02-18 LAB
ALBUMIN SERPL ELPH-MCNC: 3.6 G/DL — SIGNIFICANT CHANGE UP (ref 3.3–5)
ALP SERPL-CCNC: 45 U/L — SIGNIFICANT CHANGE UP (ref 40–120)
ALT FLD-CCNC: 27 U/L — SIGNIFICANT CHANGE UP (ref 10–45)
ANION GAP SERPL CALC-SCNC: 10 MMOL/L — SIGNIFICANT CHANGE UP (ref 5–17)
AST SERPL-CCNC: 53 U/L — HIGH (ref 10–40)
BILIRUB SERPL-MCNC: 0.3 MG/DL — SIGNIFICANT CHANGE UP (ref 0.2–1.2)
BUN SERPL-MCNC: 15 MG/DL — SIGNIFICANT CHANGE UP (ref 7–23)
CALCIUM SERPL-MCNC: 8.5 MG/DL — SIGNIFICANT CHANGE UP (ref 8.4–10.5)
CHLORIDE SERPL-SCNC: 109 MMOL/L — HIGH (ref 96–108)
CO2 SERPL-SCNC: 21 MMOL/L — LOW (ref 22–31)
CREAT SERPL-MCNC: 0.64 MG/DL — SIGNIFICANT CHANGE UP (ref 0.5–1.3)
GAS PNL BLDA: SIGNIFICANT CHANGE UP
GAS PNL BLDA: SIGNIFICANT CHANGE UP
GLUCOSE BLDC GLUCOMTR-MCNC: 109 MG/DL — HIGH (ref 70–99)
GLUCOSE BLDC GLUCOMTR-MCNC: 112 MG/DL — HIGH (ref 70–99)
GLUCOSE BLDC GLUCOMTR-MCNC: 119 MG/DL — HIGH (ref 70–99)
GLUCOSE BLDC GLUCOMTR-MCNC: 127 MG/DL — HIGH (ref 70–99)
GLUCOSE BLDC GLUCOMTR-MCNC: 128 MG/DL — HIGH (ref 70–99)
GLUCOSE BLDC GLUCOMTR-MCNC: 138 MG/DL — HIGH (ref 70–99)
GLUCOSE BLDC GLUCOMTR-MCNC: 140 MG/DL — HIGH (ref 70–99)
GLUCOSE BLDC GLUCOMTR-MCNC: 143 MG/DL — HIGH (ref 70–99)
GLUCOSE BLDC GLUCOMTR-MCNC: 163 MG/DL — HIGH (ref 70–99)
GLUCOSE BLDC GLUCOMTR-MCNC: 165 MG/DL — HIGH (ref 70–99)
GLUCOSE BLDC GLUCOMTR-MCNC: 166 MG/DL — HIGH (ref 70–99)
GLUCOSE BLDC GLUCOMTR-MCNC: 186 MG/DL — HIGH (ref 70–99)
GLUCOSE BLDC GLUCOMTR-MCNC: 203 MG/DL — HIGH (ref 70–99)
GLUCOSE BLDC GLUCOMTR-MCNC: 244 MG/DL — HIGH (ref 70–99)
GLUCOSE BLDC GLUCOMTR-MCNC: 257 MG/DL — HIGH (ref 70–99)
GLUCOSE BLDC GLUCOMTR-MCNC: 74 MG/DL — SIGNIFICANT CHANGE UP (ref 70–99)
GLUCOSE BLDC GLUCOMTR-MCNC: 76 MG/DL — SIGNIFICANT CHANGE UP (ref 70–99)
GLUCOSE BLDC GLUCOMTR-MCNC: 79 MG/DL — SIGNIFICANT CHANGE UP (ref 70–99)
GLUCOSE BLDC GLUCOMTR-MCNC: 80 MG/DL — SIGNIFICANT CHANGE UP (ref 70–99)
GLUCOSE BLDC GLUCOMTR-MCNC: 89 MG/DL — SIGNIFICANT CHANGE UP (ref 70–99)
GLUCOSE BLDC GLUCOMTR-MCNC: 99 MG/DL — SIGNIFICANT CHANGE UP (ref 70–99)
GLUCOSE SERPL-MCNC: 106 MG/DL — HIGH (ref 70–99)
HCT VFR BLD CALC: 37.8 % — SIGNIFICANT CHANGE UP (ref 34.5–45)
HGB BLD-MCNC: 12.3 G/DL — SIGNIFICANT CHANGE UP (ref 11.5–15.5)
MAGNESIUM SERPL-MCNC: 2.2 MG/DL — SIGNIFICANT CHANGE UP (ref 1.6–2.6)
MCHC RBC-ENTMCNC: 29.4 PG — SIGNIFICANT CHANGE UP (ref 27–34)
MCHC RBC-ENTMCNC: 32.5 GM/DL — SIGNIFICANT CHANGE UP (ref 32–36)
MCV RBC AUTO: 90.2 FL — SIGNIFICANT CHANGE UP (ref 80–100)
NRBC # BLD: 0 /100 WBCS — SIGNIFICANT CHANGE UP (ref 0–0)
PHOSPHATE SERPL-MCNC: 3.7 MG/DL — SIGNIFICANT CHANGE UP (ref 2.5–4.5)
PLATELET # BLD AUTO: 155 K/UL — SIGNIFICANT CHANGE UP (ref 150–400)
POTASSIUM SERPL-MCNC: 4.2 MMOL/L — SIGNIFICANT CHANGE UP (ref 3.5–5.3)
POTASSIUM SERPL-SCNC: 4.2 MMOL/L — SIGNIFICANT CHANGE UP (ref 3.5–5.3)
PROT SERPL-MCNC: 5.9 G/DL — LOW (ref 6–8.3)
RBC # BLD: 4.19 M/UL — SIGNIFICANT CHANGE UP (ref 3.8–5.2)
RBC # FLD: 14 % — SIGNIFICANT CHANGE UP (ref 10.3–14.5)
SARS-COV-2 RNA SPEC QL NAA+PROBE: SIGNIFICANT CHANGE UP
SODIUM SERPL-SCNC: 140 MMOL/L — SIGNIFICANT CHANGE UP (ref 135–145)
WBC # BLD: 19.71 K/UL — HIGH (ref 3.8–10.5)
WBC # FLD AUTO: 19.71 K/UL — HIGH (ref 3.8–10.5)

## 2021-02-18 PROCEDURE — 93010 ELECTROCARDIOGRAM REPORT: CPT

## 2021-02-18 PROCEDURE — 99291 CRITICAL CARE FIRST HOUR: CPT

## 2021-02-18 PROCEDURE — 71045 X-RAY EXAM CHEST 1 VIEW: CPT | Mod: 26

## 2021-02-18 RX ORDER — METOPROLOL TARTRATE 50 MG
25 TABLET ORAL
Refills: 0 | Status: DISCONTINUED | OUTPATIENT
Start: 2021-02-18 | End: 2021-02-19

## 2021-02-18 RX ORDER — METOPROLOL TARTRATE 50 MG
2.5 TABLET ORAL ONCE
Refills: 0 | Status: COMPLETED | OUTPATIENT
Start: 2021-02-18 | End: 2021-02-18

## 2021-02-18 RX ORDER — INSULIN GLARGINE 100 [IU]/ML
10 INJECTION, SOLUTION SUBCUTANEOUS AT BEDTIME
Refills: 0 | Status: DISCONTINUED | OUTPATIENT
Start: 2021-02-18 | End: 2021-02-19

## 2021-02-18 RX ORDER — OXYCODONE HYDROCHLORIDE 5 MG/1
10 TABLET ORAL EVERY 4 HOURS
Refills: 0 | Status: DISCONTINUED | OUTPATIENT
Start: 2021-02-18 | End: 2021-02-23

## 2021-02-18 RX ORDER — SENNA PLUS 8.6 MG/1
2 TABLET ORAL AT BEDTIME
Refills: 0 | Status: DISCONTINUED | OUTPATIENT
Start: 2021-02-18 | End: 2021-02-23

## 2021-02-18 RX ORDER — ENOXAPARIN SODIUM 100 MG/ML
40 INJECTION SUBCUTANEOUS DAILY
Refills: 0 | Status: DISCONTINUED | OUTPATIENT
Start: 2021-02-19 | End: 2021-02-23

## 2021-02-18 RX ORDER — INSULIN LISPRO 100/ML
5 VIAL (ML) SUBCUTANEOUS
Refills: 0 | Status: DISCONTINUED | OUTPATIENT
Start: 2021-02-18 | End: 2021-02-19

## 2021-02-18 RX ORDER — INSULIN LISPRO 100/ML
VIAL (ML) SUBCUTANEOUS
Refills: 0 | Status: DISCONTINUED | OUTPATIENT
Start: 2021-02-18 | End: 2021-02-23

## 2021-02-18 RX ORDER — INSULIN LISPRO 100/ML
VIAL (ML) SUBCUTANEOUS AT BEDTIME
Refills: 0 | Status: DISCONTINUED | OUTPATIENT
Start: 2021-02-19 | End: 2021-02-23

## 2021-02-18 RX ORDER — METOCLOPRAMIDE HCL 10 MG
10 TABLET ORAL EVERY 8 HOURS
Refills: 0 | Status: COMPLETED | OUTPATIENT
Start: 2021-02-18 | End: 2021-02-19

## 2021-02-18 RX ADMIN — Medication 81 MILLIGRAM(S): at 10:48

## 2021-02-18 RX ADMIN — Medication 100 MILLIGRAM(S): at 07:47

## 2021-02-18 RX ADMIN — Medication 10 MILLIGRAM(S): at 21:52

## 2021-02-18 RX ADMIN — CHLORHEXIDINE GLUCONATE 1 APPLICATION(S): 213 SOLUTION TOPICAL at 05:17

## 2021-02-18 RX ADMIN — Medication 100 MILLIGRAM(S): at 16:30

## 2021-02-18 RX ADMIN — Medication 2.5 MILLIGRAM(S): at 01:26

## 2021-02-18 RX ADMIN — Medication 650 MILLIGRAM(S): at 07:48

## 2021-02-18 RX ADMIN — OXYCODONE HYDROCHLORIDE 5 MILLIGRAM(S): 5 TABLET ORAL at 03:52

## 2021-02-18 RX ADMIN — Medication 100 MICROGRAM(S): at 05:31

## 2021-02-18 RX ADMIN — INSULIN GLARGINE 10 UNIT(S): 100 INJECTION, SOLUTION SUBCUTANEOUS at 23:27

## 2021-02-18 RX ADMIN — Medication 10 MILLIGRAM(S): at 13:44

## 2021-02-18 RX ADMIN — CLOPIDOGREL BISULFATE 75 MILLIGRAM(S): 75 TABLET, FILM COATED ORAL at 10:48

## 2021-02-18 RX ADMIN — OXYCODONE HYDROCHLORIDE 10 MILLIGRAM(S): 5 TABLET ORAL at 17:30

## 2021-02-18 RX ADMIN — OXYCODONE HYDROCHLORIDE 5 MILLIGRAM(S): 5 TABLET ORAL at 14:15

## 2021-02-18 RX ADMIN — Medication 10 MILLIGRAM(S): at 05:31

## 2021-02-18 RX ADMIN — Medication 25 MILLIGRAM(S): at 17:30

## 2021-02-18 RX ADMIN — DORZOLAMIDE HYDROCHLORIDE 1 DROP(S): 20 SOLUTION/ DROPS OPHTHALMIC at 11:15

## 2021-02-18 RX ADMIN — PANTOPRAZOLE SODIUM 40 MILLIGRAM(S): 20 TABLET, DELAYED RELEASE ORAL at 05:31

## 2021-02-18 RX ADMIN — Medication 25 MILLIGRAM(S): at 05:31

## 2021-02-18 RX ADMIN — ATORVASTATIN CALCIUM 40 MILLIGRAM(S): 80 TABLET, FILM COATED ORAL at 21:53

## 2021-02-18 RX ADMIN — Medication 100 MILLIGRAM(S): at 23:40

## 2021-02-18 RX ADMIN — DORZOLAMIDE HYDROCHLORIDE 1 DROP(S): 20 SOLUTION/ DROPS OPHTHALMIC at 21:53

## 2021-02-18 RX ADMIN — INSULIN HUMAN 3 UNIT(S)/HR: 100 INJECTION, SOLUTION SUBCUTANEOUS at 12:40

## 2021-02-18 RX ADMIN — OXYCODONE HYDROCHLORIDE 5 MILLIGRAM(S): 5 TABLET ORAL at 21:57

## 2021-02-18 RX ADMIN — Medication 100 MILLIGRAM(S): at 00:06

## 2021-02-18 RX ADMIN — OXYCODONE HYDROCHLORIDE 10 MILLIGRAM(S): 5 TABLET ORAL at 11:15

## 2021-02-18 NOTE — PHYSICAL THERAPY INITIAL EVALUATION ADULT - ACTIVE RANGE OF MOTION EXAMINATION, REHAB EVAL
LEWIS shoulder flexion assessed 0-90 degrees/bilateral upper extremity Active ROM was WFL (within functional limits)/bilateral  lower extremity Active ROM was WFL (within functional limits)

## 2021-02-18 NOTE — PHYSICAL THERAPY INITIAL EVALUATION ADULT - LIVES WITH, PROFILE
Pt resides with daughter in apartment./children Pt resides with daughter in apartment. Prior to admission pt independent with all functional mobility including ambulation without AD./children

## 2021-02-18 NOTE — PROGRESS NOTE ADULT - ASSESSMENT
Assessment  DMT2: 70y Female with DM T2 with hyperglycemia admitted with chest pain, A1C 9.2%, was on insulin at home, POD1 on IV insulin, blood sugars/insulin requirement fluctuating and not at target, no hypoglycemic episodes, diet advanced, transferred to [facility].  CAD: CABG 2/17, on medications, monitored, stable.  Hypothyroidism: on Synthroid 100 mcg po daily, compliant with Synthroid intake, subclinical hypothyroid, FT4 1.4.  Overweight: No strict exercise routines, neither on low calorie diet.        Flor Torres MD  Cell: [phone]  Office: [phone]               Assessment  DMT2: 70y Female with DM T2 with hyperglycemia admitted with chest pain, A1C 9.2%, was on insulin at home, POD1 on IV insulin, blood sugars/insulin  requirement fluctuating and not at target, no hypoglycemic episodes, diet advanced, transferred to [facility].  CAD: CABG 2/17, on medications, monitored, stable.  Hypothyroidism: on Synthroid 100 mcg po daily, compliant with Synthroid intake, subclinical hypothyroid, FT4 1.4.  Overweight: No strict exercise routines, neither on low calorie diet.        Flor Torres MD  Cell: [phone]  Office: [phone]

## 2021-02-18 NOTE — PHYSICAL THERAPY INITIAL EVALUATION ADULT - CRITERIA FOR SKILLED THERAPEUTIC INTERVENTIONS
impairments found/rehab potential/anticipated equipment needs at discharge/anticipated discharge recommendation

## 2021-02-18 NOTE — PROGRESS NOTE ADULT - SUBJECTIVE AND OBJECTIVE BOX
VITAL SIGNS    Telemetry:      Vital Signs Last 24 Hrs  T(C): 36.9 (21 @ 11:54), Max: 37.6 (21 @ 04:00)  T(F): 98.4 (21 @ 11:54), Max: 99.7 (21 @ 04:00)  HR: 96 (21 @ 11:54) (83 - 107)  BP: 116/67 (21 @ 11:54) (98/56 - 116/67)  RR: 18 (21 @ 11:54) (3 - 36)  SpO2: 100% (21 @ 11:54) (97% - 100%)                    @ 07:01  -   @ 07:00  --------------------------------------------------------  IN: 1601 mL / OUT: 1440 mL / NET: 161 mL     @ 07:01  -   @ 13:07  --------------------------------------------------------  IN: 643 mL / OUT: 245 mL / NET: 398 mL          Daily     Daily Weight in k.2 (2021 00:00)            CAPILLARY BLOOD GLUCOSE      POCT Blood Glucose.: 203 mg/dL (2021 12:18)  POCT Blood Glucose.: 163 mg/dL (2021 10:57)  POCT Blood Glucose.: 244 mg/dL (2021 09:47)  POCT Blood Glucose.: 257 mg/dL (2021 08:56)  POCT Blood Glucose.: 186 mg/dL (2021 08:00)  POCT Blood Glucose.: 138 mg/dL (2021 03:43)  POCT Blood Glucose.: 128 mg/dL (2021 03:07)  POCT Blood Glucose.: 119 mg/dL (2021 02:38)  POCT Blood Glucose.: 89 mg/dL (2021 02:07)  POCT Blood Glucose.: 79 mg/dL (2021 01:43)  POCT Blood Glucose.: 80 mg/dL (2021 01:20)  POCT Blood Glucose.: 115 mg/dL (2021 22:47)  POCT Blood Glucose.: 127 mg/dL (2021 21:53)  POCT Blood Glucose.: 113 mg/dL (2021 19:00)  POCT Blood Glucose.: 140 mg/dL (2021 14:02)            Drains:     MS         [  ] Drainage:                 L Pleural  [  ]  Drainage:                R Pleural  [  ]  Drainage:    Pacing Wires        [  ]   Settings:                                  Isolated  [  ]    Coumadin    [ ] YES          [  ]      NO                                   PHYSICAL EXAM        Neurology: alert and oriented x 3, nonfocal, no gross deficits  CV : s1 s2 RRR  Sternal Wound :  CDI , Stable  Lungs: cta  Abdomen: soft, nontender, nondistended, positive bowel sounds, last bowel movement                       chest tubes  :    voiding / sterling - sbd         Extremities:      edema   /  -   calve tenderness ,    L leg  /  R leg  incisions cdi          acetaminophen   Tablet .. 650 milliGRAM(s) Oral every 6 hours PRN  aspirin enteric coated 81 milliGRAM(s) Oral daily  atorvastatin 40 milliGRAM(s) Oral at bedtime  cefuroxime  IVPB 1500 milliGRAM(s) IV Intermittent every 8 hours  chlorhexidine 2% Cloths 1 Application(s) Topical <User Schedule>  clopidogrel Tablet 75 milliGRAM(s) Oral daily  dextrose 40% Gel 15 Gram(s) Oral once  dextrose 50% Injectable 25 Gram(s) IV Push once  dextrose 50% Injectable 12.5 Gram(s) IV Push once  dextrose 50% Injectable 25 Gram(s) IV Push once  dorzolamide 2% Ophthalmic Solution 1 Drop(s) Both EYES <User Schedule>  glucagon  Injectable 1 milliGRAM(s) IntraMuscular once  insulin regular Infusion 3 Unit(s)/Hr IV Continuous <Continuous>  levothyroxine 100 MICROGram(s) Oral daily  metoclopramide Injectable 10 milliGRAM(s) IV Push every 8 hours  metoprolol tartrate 25 milliGRAM(s) Oral two times a day  oxyCODONE    IR 10 milliGRAM(s) Oral every 4 hours PRN  oxyCODONE    IR 5 milliGRAM(s) Oral every 4 hours PRN  pantoprazole    Tablet 40 milliGRAM(s) Oral before breakfast  polyethylene glycol 3350 17 Gram(s) Oral daily  potassium chloride  10 mEq/50 mL IVPB 10 milliEquivalent(s) IV Intermittent every 1 hour  potassium chloride  10 mEq/50 mL IVPB 10 milliEquivalent(s) IV Intermittent every 1 hour  potassium chloride  10 mEq/50 mL IVPB 10 milliEquivalent(s) IV Intermittent every 1 hour  senna 2 Tablet(s) Oral at bedtime  sodium chloride 0.9%. 1000 milliLiter(s) IV Continuous <Continuous>                    Physical Therapy Rec:   Home  [  ]   Home w/ PT  [  ]  Rehab  [  ]  Discussed with Cardiothoracic Team at AM rounds.

## 2021-02-18 NOTE — PROGRESS NOTE ADULT - PROBLEM SELECTOR PLAN 1
Suggest to continue IV insulin for now.  Will continue monitoring FS, log, and FU. Will transition to basal bolus insulin once blood sugars/insulin requirement are stable and patient is eating full meals.

## 2021-02-18 NOTE — PROGRESS NOTE ADULT - SUBJECTIVE AND OBJECTIVE BOX
SAVANNAH AQUINO  MRN-[id number]  Patient is a 70y old  Female who presents with a chief complaint of Multivessel CAD (17 Feb 2021 17:43)    HPI:  70 year old female (denies implanted devices) with significant PMHx of former 5 pack year cigarette smoker, T2DM (last HgA1c unknown, managed by endocrinologist in [place], denies complications), hypothyroidism, HTN, HLD admitted to [facility] on 2/8/2021 with NSTEMI and transferred to [facility] for LHC with Dr. Duggan. Upon admission to [facility], patient c/o chest pain that radiated to jaw and both arms that began 8 days ago. She had 4 episodes that began with mild exertion and progressively became worse and started with rest. Patient travels frequently back and forth from [place], most recent trip from [place] was 3 months ago. No troponin levels available in transfer documents. LHC revealed 80-90% stenosis to LAD, 80-90% stenosis to prox diag, 80-90% to prox Cx, 70-80 to mid/distal RCA and recommended for CTS eval for CABG.     2/11/2021 WBC 6.84, HGB 12.8, HCT 38.4, , , K 4.1, Cl 108, BUN 20, Cr 0.8, GFR >60        [place] [id number]   (11 Feb 2021 18:53)      Surgery/Hospital Course:  2/17 C3L    Today:    REVIEW OF SYSTEMS:  Gen: No fever  EYES/ENT: No visual changes;  No vertigo or throat pain   NECK: No pain   RES:  No shortness of breath or Cough  CARD: No chest pain   GI: No abdominal pain  : No dysuria  NEURO: No weakness  SKIN: No itching, rashes     ICU Vital Signs Last 24 Hrs  T(C): 37.6 (18 Feb 2021 08:00), Max: 37.6 (18 Feb 2021 04:00)  T(F): 99.7 (18 Feb 2021 08:00), Max: 99.7 (18 Feb 2021 04:00)  HR: 98 (18 Feb 2021 08:00) (76 - 107)  BP: --  BP(mean): --  ABP: 121/70 (18 Feb 2021 08:00) (98/54 - 181/71)  ABP(mean): 92 (18 Feb 2021 08:00) (5 - 102)  RR: 28 (18 Feb 2021 08:00) (3 - 36)  SpO2: 99% (18 Feb 2021 08:00) (97% - 100%)      Physical Exam:  Gen:  Awake, alert   CNS: non focal 	  Neck: no JVD  RES : clear , no wheezing              CVS: Regular  rhythm. Normal S1/S2  Chest: + chest tubes  Abd: Soft, non-distended. Bowel sounds present.  Skin: No rash.  Ext:  no edema    ============================I/O===========================   I&O's Detail    17 Feb 2021 07:01  -  18 Feb 2021 07:00  --------------------------------------------------------  IN:    Insulin: 61 mL    IV PiggyBack: 600 mL    Lactated Ringers Bolus: 750 mL    sodium chloride 0.9%: 190 mL  Total IN: 1601 mL    OUT:    Chest Tube (mL): 175 mL    Chest Tube (mL): 15 mL    Indwelling Catheter - Urethral (mL): 1250 mL    Nasogastric/Oral tube (mL): 0 mL  Total OUT: 1440 mL    Total NET: 161 mL      18 Feb 2021 07:01  -  18 Feb 2021 08:29  --------------------------------------------------------  IN:    Insulin: 5 mL    IV PiggyBack: 50 mL    Oral Fluid: 250 mL    sodium chloride 0.9%: 10 mL  Total IN: 315 mL    OUT:    Chest Tube (mL): 0 mL    Chest Tube (mL): 0 mL    Indwelling Catheter - Urethral (mL): 40 mL  Total OUT: 40 mL    Total NET: 275 mL        ============================ LABS =========================                        12.3   19.71 )-----------( 155      ( 18 Feb 2021 00:18 )             37.8     02-18    140  |  109<H>  |  15  ----------------------------<  106<H>  4.2   |  21<L>  |  0.64    Ca    8.5      18 Feb 2021 00:18  Phos  3.7     02-18  Mg     2.2     02-18    TPro  5.9<L>  /  Alb  3.6  /  TBili  0.3  /  DBili  x   /  AST  53<H>  /  ALT  27  /  AlkPhos  45  02-18    LIVER FUNCTIONS - ( 18 Feb 2021 00:18 )  Alb: 3.6 g/dL / Pro: 5.9 g/dL / ALK PHOS: 45 U/L / ALT: 27 U/L / AST: 53 U/L / GGT: x           PT/INR - ( 17 Feb 2021 12:16 )   PT: 16.8 sec;   INR: 1.42 ratio         PTT - ( 17 Feb 2021 12:16 )  PTT:31.3 sec  ABG - ( 18 Feb 2021 06:37 )  pH, Arterial: 7.36  pH, Blood: x     /  pCO2: 38    /  pO2: 157   / HCO3: 21    / Base Excess: -3.8  /  SaO2: 99          ======================Micro/Rad/Cardio=================  CXR: Reviewed  Echo:Reviewed  ======================================================  PAST MEDICAL & SURGICAL HISTORY:  HLD (hyperlipidemia)    Hypothyroidism    DM (diabetes mellitus)    HTN (hypertension)    S/P VLADISLAV (total abdominal hysterectomy)      ====================ASSESSMENT ==============  Multivessel CAD S/P C3L on 2/17  Acute Blood Loss Anemia S/P 2u PRBC transfused intraoperatively  Hypovolemia  DM type 2  Hypophosphatemia    Plan:  ====================== NEUROLOGY=====================  Continue to assess and monitor neuro status as per ICU protocol  Pain management with Tylenol and Oxycodone PRN    acetaminophen   Tablet .. 650 milliGRAM(s) Oral every 6 hours PRN Mild Pain (1 - 3)  oxyCODONE    IR 5 milliGRAM(s) Oral every 4 hours PRN Moderate Pain (4 - 6)    ==================== RESPIRATORY======================  Extubated overnight, currently on supplemental O2 via 6L nasal cannula, SpO2 99%  Continue to monitor SpO2 via pulse oximetry, follow ABGs    Mechanical Ventilation:  Mode: CPAP with PS  FiO2: 50  PEEP: 5  PS: 15      ====================CARDIOVASCULAR==================  Multivessel CAD s/p C3L on 2/17  Blood pressure support with Metoprolol  Dual antiplatelet therapy with ASA/Plavix, along with Lipitor for graft patency  Monitor hemodynamics, trend perfusion indices    aspirin enteric coated 81 milliGRAM(s) Oral daily  atorvastatin 40 milliGRAM(s) Oral at bedtime  clopidogrel Tablet 75 milliGRAM(s) Oral daily  metoprolol tartrate 25 milliGRAM(s) Oral two times a day    ===================HEMATOLOGIC/ONC ===================  S/p 2u pRBC transfused intraoperatively yesterday  Continue closely monitoring levels of hemoglobin, hematocrit and platelets    ===================== RENAL =========================  Monitor lytes, BUN/Creatinine, urine output and I/Os  Replenish electrolytes, keep K > 4 and Mg > 2    ==================== GASTROINTESTINAL===================  Tolerating regular PO diet  Reglan for intestinal motilty  Miralax for bowel regimen    metoclopramide Injectable 10 milliGRAM(s) IV Push every 8 hours  GI prophylaxis, pantoprazole    Tablet 40 milliGRAM(s) Oral before breakfast  polyethylene glycol 3350 17 Gram(s) Oral daily  potassium chloride  10 mEq/50 mL IVPB 10 milliEquivalent(s) IV Intermittent every 1 hour  potassium chloride  10 mEq/50 mL IVPB 10 milliEquivalent(s) IV Intermittent every 1 hour  potassium chloride  10 mEq/50 mL IVPB 10 milliEquivalent(s) IV Intermittent every 1 hour  sodium chloride 0.9%. 1000 milliLiter(s) (10 mL/Hr) IV Continuous <Continuous>    =======================    ENDOCRINE  =====================  Hx of type 2 diabetes mellitus, requiring glycemic control with an IV Insulin gtt  Continue to trend blood glucose levels  Hypothyroidism, c/w Synthroid    dextrose 40% Gel 15 Gram(s) Oral once  dextrose 50% Injectable 25 Gram(s) IV Push once  dextrose 50% Injectable 12.5 Gram(s) IV Push once  dextrose 50% Injectable 25 Gram(s) IV Push once  glucagon  Injectable 1 milliGRAM(s) IntraMuscular once  insulin regular Infusion 3 Unit(s)/Hr (3 mL/Hr) IV Continuous <Continuous>  levothyroxine 100 MICROGram(s) Oral daily    ========================INFECTIOUS DISEASE================  Perioperative antibiotic coverage with IV Cefuroxime  Temp 98F, Leukocytosis with WBC elevated and uptrending at 19.71   Trend fever curve & WBC    cefuroxime  IVPB 1500 milliGRAM(s) IV Intermittent every 8 hours      Patient requires continuous monitoring with bedside rhythm monitoring, pulse ox monitoring, and intermittent blood gas analysis. Care plan discussed with ICU care team. Patient remained critical and at risk for life threatening decompensation.     By signing my name below, I, Junior Pagan, attest that this documentation has been prepared under the direction and in the presence of RENO James  Electronically signed: Toni Posey, 02-18-21 @ 08:29    I, RENO James, personally performed the services described in this documentation. All medical record entries made by the sarahibe were at my direction and in my presence. I have reviewed the chart and agree that the record reflects my personal performance and is accurate and complete  Electronically signed: RENO James       SAVANNAH AQUINO  MRN-[id number]  Patient is a 70y old  Female who presents with a chief complaint of Multivessel CAD (17 Feb 2021 17:43)    HPI:  70 year old female (denies implanted devices) with significant PMHx of former 5 pack year cigarette smoker, T2DM (last HgA1c unknown, managed by endocrinologist in [place], denies complications), hypothyroidism, HTN, HLD admitted to [facility] on 2/8/2021 with NSTEMI and transferred to [facility] for LHC with Dr. Duggan. Upon admission to [facility], patient c/o chest pain that radiated to jaw and both arms that began 8 days ago. She had 4 episodes that began with mild exertion and progressively became worse and started with rest. Patient travels frequently back and forth from [place], most recent trip from [place] was 3 months ago. No troponin levels available in transfer documents. LHC revealed 80-90% stenosis to LAD, 80-90% stenosis to prox diag, 80-90% to prox Cx, 70-80 to mid/distal RCA and recommended for CTS eval for CABG.     2/11/2021 WBC 6.84, HGB 12.8, HCT 38.4, , , K 4.1, Cl 108, BUN 20, Cr 0.8, GFR >60        [place] [id number]   (11 Feb 2021 18:53)      Surgery/Hospital Course:  2/17 C3L    Today: Indonesian speaking only. No complaints. Pain well controlled. Endocrinology following, will continue insulin gtt for transfer to [facility].    REVIEW OF SYSTEMS:  Gen: No fever  EYES/ENT: No visual changes;  No vertigo or throat pain   NECK: No pain   RES:  No shortness of breath or Cough  CARD: No chest pain   GI: No abdominal pain  : No dysuria  NEURO: No weakness  SKIN: No itching, rashes     ICU Vital Signs Last 24 Hrs  T(C): 37.6 (18 Feb 2021 08:00), Max: 37.6 (18 Feb 2021 04:00)  T(F): 99.7 (18 Feb 2021 08:00), Max: 99.7 (18 Feb 2021 04:00)  HR: 98 (18 Feb 2021 08:00) (76 - 107)  BP: --  BP(mean): --  ABP: 121/70 (18 Feb 2021 08:00) (98/54 - 181/71)  ABP(mean): 92 (18 Feb 2021 08:00) (5 - 102)  RR: 28 (18 Feb 2021 08:00) (3 - 36)  SpO2: 99% (18 Feb 2021 08:00) (97% - 100%)      Physical Exam:  Gen:  Awake, alert   CNS: non focal 	  Neck: no JVD  RES : clear , no wheezing              CVS: Regular  rhythm. Normal S1/S2  Chest: + chest tubes  Abd: Soft, non-distended. Bowel sounds present.  Skin: No rash.  Ext:  no edema    ============================I/O===========================   I&O's Detail    17 Feb 2021 07:01  -  18 Feb 2021 07:00  --------------------------------------------------------  IN:    Insulin: 61 mL    IV PiggyBack: 600 mL    Lactated Ringers Bolus: 750 mL    sodium chloride 0.9%: 190 mL  Total IN: 1601 mL    OUT:    Chest Tube (mL): 175 mL    Chest Tube (mL): 15 mL    Indwelling Catheter - Urethral (mL): 1250 mL    Nasogastric/Oral tube (mL): 0 mL  Total OUT: 1440 mL    Total NET: 161 mL      18 Feb 2021 07:01  -  18 Feb 2021 08:29  --------------------------------------------------------  IN:    Insulin: 5 mL    IV PiggyBack: 50 mL    Oral Fluid: 250 mL    sodium chloride 0.9%: 10 mL  Total IN: 315 mL    OUT:    Chest Tube (mL): 0 mL    Chest Tube (mL): 0 mL    Indwelling Catheter - Urethral (mL): 40 mL  Total OUT: 40 mL    Total NET: 275 mL        ============================ LABS =========================                        12.3   19.71 )-----------( 155      ( 18 Feb 2021 00:18 )             37.8     02-18    140  |  109<H>  |  15  ----------------------------<  106<H>  4.2   |  21<L>  |  0.64    Ca    8.5      18 Feb 2021 00:18  Phos  3.7     02-18  Mg     2.2     02-18    TPro  5.9<L>  /  Alb  3.6  /  TBili  0.3  /  DBili  x   /  AST  53<H>  /  ALT  27  /  AlkPhos  45  02-18    LIVER FUNCTIONS - ( 18 Feb 2021 00:18 )  Alb: 3.6 g/dL / Pro: 5.9 g/dL / ALK PHOS: 45 U/L / ALT: 27 U/L / AST: 53 U/L / GGT: x           PT/INR - ( 17 Feb 2021 12:16 )   PT: 16.8 sec;   INR: 1.42 ratio         PTT - ( 17 Feb 2021 12:16 )  PTT:31.3 sec  ABG - ( 18 Feb 2021 06:37 )  pH, Arterial: 7.36  pH, Blood: x     /  pCO2: 38    /  pO2: 157   / HCO3: 21    / Base Excess: -3.8  /  SaO2: 99          ======================Micro/Rad/Cardio=================  CXR: Reviewed  Echo:Reviewed  ======================================================  PAST MEDICAL & SURGICAL HISTORY:  HLD (hyperlipidemia)    Hypothyroidism    DM (diabetes mellitus)    HTN (hypertension)    S/P VLADISLAV (total abdominal hysterectomy)      ====================ASSESSMENT ==============  Multivessel CAD S/P C3L on 2/17  Acute Blood Loss Anemia S/P 2u PRBC transfused intraoperatively  Hypovolemia  DM type 2  Hypophosphatemia    Plan:  ====================== NEUROLOGY=====================  Continue to assess and monitor neuro status as per ICU protocol  Pain management with Tylenol and Oxycodone PRN    acetaminophen   Tablet .. 650 milliGRAM(s) Oral every 6 hours PRN Mild Pain (1 - 3)  oxyCODONE    IR 5 milliGRAM(s) Oral every 4 hours PRN Moderate Pain (4 - 6)    ==================== RESPIRATORY======================  Extubated overnight, currently on supplemental O2 via 6L nasal cannula, SpO2 99%  Continue to monitor SpO2 via pulse oximetry, follow ABGs    Mechanical Ventilation:  Mode: CPAP with PS  FiO2: 50  PEEP: 5  PS: 15      ====================CARDIOVASCULAR==================  Multivessel CAD s/p C3L on 2/17  Blood pressure support with Metoprolol  Dual antiplatelet therapy with ASA/Plavix, along with Lipitor for graft patency  Monitor hemodynamics, trend perfusion indices    aspirin enteric coated 81 milliGRAM(s) Oral daily  atorvastatin 40 milliGRAM(s) Oral at bedtime  clopidogrel Tablet 75 milliGRAM(s) Oral daily  metoprolol tartrate 25 milliGRAM(s) Oral two times a day    ===================HEMATOLOGIC/ONC ===================  S/p 2u pRBC transfused intraoperatively yesterday  Continue closely monitoring levels of hemoglobin, hematocrit and platelets  Start Lovenox for DVT ppx tomorrow (ordered)    ===================== RENAL =========================  Monitor lytes, BUN/Creatinine, urine output and I/Os  Replenish electrolytes, keep K > 4 and Mg > 2  Continue sterling for today    ==================== GASTROINTESTINAL===================  Tolerating regular PO diet  Reglan for intestinal motilty  Miralax for bowel regimen    metoclopramide Injectable 10 milliGRAM(s) IV Push every 8 hours  GI prophylaxis, pantoprazole    Tablet 40 milliGRAM(s) Oral before breakfast  polyethylene glycol 3350 17 Gram(s) Oral daily  potassium chloride  10 mEq/50 mL IVPB 10 milliEquivalent(s) IV Intermittent every 1 hour  potassium chloride  10 mEq/50 mL IVPB 10 milliEquivalent(s) IV Intermittent every 1 hour  potassium chloride  10 mEq/50 mL IVPB 10 milliEquivalent(s) IV Intermittent every 1 hour  sodium chloride 0.9%. 1000 milliLiter(s) (10 mL/Hr) IV Continuous <Continuous>    =======================    ENDOCRINE  =====================  Hx of type 2 diabetes mellitus, requiring glycemic control with an IV Insulin gtt  Continue to trend blood glucose levels  Hypothyroidism, c/w Synthroid    dextrose 40% Gel 15 Gram(s) Oral once  dextrose 50% Injectable 25 Gram(s) IV Push once  dextrose 50% Injectable 12.5 Gram(s) IV Push once  dextrose 50% Injectable 25 Gram(s) IV Push once  glucagon  Injectable 1 milliGRAM(s) IntraMuscular once  insulin regular Infusion 3 Unit(s)/Hr (3 mL/Hr) IV Continuous <Continuous>  levothyroxine 100 MICROGram(s) Oral daily    ========================INFECTIOUS DISEASE================  Perioperative antibiotic coverage with IV Cefuroxime  Temp 98F, Leukocytosis with WBC elevated and uptrending at 19.71   Trend fever curve & WBC    cefuroxime  IVPB 1500 milliGRAM(s) IV Intermittent every 8 hours      Patient requires continuous monitoring with bedside rhythm monitoring, pulse ox monitoring, and intermittent blood gas analysis. Care plan discussed with ICU care team. Patient remained critical and at risk for life threatening decompensation.     By signing my name below, I, Junior Pagan, attest that this documentation has been prepared under the direction and in the presence of RENO James  Electronically signed: Toni Posey, 02-18-21 @ 08:29    I, RENO James, personally performed the services described in this documentation. All medical record entries made by the sarahibe were at my direction and in my presence. I have reviewed the chart and agree that the record reflects my personal performance and is accurate and complete  Electronically signed: RENO James

## 2021-02-18 NOTE — PHYSICAL THERAPY INITIAL EVALUATION ADULT - PERTINENT HX OF CURRENT PROBLEM, REHAB EVAL
70 year old female (denies implanted devices) with significant PMHx of former 5 pack year cigarette smoker, T2DM, hypothyroidism, HTN, HLD; admitted to [facility] 2/8/2021 with NSTEMI and transferred to [facility] for LHC with Dr. Duggan. Upon admission to [facility], patient c/o chest pain radiating to jaw and both arms that began 8 days prior to admit. LHC revealed 80-90% stenosis to LAD, 80-90% stenosis to prox diag, 80-90% to prox Cx, 70-80 to mid/distal RCA and recommended for CTS eval.

## 2021-02-18 NOTE — PROGRESS NOTE ADULT - PROBLEM SELECTOR PLAN 3
rocky consulted for uncontrolled dm2- A1C 9.2;   insulin infusion, transition to lantus /admelog  accuchecks ac and hs  diabetic diet

## 2021-02-18 NOTE — PROGRESS NOTE ADULT - ASSESSMENT
71 yo Female (denies implanted devices) with significant PMHx of former 5 pack year cigarette smoker, T2DM (last HgA1c unknown, managed by endocrinologist in [place], denies complications), hypothyroidism, HTN, HLD admitted to [facility] on 2/8/2021 with NSTEMI and transferred to [facility] 2/11/21 for LHC with Dr. Duggan.  LHC revealed 80-90% stenosis to LAD, 80-90% stenosis to prox diag, 80-90% to prox Cx, 70-80 to mid/distal RCA. CTS consult called to evaluate patient for cardiac surgery.    2/12 Pre op Cardiac Surgery in progress. D/C Brilinta.   P2Y12 94 . Tentatively scheduled for Cardiac surgery with Dr. Adhikari on Wednesday 2nd case 2/17 2/13 VSS; rsr 60-70; stable at this time echo neg; carotids neg; repeat P2y12 in am; endo consulted for uncontrolled dm2- A1C 9.2; pt placed on lantus and admelog   continue asa/ statin/ b-blockers; hep gttp; ARB d/c preop   OR 2/17 with Dr. Adhikari   2/14 VSS - no c/o cp this am - maintain hep gtt & bb-will inc. to 50mg po bid-or tue  2/15 VSS- no chest pain today - continue on hep gtt  2/16 VSS- No chest pain- continue on hep gtt. Preop workup completed. Plan for CABG with Dr. Adhikari tomorrow.   2/17 s /p CABG, with JUAN CARLOS , LIMA-LAD, SVG-RCA, SVG-Circ   Post op hypotension, ivf, pressors  Extubated d 0  Hyperglycemia  insulin infusion  MS/ L pl remain in place  Transferred to [facility]

## 2021-02-18 NOTE — PROGRESS NOTE ADULT - SUBJECTIVE AND OBJECTIVE BOX
Chief complaint  Patient is a 70y old  Female who presents with a chief complaint of Multivessel CAD (17 Feb 2021 17:43)   Review of systems  Patient in bed, looks comfortable, no hypoglycemic episodes.    Labs and Fingersticks  CAPILLARY BLOOD GLUCOSE      POCT Blood Glucose.: 163 mg/dL (18 Feb 2021 10:57)  POCT Blood Glucose.: 244 mg/dL (18 Feb 2021 09:47)  POCT Blood Glucose.: 257 mg/dL (18 Feb 2021 08:56)  POCT Blood Glucose.: 186 mg/dL (18 Feb 2021 08:00)  POCT Blood Glucose.: 138 mg/dL (18 Feb 2021 03:43)  POCT Blood Glucose.: 128 mg/dL (18 Feb 2021 03:07)  POCT Blood Glucose.: 119 mg/dL (18 Feb 2021 02:38)  POCT Blood Glucose.: 89 mg/dL (18 Feb 2021 02:07)  POCT Blood Glucose.: 79 mg/dL (18 Feb 2021 01:43)  POCT Blood Glucose.: 80 mg/dL (18 Feb 2021 01:20)  POCT Blood Glucose.: 115 mg/dL (17 Feb 2021 22:47)  POCT Blood Glucose.: 127 mg/dL (17 Feb 2021 21:53)  POCT Blood Glucose.: 113 mg/dL (17 Feb 2021 19:00)  POCT Blood Glucose.: 140 mg/dL (17 Feb 2021 14:02)    Medications  MEDICATIONS  (STANDING):  aspirin enteric coated 81 milliGRAM(s) Oral daily  atorvastatin 40 milliGRAM(s) Oral at bedtime  cefuroxime  IVPB 1500 milliGRAM(s) IV Intermittent every 8 hours  chlorhexidine 2% Cloths 1 Application(s) Topical <User Schedule>  clopidogrel Tablet 75 milliGRAM(s) Oral daily  dextrose 40% Gel 15 Gram(s) Oral once  dextrose 50% Injectable 25 Gram(s) IV Push once  dextrose 50% Injectable 12.5 Gram(s) IV Push once  dextrose 50% Injectable 25 Gram(s) IV Push once  dorzolamide 2% Ophthalmic Solution 1 Drop(s) Both EYES <User Schedule>  glucagon  Injectable 1 milliGRAM(s) IntraMuscular once  insulin regular Infusion 3 Unit(s)/Hr (3 mL/Hr) IV Continuous <Continuous>  levothyroxine 100 MICROGram(s) Oral daily  metoclopramide Injectable 10 milliGRAM(s) IV Push every 8 hours  metoprolol tartrate 25 milliGRAM(s) Oral two times a day  pantoprazole    Tablet 40 milliGRAM(s) Oral before breakfast  polyethylene glycol 3350 17 Gram(s) Oral daily  potassium chloride  10 mEq/50 mL IVPB 10 milliEquivalent(s) IV Intermittent every 1 hour  potassium chloride  10 mEq/50 mL IVPB 10 milliEquivalent(s) IV Intermittent every 1 hour  potassium chloride  10 mEq/50 mL IVPB 10 milliEquivalent(s) IV Intermittent every 1 hour  senna 2 Tablet(s) Oral at bedtime  sodium chloride 0.9%. 1000 milliLiter(s) (10 mL/Hr) IV Continuous <Continuous>      Physical Exam  General: Patient comfortable in bed  Vital Signs Last 12 Hrs  T(F): 98.4 (02-18-21 @ 11:54), Max: 99.7 (02-18-21 @ 04:00)  HR: 96 (02-18-21 @ 11:54) (84 - 107)  BP: 116/67 (02-18-21 @ 11:54) (98/56 - 116/67)  BP(mean): 86 (02-18-21 @ 11:54) (73 - 86)  RR: 18 (02-18-21 @ 11:54) (3 - 36)  SpO2: 100% (02-18-21 @ 11:54) (98% - 100%)  Neck: No palpable thyroid nodules.  CVS: S1S2, No murmurs       Chief complaint  Patient is a 70y old  Female who presents with a chief complaint of Multivessel CAD (17 Feb 2021 17:43)   Review of systems  Patient in bed, looks comfortable, no hypoglycemic episodes.    Labs and Fingersticks  CAPILLARY BLOOD GLUCOSE      POCT Blood Glucose.: 163 mg/dL (18 Feb 2021 10:57)  POCT Blood Glucose.: 244 mg/dL (18 Feb 2021 09:47)  POCT Blood Glucose.: 257 mg/dL (18 Feb 2021 08:56)  POCT Blood Glucose.: 186 mg/dL (18 Feb 2021 08:00)  POCT Blood Glucose.: 138 mg/dL (18 Feb 2021 03:43)  POCT Blood Glucose.: 128 mg/dL (18 Feb 2021 03:07)  POCT Blood Glucose.: 119 mg/dL (18 Feb 2021 02:38)  POCT Blood Glucose.: 89 mg/dL (18 Feb 2021 02:07)  POCT Blood Glucose.: 79 mg/dL (18 Feb 2021 01:43)  POCT Blood Glucose.: 80 mg/dL (18 Feb 2021 01:20)  POCT Blood Glucose.: 115 mg/dL (17 Feb 2021 22:47)  POCT Blood Glucose.: 127 mg/dL (17 Feb 2021 21:53)  POCT Blood Glucose.: 113 mg/dL (17 Feb 2021 19:00)  POCT Blood Glucose.: 140 mg/dL (17 Feb 2021 14:02)    Medications  MEDICATIONS  (STANDING):  aspirin enteric coated 81 milliGRAM(s) Oral daily  atorvastatin 40 milliGRAM(s) Oral at bedtime  cefuroxime  IVPB 1500 milliGRAM(s) IV Intermittent every 8 hours  chlorhexidine 2% Cloths 1 Application(s) Topical <User Schedule>  clopidogrel Tablet 75 milliGRAM(s) Oral daily  dextrose 40% Gel 15 Gram(s) Oral once  dextrose 50% Injectable 25 Gram(s) IV Push once  dextrose 50% Injectable 12.5 Gram(s) IV Push once  dextrose 50% Injectable 25 Gram(s) IV Push once  dorzolamide 2% Ophthalmic Solution 1 Drop(s) Both EYES <User Schedule>  glucagon  Injectable 1 milliGRAM(s) IntraMuscular once  insulin regular Infusion 3 Unit(s)/Hr (3 mL/Hr) IV Continuous <Continuous>  levothyroxine 100 MICROGram(s) Oral daily  metoclopramide Injectable 10 milliGRAM(s) IV Push every 8 hours  metoprolol tartrate 25 milliGRAM(s) Oral two times a day  pantoprazole    Tablet 40 milliGRAM(s) Oral before breakfast  polyethylene glycol 3350 17 Gram(s) Oral daily  potassium chloride  10 mEq/50 mL IVPB 10 milliEquivalent(s) IV Intermittent every 1 hour  potassium chloride  10 mEq/50 mL IVPB 10 milliEquivalent(s) IV Intermittent every 1 hour  potassium chloride  10 mEq/50 mL IVPB 10 milliEquivalent(s) IV Intermittent every 1 hour  senna 2 Tablet(s) Oral at bedtime  sodium chloride 0.9%. 1000 milliLiter(s) (10 mL/Hr) IV Continuous <Continuous>      Physical Exam  General: Patient comfortable in bed  Vital Signs Last 12 Hrs  T(F): 98.4 (02-18-21 @ 11:54), Max: 99.7 (02-18-21 @ 04:00)  HR: 96 (02-18-21 @ 11:54) (84 - 107)  BP: 116/67 (02-18-21 @ 11:54) (98/56 - 116/67)  BP(mean): 86 (02-18-21 @ 11:54) (73 - 86)  RR: 18 (02-18-21 @ 11:54) (3 - 36)  SpO2: 100% (02-18-21 @ 11:54) (98% - 100%)  Neck: No palpable thyroid nodules.  CVS: S1S2, No murmurs

## 2021-02-18 NOTE — PHYSICAL THERAPY INITIAL EVALUATION ADULT - PLANNED THERAPY INTERVENTIONS, PT EVAL
stair training; GOAL: Pt will negotiate up & down 10 stairs independently with unilateral HR & least restrictive AD, in 2 weeks./balance training/bed mobility training/gait training/transfer training

## 2021-02-18 NOTE — PROGRESS NOTE ADULT - PROBLEM SELECTOR PROBLEM 4
HLD (hyperlipidemia)
HTN (hypertension)
HLD (hyperlipidemia)

## 2021-02-19 LAB
ALBUMIN SERPL ELPH-MCNC: 3.3 G/DL — SIGNIFICANT CHANGE UP (ref 3.3–5)
ALP SERPL-CCNC: 48 U/L — SIGNIFICANT CHANGE UP (ref 40–120)
ALT FLD-CCNC: 21 U/L — SIGNIFICANT CHANGE UP (ref 10–45)
ANION GAP SERPL CALC-SCNC: 11 MMOL/L — SIGNIFICANT CHANGE UP (ref 5–17)
AST SERPL-CCNC: 34 U/L — SIGNIFICANT CHANGE UP (ref 10–40)
BILIRUB SERPL-MCNC: 0.3 MG/DL — SIGNIFICANT CHANGE UP (ref 0.2–1.2)
BUN SERPL-MCNC: 15 MG/DL — SIGNIFICANT CHANGE UP (ref 7–23)
CALCIUM SERPL-MCNC: 8.5 MG/DL — SIGNIFICANT CHANGE UP (ref 8.4–10.5)
CHLORIDE SERPL-SCNC: 108 MMOL/L — SIGNIFICANT CHANGE UP (ref 96–108)
CO2 SERPL-SCNC: 22 MMOL/L — SIGNIFICANT CHANGE UP (ref 22–31)
CREAT SERPL-MCNC: 0.71 MG/DL — SIGNIFICANT CHANGE UP (ref 0.5–1.3)
GLUCOSE BLDC GLUCOMTR-MCNC: 107 MG/DL — HIGH (ref 70–99)
GLUCOSE BLDC GLUCOMTR-MCNC: 170 MG/DL — HIGH (ref 70–99)
GLUCOSE BLDC GLUCOMTR-MCNC: 219 MG/DL — HIGH (ref 70–99)
GLUCOSE BLDC GLUCOMTR-MCNC: 229 MG/DL — HIGH (ref 70–99)
GLUCOSE BLDC GLUCOMTR-MCNC: 243 MG/DL — HIGH (ref 70–99)
GLUCOSE BLDC GLUCOMTR-MCNC: 249 MG/DL — HIGH (ref 70–99)
GLUCOSE BLDC GLUCOMTR-MCNC: 272 MG/DL — HIGH (ref 70–99)
GLUCOSE BLDC GLUCOMTR-MCNC: 309 MG/DL — HIGH (ref 70–99)
GLUCOSE BLDC GLUCOMTR-MCNC: 310 MG/DL — HIGH (ref 70–99)
GLUCOSE SERPL-MCNC: 201 MG/DL — HIGH (ref 70–99)
HCT VFR BLD CALC: 33.3 % — LOW (ref 34.5–45)
HGB BLD-MCNC: 10.7 G/DL — LOW (ref 11.5–15.5)
MAGNESIUM SERPL-MCNC: 2.1 MG/DL — SIGNIFICANT CHANGE UP (ref 1.6–2.6)
MCHC RBC-ENTMCNC: 30.1 PG — SIGNIFICANT CHANGE UP (ref 27–34)
MCHC RBC-ENTMCNC: 32.1 GM/DL — SIGNIFICANT CHANGE UP (ref 32–36)
MCV RBC AUTO: 93.5 FL — SIGNIFICANT CHANGE UP (ref 80–100)
NRBC # BLD: 0 /100 WBCS — SIGNIFICANT CHANGE UP (ref 0–0)
PHOSPHATE SERPL-MCNC: 2.4 MG/DL — LOW (ref 2.5–4.5)
PLATELET # BLD AUTO: 166 K/UL — SIGNIFICANT CHANGE UP (ref 150–400)
POTASSIUM SERPL-MCNC: 4.7 MMOL/L — SIGNIFICANT CHANGE UP (ref 3.5–5.3)
POTASSIUM SERPL-SCNC: 4.7 MMOL/L — SIGNIFICANT CHANGE UP (ref 3.5–5.3)
PROT SERPL-MCNC: 6.1 G/DL — SIGNIFICANT CHANGE UP (ref 6–8.3)
RBC # BLD: 3.56 M/UL — LOW (ref 3.8–5.2)
RBC # FLD: 14.4 % — SIGNIFICANT CHANGE UP (ref 10.3–14.5)
SODIUM SERPL-SCNC: 141 MMOL/L — SIGNIFICANT CHANGE UP (ref 135–145)
WBC # BLD: 23.39 K/UL — HIGH (ref 3.8–10.5)
WBC # FLD AUTO: 23.39 K/UL — HIGH (ref 3.8–10.5)

## 2021-02-19 PROCEDURE — 93010 ELECTROCARDIOGRAM REPORT: CPT

## 2021-02-19 PROCEDURE — 71045 X-RAY EXAM CHEST 1 VIEW: CPT | Mod: 26

## 2021-02-19 RX ORDER — INSULIN LISPRO 100/ML
9 VIAL (ML) SUBCUTANEOUS
Refills: 0 | Status: DISCONTINUED | OUTPATIENT
Start: 2021-02-19 | End: 2021-02-20

## 2021-02-19 RX ORDER — INSULIN HUMAN 100 [IU]/ML
2 INJECTION, SOLUTION SUBCUTANEOUS
Qty: 100 | Refills: 0 | Status: DISCONTINUED | OUTPATIENT
Start: 2021-02-19 | End: 2021-02-22

## 2021-02-19 RX ORDER — HALOPERIDOL DECANOATE 100 MG/ML
2 INJECTION INTRAMUSCULAR ONCE
Refills: 0 | Status: COMPLETED | OUTPATIENT
Start: 2021-02-19 | End: 2021-02-19

## 2021-02-19 RX ORDER — INSULIN GLARGINE 100 [IU]/ML
20 INJECTION, SOLUTION SUBCUTANEOUS AT BEDTIME
Refills: 0 | Status: DISCONTINUED | OUTPATIENT
Start: 2021-02-19 | End: 2021-02-20

## 2021-02-19 RX ORDER — METOPROLOL TARTRATE 50 MG
5 TABLET ORAL ONCE
Refills: 0 | Status: COMPLETED | OUTPATIENT
Start: 2021-02-19 | End: 2021-02-19

## 2021-02-19 RX ORDER — HALOPERIDOL DECANOATE 100 MG/ML
5 INJECTION INTRAMUSCULAR ONCE
Refills: 0 | Status: COMPLETED | OUTPATIENT
Start: 2021-02-19 | End: 2021-02-19

## 2021-02-19 RX ORDER — METOPROLOL TARTRATE 50 MG
25 TABLET ORAL EVERY 8 HOURS
Refills: 0 | Status: DISCONTINUED | OUTPATIENT
Start: 2021-02-19 | End: 2021-02-20

## 2021-02-19 RX ADMIN — Medication 10 MILLIGRAM(S): at 15:16

## 2021-02-19 RX ADMIN — Medication 100 MICROGRAM(S): at 05:14

## 2021-02-19 RX ADMIN — Medication 25 MILLIGRAM(S): at 11:44

## 2021-02-19 RX ADMIN — Medication 25 MILLIGRAM(S): at 05:14

## 2021-02-19 RX ADMIN — Medication 10 MILLIGRAM(S): at 05:14

## 2021-02-19 RX ADMIN — Medication 5 MILLIGRAM(S): at 22:19

## 2021-02-19 RX ADMIN — PANTOPRAZOLE SODIUM 40 MILLIGRAM(S): 20 TABLET, DELAYED RELEASE ORAL at 05:14

## 2021-02-19 RX ADMIN — Medication 1 MILLIGRAM(S): at 18:56

## 2021-02-19 RX ADMIN — Medication 81 MILLIGRAM(S): at 11:44

## 2021-02-19 RX ADMIN — Medication 4: at 07:59

## 2021-02-19 RX ADMIN — Medication 1 MILLIGRAM(S): at 19:37

## 2021-02-19 RX ADMIN — INSULIN HUMAN 2 UNIT(S)/HR: 100 INJECTION, SOLUTION SUBCUTANEOUS at 12:26

## 2021-02-19 RX ADMIN — CHLORHEXIDINE GLUCONATE 1 APPLICATION(S): 213 SOLUTION TOPICAL at 12:16

## 2021-02-19 RX ADMIN — HALOPERIDOL DECANOATE 2 MILLIGRAM(S): 100 INJECTION INTRAMUSCULAR at 17:36

## 2021-02-19 RX ADMIN — Medication 5 UNIT(S): at 07:59

## 2021-02-19 RX ADMIN — CLOPIDOGREL BISULFATE 75 MILLIGRAM(S): 75 TABLET, FILM COATED ORAL at 11:44

## 2021-02-19 RX ADMIN — DORZOLAMIDE HYDROCHLORIDE 1 DROP(S): 20 SOLUTION/ DROPS OPHTHALMIC at 11:45

## 2021-02-19 RX ADMIN — HALOPERIDOL DECANOATE 2 MILLIGRAM(S): 100 INJECTION INTRAMUSCULAR at 18:45

## 2021-02-19 RX ADMIN — Medication: at 19:48

## 2021-02-19 RX ADMIN — Medication 650 MILLIGRAM(S): at 03:51

## 2021-02-19 RX ADMIN — INSULIN GLARGINE 20 UNIT(S): 100 INJECTION, SOLUTION SUBCUTANEOUS at 22:20

## 2021-02-19 RX ADMIN — OXYCODONE HYDROCHLORIDE 5 MILLIGRAM(S): 5 TABLET ORAL at 15:51

## 2021-02-19 RX ADMIN — OXYCODONE HYDROCHLORIDE 10 MILLIGRAM(S): 5 TABLET ORAL at 09:52

## 2021-02-19 RX ADMIN — Medication 10 MILLIGRAM(S): at 22:20

## 2021-02-19 RX ADMIN — POLYETHYLENE GLYCOL 3350 17 GRAM(S): 17 POWDER, FOR SOLUTION ORAL at 11:44

## 2021-02-19 RX ADMIN — ENOXAPARIN SODIUM 40 MILLIGRAM(S): 100 INJECTION SUBCUTANEOUS at 11:44

## 2021-02-19 NOTE — PROGRESS NOTE ADULT - SUBJECTIVE AND OBJECTIVE BOX
Chief complaint  Patient is a 70y old  Female who presents with a chief complaint of Multivessel CAD (17 Feb 2021 17:43)   Review of systems  Patient in bed, looks comfortable, no hypoglycemic episodes.    Labs and Fingersticks  CAPILLARY BLOOD GLUCOSE      POCT Blood Glucose.: 309 mg/dL (19 Feb 2021 12:51)  POCT Blood Glucose.: 310 mg/dL (19 Feb 2021 11:47)  POCT Blood Glucose.: 229 mg/dL (19 Feb 2021 07:24)  POCT Blood Glucose.: 112 mg/dL (18 Feb 2021 23:25)  POCT Blood Glucose.: 99 mg/dL (18 Feb 2021 22:25)  POCT Blood Glucose.: 143 mg/dL (18 Feb 2021 21:12)  POCT Blood Glucose.: 140 mg/dL (18 Feb 2021 20:15)  POCT Blood Glucose.: 165 mg/dL (18 Feb 2021 19:31)  POCT Blood Glucose.: 166 mg/dL (18 Feb 2021 18:15)  POCT Blood Glucose.: 76 mg/dL (18 Feb 2021 17:14)  POCT Blood Glucose.: 74 mg/dL (18 Feb 2021 16:38)  POCT Blood Glucose.: 109 mg/dL (18 Feb 2021 15:38)  POCT Blood Glucose.: 127 mg/dL (18 Feb 2021 13:53)      Anion Gap, Serum: 11 (02-19 @ 05:40)  Anion Gap, Serum: 10 (02-18 @ 00:18)      Calcium, Total Serum: 8.5 (02-19 @ 05:40)  Calcium, Total Serum: 8.5 (02-18 @ 00:18)  Albumin, Serum: 3.3 (02-19 @ 05:40)  Albumin, Serum: 3.6 (02-18 @ 00:18)    Alanine Aminotransferase (ALT/SGPT): 21 (02-19 @ 05:40)  Alanine Aminotransferase (ALT/SGPT): 27 (02-18 @ 00:18)  Alkaline Phosphatase, Serum: 48 (02-19 @ 05:40)  Alkaline Phosphatase, Serum: 45 (02-18 @ 00:18)  Aspartate Aminotransferase (AST/SGOT): 34 (02-19 @ 05:40)  Aspartate Aminotransferase (AST/SGOT): 53 <H> (02-18 @ 00:18)        02-19    141  |  108  |  15  ----------------------------<  201<H>  4.7   |  22  |  0.71    Ca    8.5      19 Feb 2021 05:40  Phos  2.4     02-19  Mg     2.1     02-19    TPro  6.1  /  Alb  3.3  /  TBili  0.3  /  DBili  x   /  AST  34  /  ALT  21  /  AlkPhos  48  02-19                        10.7   23.39 )-----------( 166      ( 19 Feb 2021 05:40 )             33.3     Medications  MEDICATIONS  (STANDING):  aspirin enteric coated 81 milliGRAM(s) Oral daily  atorvastatin 40 milliGRAM(s) Oral at bedtime  chlorhexidine 2% Cloths 1 Application(s) Topical <User Schedule>  clopidogrel Tablet 75 milliGRAM(s) Oral daily  dextrose 40% Gel 15 Gram(s) Oral once  dextrose 50% Injectable 25 Gram(s) IV Push once  dorzolamide 2% Ophthalmic Solution 1 Drop(s) Both EYES <User Schedule>  enoxaparin Injectable 40 milliGRAM(s) SubCutaneous daily  glucagon  Injectable 1 milliGRAM(s) IntraMuscular once  insulin glargine Injectable (LANTUS) 20 Unit(s) SubCutaneous at bedtime  insulin lispro (ADMELOG) corrective regimen sliding scale   SubCutaneous three times a day before meals  insulin lispro (ADMELOG) corrective regimen sliding scale   SubCutaneous at bedtime  insulin lispro Injectable (ADMELOG) 9 Unit(s) SubCutaneous three times a day before meals  insulin regular Infusion 2 Unit(s)/Hr (2 mL/Hr) IV Continuous <Continuous>  levothyroxine 100 MICROGram(s) Oral daily  metoclopramide Injectable 10 milliGRAM(s) IV Push every 8 hours  metoprolol tartrate 25 milliGRAM(s) Oral every 8 hours  pantoprazole    Tablet 40 milliGRAM(s) Oral before breakfast  polyethylene glycol 3350 17 Gram(s) Oral daily  senna 2 Tablet(s) Oral at bedtime  sodium chloride 0.9%. 1000 milliLiter(s) (10 mL/Hr) IV Continuous <Continuous>      Physical Exam  General: Patient comfortable in bed  Vital Signs Last 12 Hrs  T(F): 98.4 (02-19-21 @ 12:30), Max: 98.4 (02-19-21 @ 12:30)  HR: 118 (02-19-21 @ 12:30) (100 - 122)  BP: 152/71 (02-19-21 @ 12:30) (124/64 - 152/71)  BP(mean): 88 (02-19-21 @ 06:56) (86 - 97)  RR: 18 (02-19-21 @ 12:30) (18 - 18)  SpO2: 97% (02-19-21 @ 12:30) (95% - 97%)      Diagnostics    Free Thyroxine, Serum: AM Sched. Collection: 15-Feb-2021 06:00 (02-14 @ 14:33)           Chief complaint  Patient is a 70y old  Female who presents with a chief complaint of Multivessel CAD (17 Feb 2021 17:43)   Review of systems  Patient in bed, looks comfortable, no hypoglycemic episodes.    Labs and Fingersticks  CAPILLARY BLOOD GLUCOSE      POCT Blood Glucose.: 309 mg/dL (19 Feb 2021 12:51)  POCT Blood Glucose.: 310 mg/dL (19 Feb 2021 11:47)  POCT Blood Glucose.: 229 mg/dL (19 Feb 2021 07:24)  POCT Blood Glucose.: 112 mg/dL (18 Feb 2021 23:25)  POCT Blood Glucose.: 99 mg/dL (18 Feb 2021 22:25)  POCT Blood Glucose.: 143 mg/dL (18 Feb 2021 21:12)  POCT Blood Glucose.: 140 mg/dL (18 Feb 2021 20:15)  POCT Blood Glucose.: 165 mg/dL (18 Feb 2021 19:31)  POCT Blood Glucose.: 166 mg/dL (18 Feb 2021 18:15)  POCT Blood Glucose.: 76 mg/dL (18 Feb 2021 17:14)  POCT Blood Glucose.: 74 mg/dL (18 Feb 2021 16:38)  POCT Blood Glucose.: 109 mg/dL (18 Feb 2021 15:38)  POCT Blood Glucose.: 127 mg/dL (18 Feb 2021 13:53)      Anion Gap, Serum: 11 (02-19 @ 05:40)  Anion Gap, Serum: 10 (02-18 @ 00:18)      Calcium, Total Serum: 8.5 (02-19 @ 05:40)  Calcium, Total Serum: 8.5 (02-18 @ 00:18)  Albumin, Serum: 3.3 (02-19 @ 05:40)  Albumin, Serum: 3.6 (02-18 @ 00:18)    Alanine Aminotransferase (ALT/SGPT): 21 (02-19 @ 05:40)  Alanine Aminotransferase (ALT/SGPT): 27 (02-18 @ 00:18)  Alkaline Phosphatase, Serum: 48 (02-19 @ 05:40)  Alkaline Phosphatase, Serum: 45 (02-18 @ 00:18)  Aspartate Aminotransferase (AST/SGOT): 34 (02-19 @ 05:40)  Aspartate Aminotransferase (AST/SGOT): 53 <H> (02-18 @ 00:18)        02-19    141  |  108  |  15  ----------------------------<  201<H>  4.7   |  22  |  0.71    Ca    8.5      19 Feb 2021 05:40  Phos  2.4     02-19  Mg     2.1     02-19    TPro  6.1  /  Alb  3.3  /  TBili  0.3  /  DBili  x   /  AST  34  /  ALT  21  /  AlkPhos  48  02-19                        10.7   23.39 )-----------( 166      ( 19 Feb 2021 05:40 )             33.3     Medications  MEDICATIONS  (STANDING):  aspirin enteric coated 81 milliGRAM(s) Oral daily  atorvastatin 40 milliGRAM(s) Oral at bedtime  chlorhexidine 2% Cloths 1 Application(s) Topical <User Schedule>  clopidogrel Tablet 75 milliGRAM(s) Oral daily  dextrose 40% Gel 15 Gram(s) Oral once  dextrose 50% Injectable 25 Gram(s) IV Push once  dorzolamide 2% Ophthalmic Solution 1 Drop(s) Both EYES <User Schedule>  enoxaparin Injectable 40 milliGRAM(s) SubCutaneous daily  glucagon  Injectable 1 milliGRAM(s) IntraMuscular once  insulin glargine Injectable (LANTUS) 20 Unit(s) SubCutaneous at bedtime  insulin lispro (ADMELOG) corrective regimen sliding scale   SubCutaneous three times a day before meals  insulin lispro (ADMELOG) corrective regimen sliding scale   SubCutaneous at bedtime  insulin lispro Injectable (ADMELOG) 9 Unit(s) SubCutaneous three times a day before meals  insulin regular Infusion 2 Unit(s)/Hr (2 mL/Hr) IV Continuous <Continuous>  levothyroxine 100 MICROGram(s) Oral daily  metoclopramide Injectable 10 milliGRAM(s) IV Push every 8 hours  metoprolol tartrate 25 milliGRAM(s) Oral every 8 hours  pantoprazole    Tablet 40 milliGRAM(s) Oral before breakfast  polyethylene glycol 3350 17 Gram(s) Oral daily  senna 2 Tablet(s) Oral at bedtime  sodium chloride 0.9%. 1000 milliLiter(s) (10 mL/Hr) IV Continuous <Continuous>      Physical Exam  General: Patient comfortable in bed  Vital Signs Last 12 Hrs  T(F): 98.4 (02-19-21 @ 12:30), Max: 98.4 (02-19-21 @ 12:30)  HR: 118 (02-19-21 @ 12:30) (100 - 122)  BP: 152/71 (02-19-21 @ 12:30) (124/64 - 152/71)  BP(mean): 88 (02-19-21 @ 06:56) (86 - 97)  RR: 18 (02-19-21 @ 12:30) (18 - 18)  SpO2: 97% (02-19-21 @ 12:30) (95% - 97%)      Diagnostics    Free Thyroxine, Serum: AM Sched. Collection: 15-Feb-2021 06:00 (02-14 @ 14:33)

## 2021-02-19 NOTE — PROGRESS NOTE ADULT - PROBLEM SELECTOR PLAN 1
Suggest to continue IV insulin for now and transition to the following basal bolus insulin regimen at dinner assuming blood sugars are stable:  -Lantus 20u at bedtime  -Admelog 9u TID before each meal  -Admelog correction scale coverage ac/hs  Will adjust insulin dose based on her FS/insulin requirement. Will continue monitoring FS, log, and FU.    patient for compliance with consistent low carb diet.

## 2021-02-19 NOTE — PROVIDER CONTACT NOTE (OTHER) - ACTION/TREATMENT ORDERED:
Medication given as ordered, see EMR. Pt. continues to be agitated. Providers @ bedside. Staff @ bedside for continuous monitoring. Safety maintained. Will continue to monitor.

## 2021-02-19 NOTE — PROVIDER CONTACT NOTE (OTHER) - ASSESSMENT
Pt. alert, confused & agitated. Multiple attempts made to reorient & deescalate patient but unable to. Provider @ bedside.

## 2021-02-19 NOTE — PROGRESS NOTE ADULT - ASSESSMENT
Assessment  DMT2: 70y Female with DM T2 with hyperglycemia admitted with chest pain, A1C 9.2%, was on insulin at home, postop transitioned to low-dose basal bolus insulin by primary team, blood sugars running high and not at postop target today, restarted on IV insulin this afternoon, eating meals.  CAD: CABG 2/17, on medications, monitored, stable.  Hypothyroidism: on Synthroid 100 mcg po daily, compliant with Synthroid intake, subclinical hypothyroid, FT4 1.4.  Overweight: No strict exercise routines, neither on low calorie diet.        Flor Torres MD  Cell: [phone]  Office: [phone]               Assessment  DMT2: 70y Female with DM T2 with hyperglycemia admitted with chest pain, A1C 9.2%, was on insulin at home, postop transitioned to low-dose basal bolus insulin by primary team, blood sugars running high and not at postop target today,  restarted on IV insulin this afternoon, eating meals.  CAD: CABG 2/17, on medications, monitored, stable.  Hypothyroidism: on Synthroid 100 mcg po daily, compliant with Synthroid intake, subclinical hypothyroid, FT4 1.4.  Overweight: No strict exercise routines, neither on low calorie diet.        Flor Torres MD  Cell: [phone]  Office: [phone]

## 2021-02-19 NOTE — PROVIDER CONTACT NOTE (OTHER) - SITUATION
Pt. extremely agitated, combative towards staff, pulling out lines. @ risk for harming self & others.
pt in bed c/o pain on the jaw area radiating to neck

## 2021-02-19 NOTE — PROGRESS NOTE ADULT - ASSESSMENT
This is a 69 y/o female with significant PMHx of former 5 pack year cigarette smoker, T2DM (HgA1c 9.2 this admission) managed by endocrinologist in [place], denies complications), hypothyroidism, HTN, HLD admitted to [facility] on 2/8/2021 with NSTEMI and transferred to [facility] 2/11/21 for C with Dr. Duggan.  LHC revealed 80-90% stenosis to LAD, 80-90% stenosis to prox diag, 80-90% to prox Cx, 70-80 to mid/distal RCA. CTS consult called to evaluate patient for cardiac surgery.  She is now POD #2 from CABGx3.    2/12 Pre op Cardiac Surgery in progress. D/C [place].  P2Y12 94   2/13 VSS; rsr 60-70; stable at this time echo neg; carotids neg; repeat P2y12 in am; endo consulted for uncontrolled dm2- A1C 9.2; pt placed on lantus and admelog   continue asa/ statin/ b-blockers; hep gttp; ARB d/c preop   2/14 VSS - no c/o cp this am - maintain hep gtt & bb-will inc. to 50mg po bid-or tue  2/15 VSS- no chest pain today - continue on hep gtt  2/16 VSS- No chest pain- continue on hep gtt. Preop workup completed. Plan for CABG with Dr. Adhikari tomorrow.   2/17 s /p CABG, with JUAN CARLOS , LIMA-LAD, SVG-RCA, SVG-Circ   Post op hypotension, ivf, pressors  Extubated d 0  Hyperglycemia  insulin infusion  MS/ L pl remain in place  Transferred to sdu  2/19 Med and Lt pleural CT removed, CXR stable.  F/U TOV.  Beta blocker inc'd 25 BID to TID for . FS in 300's, insulin gtt restarted.      Problem 1. CAD; S/P CABG x 3.    Plan: Asa, Statin, B-blocker,   Encouraged more IS and ambulation.    Shower pod #5.   All tubes removed today.  Wires remain.    CXR post tube removal stable w/o pneumothorax.     Problem 2. HTN  Continue Lopressor.     Problem 3.  DM (diabetes mellitus).   Plan:   Endo consulted for uncontrolled dm2- A1C 9.2;   insulin infusion post op, then transitioned to lantus /admelog.    Today FS >300 and insulin gtt restarted.  Will touch base with Endocrine.   diabetic diet.     Problem 4. HLD (hyperlipidemia).    Plan:  Continue statin.     Dispo: Home candidate.

## 2021-02-19 NOTE — PROGRESS NOTE ADULT - SUBJECTIVE AND OBJECTIVE BOX
Patient discussed on morning rounds with Dr. Adhikari    Operation / Date: CABGx3 (LIMA-LAD, SVG-RCA, SVG-Circ)    SUBJECTIVE ASSESSMENT:  Using  (ID# [id number]) patient stated earlier that she was having a lot of pain mid/lower chest, likely related to the chest tubes.  She was in so much pain, to the point that it was upsetting her.  The tubes were removed and she has since much improved.  Her pain is now better and she feels comfortable.  She denies CP, SOB, palpitations, dizziness, N/V/D, fever/chills, cough.   She ambulated earlier prior to the tubes coming out and did well.  She has a good PO appetite.  She has not yet had a BM since surgery but she is passing gas.      *The nurses state that she was pressing the buttons on the pumps earlier so they educated her on not touching them via  iPad.       Vital Signs Last 24 Hrs  T(C): 36.9 (19 Feb 2021 12:30), Max: 37 (18 Feb 2021 19:40)  T(F): 98.4 (19 Feb 2021 12:30), Max: 98.6 (18 Feb 2021 19:40)  HR: 118 (19 Feb 2021 12:30) (100 - 127)  BP: 152/71 (19 Feb 2021 12:30) (111/66 - 152/71)  BP(mean): 88 (19 Feb 2021 06:56) (79 - 97)  RR: 18 (19 Feb 2021 12:30) (16 - 21)  SpO2: 97% (19 Feb 2021 12:30) (95% - 99%)  I&O's Detail    18 Feb 2021 07:01  -  19 Feb 2021 07:00  --------------------------------------------------------  IN:    Insulin: 73 mL    IV PiggyBack: 100 mL    Oral Fluid: 1600 mL    sodium chloride 0.9%: 240 mL  Total IN: 2013 mL    OUT:    Chest Tube (mL): 50 mL    Chest Tube (mL): 115 mL    Indwelling Catheter - Urethral (mL): 1655 mL  Total OUT: 1820 mL    Total NET: 193 mL      19 Feb 2021 07:01  -  19 Feb 2021 12:56  --------------------------------------------------------  IN:    sodium chloride 0.9%: 250 mL  Total IN: 250 mL    OUT:    Chest Tube (mL): 0 mL    Chest Tube (mL): 0 mL    Indwelling Catheter - Urethral (mL): 300 mL  Total OUT: 300 mL    Total NET: -50 mL          CHEST TUBE:  Left pleural and mediastinal CT removed late morning today, patient tolerated procedure well. Occlusive dressing and tie downs left in place.  CXR performed post pull without pneumothorax or new collection.   EPICARDIAL WIRES: Yes  TIE DOWNS: Yes  SANCHEZ: No    PHYSICAL EXAM:    GEN: NAD, looks comfortable currently. Earlier before tubes were removed, she was very anxious and trying to get OOB.  She threatened to remove the chest tubes herself.  But now she is sitting in the chair, looks well, brushing her hair.    Psych: Mood appropriate  Neuro: A&Ox3.  No focal deficits.  Moving all extremities.   HEENT: No obvious abnormalities  CV: S1S2, regular, no murmurs appreciated.  No carotid bruits.  No JVD  Lungs: Clear B/L.  No wheezing, rales or rhonchi  ABD: Soft, non-tender, non-distended.  +Bowel sounds  EXT: Warm and well perfused.  No peripheral edema noted  Musculoskeletal: Moving all extremities with normal ROM, no joint swelling  PV: Pedal pulses palpable  Incision Sites: MSI with dressing in place, not saturated.  No sign of infection surrounding.     LABS:                        10.7   23.39 )-----------( 166      ( 19 Feb 2021 05:40 )             33.3         02-19    141  |  108  |  15  ----------------------------<  201<H>  4.7   |  22  |  0.71    Ca    8.5      19 Feb 2021 05:40  Phos  2.4     02-19  Mg     2.1     02-19    TPro  6.1  /  Alb  3.3  /  TBili  0.3  /  DBili  x   /  AST  34  /  ALT  21  /  AlkPhos  48  02-19          MEDICATIONS  (STANDING):  aspirin enteric coated 81 milliGRAM(s) Oral daily  atorvastatin 40 milliGRAM(s) Oral at bedtime  chlorhexidine 2% Cloths 1 Application(s) Topical <User Schedule>  clopidogrel Tablet 75 milliGRAM(s) Oral daily  dextrose 40% Gel 15 Gram(s) Oral once  dextrose 50% Injectable 25 Gram(s) IV Push once  dorzolamide 2% Ophthalmic Solution 1 Drop(s) Both EYES <User Schedule>  enoxaparin Injectable 40 milliGRAM(s) SubCutaneous daily  glucagon  Injectable 1 milliGRAM(s) IntraMuscular once  insulin glargine Injectable (LANTUS) 10 Unit(s) SubCutaneous at bedtime  insulin lispro (ADMELOG) corrective regimen sliding scale   SubCutaneous three times a day before meals  insulin lispro (ADMELOG) corrective regimen sliding scale   SubCutaneous at bedtime  insulin lispro Injectable (ADMELOG) 5 Unit(s) SubCutaneous three times a day before meals  insulin regular Infusion 2 Unit(s)/Hr (2 mL/Hr) IV Continuous <Continuous>  levothyroxine 100 MICROGram(s) Oral daily  metoclopramide Injectable 10 milliGRAM(s) IV Push every 8 hours  metoprolol tartrate 25 milliGRAM(s) Oral every 8 hours  pantoprazole    Tablet 40 milliGRAM(s) Oral before breakfast  polyethylene glycol 3350 17 Gram(s) Oral daily  senna 2 Tablet(s) Oral at bedtime  sodium chloride 0.9%. 1000 milliLiter(s) (10 mL/Hr) IV Continuous <Continuous>    MEDICATIONS  (PRN):  acetaminophen   Tablet .. 650 milliGRAM(s) Oral every 6 hours PRN Mild Pain (1 - 3)  oxyCODONE    IR 10 milliGRAM(s) Oral every 4 hours PRN Severe Pain (7 - 10)  oxyCODONE    IR 5 milliGRAM(s) Oral every 4 hours PRN Moderate Pain (4 - 6)        RADIOLOGY & ADDITIONAL TESTS:    < from: Xray Chest 1 View- PORTABLE-Routine (02.19.21 @ 04:49) >    Right internal jugular line  mediastinal drain left chest tube reidentified. Left basilar atelectasis similar to prior. No pneumothorax pleural effusion or other new abnormality.    < end of copied text >

## 2021-02-20 DIAGNOSIS — R41.0 DISORIENTATION, UNSPECIFIED: ICD-10-CM

## 2021-02-20 LAB
ANION GAP SERPL CALC-SCNC: 10 MMOL/L — SIGNIFICANT CHANGE UP (ref 5–17)
BUN SERPL-MCNC: 17 MG/DL — SIGNIFICANT CHANGE UP (ref 7–23)
CALCIUM SERPL-MCNC: 9 MG/DL — SIGNIFICANT CHANGE UP (ref 8.4–10.5)
CHLORIDE SERPL-SCNC: 107 MMOL/L — SIGNIFICANT CHANGE UP (ref 96–108)
CO2 SERPL-SCNC: 24 MMOL/L — SIGNIFICANT CHANGE UP (ref 22–31)
CREAT SERPL-MCNC: 0.55 MG/DL — SIGNIFICANT CHANGE UP (ref 0.5–1.3)
GLUCOSE BLDC GLUCOMTR-MCNC: 131 MG/DL — HIGH (ref 70–99)
GLUCOSE BLDC GLUCOMTR-MCNC: 161 MG/DL — HIGH (ref 70–99)
GLUCOSE BLDC GLUCOMTR-MCNC: 166 MG/DL — HIGH (ref 70–99)
GLUCOSE BLDC GLUCOMTR-MCNC: 203 MG/DL — HIGH (ref 70–99)
GLUCOSE BLDC GLUCOMTR-MCNC: 251 MG/DL — HIGH (ref 70–99)
GLUCOSE BLDC GLUCOMTR-MCNC: 43 MG/DL — CRITICAL LOW (ref 70–99)
GLUCOSE BLDC GLUCOMTR-MCNC: 44 MG/DL — CRITICAL LOW (ref 70–99)
GLUCOSE SERPL-MCNC: 257 MG/DL — HIGH (ref 70–99)
HCT VFR BLD CALC: 32 % — LOW (ref 34.5–45)
HGB BLD-MCNC: 10.3 G/DL — LOW (ref 11.5–15.5)
MAGNESIUM SERPL-MCNC: 2 MG/DL — SIGNIFICANT CHANGE UP (ref 1.6–2.6)
MCHC RBC-ENTMCNC: 29.4 PG — SIGNIFICANT CHANGE UP (ref 27–34)
MCHC RBC-ENTMCNC: 32.2 GM/DL — SIGNIFICANT CHANGE UP (ref 32–36)
MCV RBC AUTO: 91.4 FL — SIGNIFICANT CHANGE UP (ref 80–100)
NRBC # BLD: 0 /100 WBCS — SIGNIFICANT CHANGE UP (ref 0–0)
PLATELET # BLD AUTO: 172 K/UL — SIGNIFICANT CHANGE UP (ref 150–400)
POTASSIUM SERPL-MCNC: 3.9 MMOL/L — SIGNIFICANT CHANGE UP (ref 3.5–5.3)
POTASSIUM SERPL-SCNC: 3.9 MMOL/L — SIGNIFICANT CHANGE UP (ref 3.5–5.3)
RBC # BLD: 3.5 M/UL — LOW (ref 3.8–5.2)
RBC # FLD: 14.1 % — SIGNIFICANT CHANGE UP (ref 10.3–14.5)
SODIUM SERPL-SCNC: 141 MMOL/L — SIGNIFICANT CHANGE UP (ref 135–145)
WBC # BLD: 12.61 K/UL — HIGH (ref 3.8–10.5)
WBC # FLD AUTO: 12.61 K/UL — HIGH (ref 3.8–10.5)

## 2021-02-20 PROCEDURE — 71045 X-RAY EXAM CHEST 1 VIEW: CPT | Mod: 26

## 2021-02-20 RX ORDER — INSULIN LISPRO 100/ML
11 VIAL (ML) SUBCUTANEOUS
Refills: 0 | Status: DISCONTINUED | OUTPATIENT
Start: 2021-02-20 | End: 2021-02-21

## 2021-02-20 RX ORDER — INSULIN GLARGINE 100 [IU]/ML
30 INJECTION, SOLUTION SUBCUTANEOUS AT BEDTIME
Refills: 0 | Status: DISCONTINUED | OUTPATIENT
Start: 2021-02-20 | End: 2021-02-23

## 2021-02-20 RX ORDER — DEXTROSE 50 % IN WATER 50 %
25 SYRINGE (ML) INTRAVENOUS ONCE
Refills: 0 | Status: COMPLETED | OUTPATIENT
Start: 2021-02-20 | End: 2021-02-20

## 2021-02-20 RX ORDER — METOPROLOL TARTRATE 50 MG
50 TABLET ORAL EVERY 8 HOURS
Refills: 0 | Status: DISCONTINUED | OUTPATIENT
Start: 2021-02-20 | End: 2021-02-23

## 2021-02-20 RX ORDER — INSULIN GLARGINE 100 [IU]/ML
25 INJECTION, SOLUTION SUBCUTANEOUS AT BEDTIME
Refills: 0 | Status: DISCONTINUED | OUTPATIENT
Start: 2021-02-20 | End: 2021-02-20

## 2021-02-20 RX ORDER — INSULIN GLARGINE 100 [IU]/ML
36 INJECTION, SOLUTION SUBCUTANEOUS AT BEDTIME
Refills: 0 | Status: DISCONTINUED | OUTPATIENT
Start: 2021-02-20 | End: 2021-02-20

## 2021-02-20 RX ORDER — INSULIN LISPRO 100/ML
11 VIAL (ML) SUBCUTANEOUS
Refills: 0 | Status: DISCONTINUED | OUTPATIENT
Start: 2021-02-20 | End: 2021-02-20

## 2021-02-20 RX ORDER — INSULIN LISPRO 100/ML
15 VIAL (ML) SUBCUTANEOUS
Refills: 0 | Status: DISCONTINUED | OUTPATIENT
Start: 2021-02-20 | End: 2021-02-20

## 2021-02-20 RX ORDER — POTASSIUM CHLORIDE 20 MEQ
20 PACKET (EA) ORAL ONCE
Refills: 0 | Status: COMPLETED | OUTPATIENT
Start: 2021-02-20 | End: 2021-02-20

## 2021-02-20 RX ADMIN — Medication 100 MICROGRAM(S): at 05:07

## 2021-02-20 RX ADMIN — Medication 9 UNIT(S): at 08:32

## 2021-02-20 RX ADMIN — Medication 11 UNIT(S): at 13:18

## 2021-02-20 RX ADMIN — Medication 25 MILLIGRAM(S): at 13:19

## 2021-02-20 RX ADMIN — Medication 81 MILLIGRAM(S): at 13:20

## 2021-02-20 RX ADMIN — DORZOLAMIDE HYDROCHLORIDE 1 DROP(S): 20 SOLUTION/ DROPS OPHTHALMIC at 08:34

## 2021-02-20 RX ADMIN — CLOPIDOGREL BISULFATE 75 MILLIGRAM(S): 75 TABLET, FILM COATED ORAL at 13:19

## 2021-02-20 RX ADMIN — Medication 6: at 13:19

## 2021-02-20 RX ADMIN — Medication 50 MILLIGRAM(S): at 21:24

## 2021-02-20 RX ADMIN — Medication 4: at 08:33

## 2021-02-20 RX ADMIN — ATORVASTATIN CALCIUM 40 MILLIGRAM(S): 80 TABLET, FILM COATED ORAL at 21:24

## 2021-02-20 RX ADMIN — HALOPERIDOL DECANOATE 5 MILLIGRAM(S): 100 INJECTION INTRAMUSCULAR at 03:00

## 2021-02-20 RX ADMIN — Medication 20 MILLIEQUIVALENT(S): at 16:20

## 2021-02-20 RX ADMIN — ENOXAPARIN SODIUM 40 MILLIGRAM(S): 100 INJECTION SUBCUTANEOUS at 13:19

## 2021-02-20 RX ADMIN — INSULIN GLARGINE 30 UNIT(S): 100 INJECTION, SOLUTION SUBCUTANEOUS at 21:27

## 2021-02-20 RX ADMIN — Medication 25 GRAM(S): at 17:25

## 2021-02-20 RX ADMIN — Medication 25 MILLIGRAM(S): at 05:07

## 2021-02-20 RX ADMIN — POLYETHYLENE GLYCOL 3350 17 GRAM(S): 17 POWDER, FOR SOLUTION ORAL at 13:18

## 2021-02-20 RX ADMIN — CHLORHEXIDINE GLUCONATE 1 APPLICATION(S): 213 SOLUTION TOPICAL at 05:10

## 2021-02-20 NOTE — PROVIDER CONTACT NOTE (CRITICAL VALUE NOTIFICATION) - NAME OF MD/NP/PA/DO NOTIFIED:
Girish Aj, NP
Dilma Ellington, NP
Chayo Richardson NP
Dilma Ellington, NP

## 2021-02-20 NOTE — PROVIDER CONTACT NOTE (CRITICAL VALUE NOTIFICATION) - SITUATION
Supra therapeutic appt of 200^^.
aptt >200
elevated appt of 124.3 reported by lab.
elevated appt of 167.7
aptt >200
fingerstick :44

## 2021-02-20 NOTE — PROVIDER CONTACT NOTE (CRITICAL VALUE NOTIFICATION) - BACKGROUND
pt. adm. w/ +NSTEMI & chest pain, 2/11: cath revealing MVD
Pt. is pre op for OHSx. On heparin drip for CP.
pt. adm. w/ +NSTEMI & chest pain, 2/11: cath revealing MVD
pt. pre op for OHS, on heparin drip for CP
Pt. being anticoagulated for pre op chest pain.
Patient s/p C3L from 2/17 with post op confusion.

## 2021-02-20 NOTE — PROVIDER CONTACT NOTE (CRITICAL VALUE NOTIFICATION) - ASSESSMENT
no s/s of bleeding, pt stable
pt. asymptomatic of result.
pt. asymptomatic of result.
Patient asymptomatic at first, then stated that she felt hot . Pt. diaphoretic to touch
no s/s of bleeding, pt stable
pt. asymptomatic of lab result.

## 2021-02-20 NOTE — PROGRESS NOTE ADULT - SUBJECTIVE AND OBJECTIVE BOX
VITAL SIGNS    Subjective: "I want to go home" Denies CP, palpitation, SOB, PONCE, HA, dizziness, N/V/D, fever or chills.  No acute event noted overnight.     Telemetry: -120      Vital Signs Last 24 Hrs  T(C): 36.8 (02-20-21 @ 15:45), Max: 36.8 (02-20-21 @ 07:38)  T(F): 98.2 (02-20-21 @ 15:45), Max: 98.2 (02-20-21 @ 07:38)  HR: 99 (02-20-21 @ 15:45) (98 - 117)  BP: 135/71 (02-20-21 @ 15:45) (103/51 - 146/71)  RR: 18 (02-20-21 @ 15:45) (18 - 18)  SpO2: 96% (02-20-21 @ 15:45) (94% - 96%)           02-19 @ 07:01  -  02-20 @ 07:00  --------------------------------------------------------  IN: 695 mL / OUT: 1400 mL / NET: -705 mL    02-20 @ 07:01  -  02-20 @ 16:16  --------------------------------------------------------  IN: 480 mL / OUT: 1000 mL / NET: -520 mL    CAPILLARY BLOOD GLUCOSE    POCT Blood Glucose.: 251 mg/dL (20 Feb 2021 12:56)  POCT Blood Glucose.: 203 mg/dL (20 Feb 2021 07:55)  POCT Blood Glucose.: 219 mg/dL (19 Feb 2021 22:02)  POCT Blood Glucose.: 249 mg/dL (19 Feb 2021 19:44)  POCT Blood Glucose.: 107 mg/dL (19 Feb 2021 16:48)        PHYSICAL EXAM    Neurology: alert and oriented x 2, nonfocal, no gross deficits. Intermittent confusion     CV: (+) S1 and S2, No murmurs, rubs, gallops or clicks     Sternal Wound: MSI -->CDI , sternum stable; PW x 2 --> Isolated     Lungs: B/L Diminished     Abdomen: soft, nontender, nondistended, positive bowel sounds, (+) Flatus; (+) BM     :  Voiding               Extremities:  B/L LE (+) trace edema; negative calf tenderness; (+) 2 DP palpable        acetaminophen Tablet .. 650 milliGRAM(s) Oral every 6 hours PRN  aspirin enteric coated 81 milliGRAM(s) Oral daily  atorvastatin 40 milliGRAM(s) Oral at bedtime  chlorhexidine 2% Cloths 1 Application(s) Topical <User Schedule>  clopidogrel Tablet 75 milliGRAM(s) Oral daily  dorzolamide 2% Ophthalmic Solution 1 Drop(s) Both EYES <User Schedule>  enoxaparin Injectable 40 milliGRAM(s) SubCutaneous daily  glucagon  Injectable 1 milliGRAM(s) IntraMuscular once  insulin glargine Injectable (LANTUS) 36 Unit(s) SubCutaneous at bedtime  insulin lispro (ADMELOG) corrective regimen sliding scale SubCutaneous three times a day before meals  insulin lispro (ADMELOG) corrective regimen sliding scale SubCutaneous at bedtime  insulin lispro Injectable (ADMELOG) 15 Unit(s) SubCutaneous three times a day before meals  insulin regular Infusion 2 Unit(s)/Hr IV Continuous <Continuous>  levothyroxine 100 MICROGram(s) Oral daily  metoprolol tartrate 25 milliGRAM(s) Oral every 8 hours  oxyCODONE IR 10 milliGRAM(s) Oral every 4 hours PRN  oxyCODONE  IR 5 milliGRAM(s) Oral every 4 hours PRN  pantoprazole Tablet 40 milliGRAM(s) Oral before breakfast  polyethylene glycol 3350 17 Gram(s) Oral daily  potassium chloride  Tablet ER 20 milliEquivalent(s) Oral once  senna 2 Tablet(s) Oral at bedtime  sodium chloride 0.9%. 1000 milliLiter(s) IV Continuous <Continuous>    Physical Therapy Rec:   Home  [  ]   Home w/ PT  [ X ]  Rehab  [  ]    Discussed with Cardiothoracic Team at AM rounds.

## 2021-02-20 NOTE — PROGRESS NOTE ADULT - SUBJECTIVE AND OBJECTIVE BOX
Chief complaint  Patient is a 70y old  Female who presents with a chief complaint of Cardiac Surgery (20 Feb 2021 16:15)   Review of systems  Patient in bed, appears comfortable.    Labs and Fingersticks  CAPILLARY BLOOD GLUCOSE    POCT Blood Glucose.: 161 mg/dL (20 Feb 2021 18:29)  POCT Blood Glucose.: 131 mg/dL (20 Feb 2021 17:36)  POCT Blood Glucose.: 44 mg/dL (20 Feb 2021 17:23)  POCT Blood Glucose.: 43 mg/dL (20 Feb 2021 17:20)  POCT Blood Glucose.: 251 mg/dL (20 Feb 2021 12:56)  POCT Blood Glucose.: 203 mg/dL (20 Feb 2021 07:55)  POCT Blood Glucose.: 219 mg/dL (19 Feb 2021 22:02)      Anion Gap, Serum: 10 (02-20 @ 13:24)  Anion Gap, Serum: 11 (02-19 @ 05:40)      Calcium, Total Serum: 9.0 (02-20 @ 13:24)  Calcium, Total Serum: 8.5 (02-19 @ 05:40)  Albumin, Serum: 3.3 (02-19 @ 05:40)    Alanine Aminotransferase (ALT/SGPT): 21 (02-19 @ 05:40)  Alkaline Phosphatase, Serum: 48 (02-19 @ 05:40)  Aspartate Aminotransferase (AST/SGOT): 34 (02-19 @ 05:40)        02-20    141  |  107  |  17  ----------------------------<  257<H>  3.9   |  24  |  0.55    Ca    9.0      20 Feb 2021 13:24  Phos  2.4     02-19  Mg     2.0     02-20    TPro  6.1  /  Alb  3.3  /  TBili  0.3  /  DBili  x   /  AST  34  /  ALT  21  /  AlkPhos  48  02-19                        10.3   12.61 )-----------( 172      ( 20 Feb 2021 13:24 )             32.0     Medications  MEDICATIONS  (STANDING):  aspirin enteric coated 81 milliGRAM(s) Oral daily  atorvastatin 40 milliGRAM(s) Oral at bedtime  chlorhexidine 2% Cloths 1 Application(s) Topical <User Schedule>  clopidogrel Tablet 75 milliGRAM(s) Oral daily  dextrose 40% Gel 15 Gram(s) Oral once  dextrose 50% Injectable 25 Gram(s) IV Push once  dorzolamide 2% Ophthalmic Solution 1 Drop(s) Both EYES <User Schedule>  enoxaparin Injectable 40 milliGRAM(s) SubCutaneous daily  glucagon  Injectable 1 milliGRAM(s) IntraMuscular once  insulin glargine Injectable (LANTUS) 30 Unit(s) SubCutaneous at bedtime  insulin lispro (ADMELOG) corrective regimen sliding scale   SubCutaneous three times a day before meals  insulin lispro (ADMELOG) corrective regimen sliding scale   SubCutaneous at bedtime  insulin lispro Injectable (ADMELOG) 11 Unit(s) SubCutaneous three times a day before meals  insulin regular Infusion 2 Unit(s)/Hr (2 mL/Hr) IV Continuous <Continuous>  levothyroxine 100 MICROGram(s) Oral daily  metoprolol tartrate 50 milliGRAM(s) Oral every 8 hours  pantoprazole    Tablet 40 milliGRAM(s) Oral before breakfast  polyethylene glycol 3350 17 Gram(s) Oral daily  senna 2 Tablet(s) Oral at bedtime  sodium chloride 0.9%. 1000 milliLiter(s) (10 mL/Hr) IV Continuous <Continuous>      Physical Exam  General: Patient comfortable in bed  Vital Signs Last 12 Hrs  T(F): 97.6 (02-20-21 @ 19:06), Max: 98.2 (02-20-21 @ 11:14)  HR: 100 (02-20-21 @ 19:06) (99 - 107)  BP: 129/61 (02-20-21 @ 19:06) (129/61 - 135/71)  BP(mean): 88 (02-20-21 @ 19:05) (88 - 95)  RR: 18 (02-20-21 @ 19:06) (18 - 18)  SpO2: 97% (02-20-21 @ 19:06) (96% - 98%)  Neck: No palpable thyroid nodules.  CVS: S1S2, No murmurs  Respiratory: No wheezing, no crepitations  GI: Abdomen soft, bowel sounds positive  Musculoskeletal:  edema lower extremities.     Diagnostics    Free Thyroxine, Serum: AM Sched. Collection: 14-Feb-2021 06:00  Cancel Reason: Dup Cancel (02-13 @ 13:26)  Thyroid Stimulating Hormone, Serum: AM Sched. Collection: 14-Feb-2021 06:00  Cancel Reason: QNS (02-13 @ 13:26)  Free Thyroxine, Serum: AM Sched. Collection: 14-Feb-2021 06:00  Cancel Reason: AMANDA (02-13 @ 10:13)

## 2021-02-20 NOTE — PROGRESS NOTE ADULT - PROBLEM SELECTOR PLAN 2
Endo following for uncontrolled dm2- A1C 9.2;   Insulin gtt D/C’d   Started on Lantus 36 units SQ HS and Admelog 15 units SQ TID   Continue with DASH/ diabetic diet.   Accuchecks 4 times a day AC and HS with Admelog corrective scale

## 2021-02-20 NOTE — PROGRESS NOTE ADULT - PROBLEM SELECTOR PLAN 1
Continue with ASA 81 mg PO Daily and Plavix 75 mg PO daily   Increase Lopressor 50 mg PO TID.   Continue with Atorvastatin 40 mg PO HS   Increase activity as tolerated.   Encourage Chest PT / Pulmonary toileting and Incentive spirometry every 1 hour x 10 while awake.   Continue with PUD and DVT prophylaxis.   Shower on POD #5.   Maintain PW x 2 --> Isolated   D/C plan home PT once medically cleared   Plan of care discussed with attending

## 2021-02-20 NOTE — PROGRESS NOTE ADULT - ASSESSMENT
Assessment  DMT2: 70y Female with DM T2 with hyperglycemia admitted with chest pain, A1C 9.2%, was on insulin at home, postop on basal bolus insulin, blood sugars fluctuating due to inconsistent carb intake, had hypoglycemic events, eating meals.  CAD: CABG 2/17, on medications, monitored, stable.  Hypothyroidism: on Synthroid 100 mcg po daily, compliant with Synthroid intake, subclinical hypothyroid, FT4 1.4.  Overweight: No strict exercise routines, neither on low calorie diet.        Flor Torres MD  Cell: [phone]  Office: [phone]

## 2021-02-20 NOTE — PROVIDER CONTACT NOTE (CRITICAL VALUE NOTIFICATION) - ACTION/TREATMENT ORDERED:
BEREKET Stout made aware
decrease heparin drip rate to 5cc/hr.
instructed to NOT hold heparin gtt. due to previous chest pain but to decrease the gtt. to 10.5 cc/hr
lower drip rate to 6.5cc/hr
instructed to NOT hold heparin gtt. due to previous chest pain but to decrease the heparin gtt. from 10.5 cc/hr to 9cc/hr
Hold heparin for 1 hour and restart at 12cc/hr.

## 2021-02-20 NOTE — PROGRESS NOTE ADULT - PROBLEM SELECTOR PLAN 1
Will decrease Lantus to 30 units at bed time.  Will decrease Admelog to 11 units before each meal in addition to Admelog correction scale coverage.  Patient counseled for compliance with consistent low carb diet.

## 2021-02-21 LAB
ANION GAP SERPL CALC-SCNC: 10 MMOL/L — SIGNIFICANT CHANGE UP (ref 5–17)
BUN SERPL-MCNC: 14 MG/DL — SIGNIFICANT CHANGE UP (ref 7–23)
CALCIUM SERPL-MCNC: 9.1 MG/DL — SIGNIFICANT CHANGE UP (ref 8.4–10.5)
CHLORIDE SERPL-SCNC: 106 MMOL/L — SIGNIFICANT CHANGE UP (ref 96–108)
CO2 SERPL-SCNC: 23 MMOL/L — SIGNIFICANT CHANGE UP (ref 22–31)
CREAT SERPL-MCNC: 0.62 MG/DL — SIGNIFICANT CHANGE UP (ref 0.5–1.3)
GLUCOSE BLDC GLUCOMTR-MCNC: 107 MG/DL — HIGH (ref 70–99)
GLUCOSE BLDC GLUCOMTR-MCNC: 134 MG/DL — HIGH (ref 70–99)
GLUCOSE BLDC GLUCOMTR-MCNC: 193 MG/DL — HIGH (ref 70–99)
GLUCOSE BLDC GLUCOMTR-MCNC: 202 MG/DL — HIGH (ref 70–99)
GLUCOSE BLDC GLUCOMTR-MCNC: 54 MG/DL — CRITICAL LOW (ref 70–99)
GLUCOSE BLDC GLUCOMTR-MCNC: 95 MG/DL — SIGNIFICANT CHANGE UP (ref 70–99)
GLUCOSE SERPL-MCNC: 178 MG/DL — HIGH (ref 70–99)
HCT VFR BLD CALC: 32.7 % — LOW (ref 34.5–45)
HGB BLD-MCNC: 10.5 G/DL — LOW (ref 11.5–15.5)
MCHC RBC-ENTMCNC: 29.6 PG — SIGNIFICANT CHANGE UP (ref 27–34)
MCHC RBC-ENTMCNC: 32.1 GM/DL — SIGNIFICANT CHANGE UP (ref 32–36)
MCV RBC AUTO: 92.1 FL — SIGNIFICANT CHANGE UP (ref 80–100)
NRBC # BLD: 0 /100 WBCS — SIGNIFICANT CHANGE UP (ref 0–0)
PLATELET # BLD AUTO: 213 K/UL — SIGNIFICANT CHANGE UP (ref 150–400)
POTASSIUM SERPL-MCNC: 4.1 MMOL/L — SIGNIFICANT CHANGE UP (ref 3.5–5.3)
POTASSIUM SERPL-SCNC: 4.1 MMOL/L — SIGNIFICANT CHANGE UP (ref 3.5–5.3)
RBC # BLD: 3.55 M/UL — LOW (ref 3.8–5.2)
RBC # FLD: 14.1 % — SIGNIFICANT CHANGE UP (ref 10.3–14.5)
SODIUM SERPL-SCNC: 139 MMOL/L — SIGNIFICANT CHANGE UP (ref 135–145)
WBC # BLD: 13.76 K/UL — HIGH (ref 3.8–10.5)
WBC # FLD AUTO: 13.76 K/UL — HIGH (ref 3.8–10.5)

## 2021-02-21 RX ORDER — INSULIN LISPRO 100/ML
6 VIAL (ML) SUBCUTANEOUS ONCE
Refills: 0 | Status: COMPLETED | OUTPATIENT
Start: 2021-02-21 | End: 2021-02-21

## 2021-02-21 RX ORDER — INSULIN LISPRO 100/ML
8 VIAL (ML) SUBCUTANEOUS
Refills: 0 | Status: DISCONTINUED | OUTPATIENT
Start: 2021-02-21 | End: 2021-02-23

## 2021-02-21 RX ADMIN — Medication 2: at 08:13

## 2021-02-21 RX ADMIN — Medication 100 MICROGRAM(S): at 06:10

## 2021-02-21 RX ADMIN — Medication 650 MILLIGRAM(S): at 06:10

## 2021-02-21 RX ADMIN — Medication 6 UNIT(S): at 14:07

## 2021-02-21 RX ADMIN — CHLORHEXIDINE GLUCONATE 1 APPLICATION(S): 213 SOLUTION TOPICAL at 04:52

## 2021-02-21 RX ADMIN — SENNA PLUS 2 TABLET(S): 8.6 TABLET ORAL at 21:08

## 2021-02-21 RX ADMIN — CLOPIDOGREL BISULFATE 75 MILLIGRAM(S): 75 TABLET, FILM COATED ORAL at 12:20

## 2021-02-21 RX ADMIN — Medication 50 MILLIGRAM(S): at 06:10

## 2021-02-21 RX ADMIN — Medication 11 UNIT(S): at 08:13

## 2021-02-21 RX ADMIN — PANTOPRAZOLE SODIUM 40 MILLIGRAM(S): 20 TABLET, DELAYED RELEASE ORAL at 06:10

## 2021-02-21 RX ADMIN — Medication 81 MILLIGRAM(S): at 12:20

## 2021-02-21 RX ADMIN — DORZOLAMIDE HYDROCHLORIDE 1 DROP(S): 20 SOLUTION/ DROPS OPHTHALMIC at 09:01

## 2021-02-21 RX ADMIN — INSULIN GLARGINE 30 UNIT(S): 100 INJECTION, SOLUTION SUBCUTANEOUS at 22:26

## 2021-02-21 RX ADMIN — ENOXAPARIN SODIUM 40 MILLIGRAM(S): 100 INJECTION SUBCUTANEOUS at 12:20

## 2021-02-21 RX ADMIN — ATORVASTATIN CALCIUM 40 MILLIGRAM(S): 80 TABLET, FILM COATED ORAL at 21:08

## 2021-02-21 RX ADMIN — Medication 50 MILLIGRAM(S): at 13:08

## 2021-02-21 RX ADMIN — Medication 8 UNIT(S): at 17:13

## 2021-02-21 RX ADMIN — Medication 50 MILLIGRAM(S): at 21:08

## 2021-02-21 RX ADMIN — DORZOLAMIDE HYDROCHLORIDE 1 DROP(S): 20 SOLUTION/ DROPS OPHTHALMIC at 21:08

## 2021-02-21 RX ADMIN — Medication 650 MILLIGRAM(S): at 18:34

## 2021-02-21 NOTE — PROGRESS NOTE ADULT - PROBLEM SELECTOR PLAN 2
Endo following for uncontrolled dm2- A1C 9.2;   Insulin gtt D/C’d   Continue on Insulin Regimen as per Endo  Continue with DASH/ diabetic diet.   Accuchecks 4 times a day AC and HS with Admelog corrective scale

## 2021-02-21 NOTE — PROGRESS NOTE ADULT - SUBJECTIVE AND OBJECTIVE BOX
VITAL SIGNS    Telemetry: SR 80-90  Vital Signs Last 24 Hrs  T(C): 36.7 (21 @ 07:39), Max: 36.8 (21 @ 11:14)  T(F): 98 (21 @ 07:39), Max: 98.2 (21 @ 11:14)  HR: 77 (21 @ 07:39) (77 - 117)  BP: 111/64 (21 @ 07:39) (111/64 - 135/71)  RR: 18 (21 @ 07:39) (18 - 18)  SpO2: 97% (21 @ 07:39) (96% - 98%)             07:01  -   07:00  --------------------------------------------------------  IN: 1100 mL / OUT: 1900 mL / NET: -800 mL       Daily     Daily Weight in k.7 (2021 04:00)  Admit Wt: Drug Dosing Weight  Height (cm): 151 (2021 07:16)  Weight (kg): 74.8 (2021 07:16)  BMI (kg/m2): 32.8 (2021 07:16)  BSA (m2): 1.71 (2021 07:16)      CAPILLARY BLOOD GLUCOSE      POCT Blood Glucose.: 193 mg/dL (2021 07:57)  POCT Blood Glucose.: 166 mg/dL (2021 21:13)  POCT Blood Glucose.: 161 mg/dL (2021 18:29)  POCT Blood Glucose.: 131 mg/dL (2021 17:36)  POCT Blood Glucose.: 44 mg/dL (2021 17:23)  POCT Blood Glucose.: 43 mg/dL (2021 17:20)  POCT Blood Glucose.: 251 mg/dL (2021 12:56)          MEDICATIONS  acetaminophen   Tablet .. 650 milliGRAM(s) Oral every 6 hours PRN  aspirin enteric coated 81 milliGRAM(s) Oral daily  atorvastatin 40 milliGRAM(s) Oral at bedtime  chlorhexidine 2% Cloths 1 Application(s) Topical <User Schedule>  clopidogrel Tablet 75 milliGRAM(s) Oral daily  dextrose 40% Gel 15 Gram(s) Oral once  dextrose 50% Injectable 25 Gram(s) IV Push once  dorzolamide 2% Ophthalmic Solution 1 Drop(s) Both EYES <User Schedule>  enoxaparin Injectable 40 milliGRAM(s) SubCutaneous daily  glucagon  Injectable 1 milliGRAM(s) IntraMuscular once  insulin glargine Injectable (LANTUS) 30 Unit(s) SubCutaneous at bedtime  insulin lispro (ADMELOG) corrective regimen sliding scale   SubCutaneous three times a day before meals  insulin lispro (ADMELOG) corrective regimen sliding scale   SubCutaneous at bedtime  insulin lispro Injectable (ADMELOG) 11 Unit(s) SubCutaneous three times a day before meals  insulin regular Infusion 2 Unit(s)/Hr IV Continuous <Continuous>  levothyroxine 100 MICROGram(s) Oral daily  metoprolol tartrate 50 milliGRAM(s) Oral every 8 hours  oxyCODONE    IR 5 milliGRAM(s) Oral every 4 hours PRN  oxyCODONE    IR 10 milliGRAM(s) Oral every 4 hours PRN  pantoprazole    Tablet 40 milliGRAM(s) Oral before breakfast  polyethylene glycol 3350 17 Gram(s) Oral daily  senna 2 Tablet(s) Oral at bedtime  sodium chloride 0.9%. 1000 milliLiter(s) IV Continuous <Continuous>      >>> <<<  PHYSICAL EXAM  Subjective: " Ilia"  Neurology: alert and oriented x 3, nonfocal, no gross deficits  CV : RRR S1S2, no murmurs, rubs or gallops   Sternal Wound :  CDI , Stable  Lungs: CTA B/L, No wheezing, rales, rhonchi. Non labored respirations   Abdomen: soft, NT,ND, ( )BM  :  voiding  Extremities: No LE edema bilaterally, +DP, No calf tenderness     LABS      139  |  106  |  14  ----------------------------<  178<H>  4.1   |  23  |  0.62    Ca    9.1      2021 05:03  Mg     2.0     02-20                                   10.5   13.76 )-----------( 213      ( 2021 05:03 )             32.7                 PAST MEDICAL & SURGICAL HISTORY:  HLD (hyperlipidemia)    Hypothyroidism    DM (diabetes mellitus)    HTN (hypertension)    S/P [id number] (total abdominal hysterectomy)

## 2021-02-21 NOTE — PROGRESS NOTE ADULT - PROBLEM SELECTOR PLAN 1
Will decrease Admelog to 8 units before each meal in addition to Admelog correction scale coverage.  Patient counseled for compliance with consistent low carb diet.

## 2021-02-21 NOTE — PROGRESS NOTE ADULT - ASSESSMENT
This is a 69 y/o female with significant PMHx of former 5 pack year cigarette smoker, T2DM (HgA1c 9.2 this admission) managed by endocrinologist in [place], denies complications), hypothyroidism, HTN, HLD admitted to [facility] on 2/8/2021 with NSTEMI and transferred to [facility] 2/11/21 for LHC with Dr. Duggan.  LHC revealed 80-90% stenosis to LAD, 80-90% stenosis to prox diag, 80-90% to prox Cx, 70-80 to mid/distal RCA. CTS consult called to evaluate patient for cardiac surgery.  She is now POD #2 from CABGx3.    2/12 Pre op Cardiac Surgery in progress. D/C Bril[place].  P2Y12 94   2/13 VSS; rsr 60-70; stable at this time echo neg; carotids neg; repeat P2y12 in am; endo consulted for uncontrolled dm2- A1C 9.2; pt placed on lantus and admelog   continue asa/ statin/ b-blockers; hep gttp; ARB d/c preop   2/14 VSS - no c/o cp this am - maintain hep gtt & bb-will inc. to 50mg po bid-or tue  2/15 VSS- no chest pain today - continue on hep gtt  2/16 VSS- No chest pain- continue on hep gtt. Preop workup completed. Plan for CABG with Dr. Adhikari tomorrow.   2/17 s /p CABG, with JUAN CARLOS , LIMA-LAD, SVG-RCA, SVG-Circ   Post op hypotension, ivf, pressors  Extubated d 0  Hyperglycemia  insulin infusion  MS/ L pl remain in place  Transferred to sdu  2/19 Med and Lt pleural CT removed, CXR stable.  F/U TOV.  Beta blocker inc'd 25 BID to TID for . FS in 300's, insulin gtt restarted.    2/20 VVS; Continue with current medication regimen.  Insulin gtt d/c 2/2 increase agitation and confusion overnight  Patient continues hyperglycemic --> Lantus increased to 36 units and Admelog increased to 15 units SQ TID pre meal. K+ supplement.  Continue on a 1:1 for safety precautions. Lopressor increased to 50 mg PO TID.   Dispo: Home candidate.    2/21 VSS; Plan to move to floor, monitor till noon- plan to d'c 1:1 if no agitation or confusion.

## 2021-02-21 NOTE — PROGRESS NOTE ADULT - SUBJECTIVE AND OBJECTIVE BOX
Chief complaint  Patient is a 70y old  Female who presents with a chief complaint of C3L (21 Feb 2021 09:40)   Review of systems  Patient in bed, appears comfortable.    Labs and Fingersticks  CAPILLARY BLOOD GLUCOSE      POCT Blood Glucose.: 134 mg/dL (21 Feb 2021 16:57)  POCT Blood Glucose.: 202 mg/dL (21 Feb 2021 13:11)  POCT Blood Glucose.: 95 mg/dL (21 Feb 2021 12:05)  POCT Blood Glucose.: 54 mg/dL (21 Feb 2021 11:42)  POCT Blood Glucose.: 193 mg/dL (21 Feb 2021 07:57)  POCT Blood Glucose.: 166 mg/dL (20 Feb 2021 21:13)  POCT Blood Glucose.: 161 mg/dL (20 Feb 2021 18:29)  POCT Blood Glucose.: 131 mg/dL (20 Feb 2021 17:36)  POCT Blood Glucose.: 44 mg/dL (20 Feb 2021 17:23)  POCT Blood Glucose.: 43 mg/dL (20 Feb 2021 17:20)      Anion Gap, Serum: 10 (02-21 @ 05:03)  Anion Gap, Serum: 10 (02-20 @ 13:24)      Calcium, Total Serum: 9.1 (02-21 @ 05:03)  Calcium, Total Serum: 9.0 (02-20 @ 13:24)          02-21    139  |  106  |  14  ----------------------------<  178<H>  4.1   |  23  |  0.62    Ca    9.1      21 Feb 2021 05:03  Mg     2.0     02-20                          10.5   13.76 )-----------( 213      ( 21 Feb 2021 05:03 )             32.7     Medications  MEDICATIONS  (STANDING):  aspirin enteric coated 81 milliGRAM(s) Oral daily  atorvastatin 40 milliGRAM(s) Oral at bedtime  chlorhexidine 2% Cloths 1 Application(s) Topical <User Schedule>  clopidogrel Tablet 75 milliGRAM(s) Oral daily  dextrose 40% Gel 15 Gram(s) Oral once  dextrose 50% Injectable 25 Gram(s) IV Push once  dorzolamide 2% Ophthalmic Solution 1 Drop(s) Both EYES <User Schedule>  enoxaparin Injectable 40 milliGRAM(s) SubCutaneous daily  glucagon  Injectable 1 milliGRAM(s) IntraMuscular once  insulin glargine Injectable (LANTUS) 30 Unit(s) SubCutaneous at bedtime  insulin lispro (ADMELOG) corrective regimen sliding scale   SubCutaneous three times a day before meals  insulin lispro (ADMELOG) corrective regimen sliding scale   SubCutaneous at bedtime  insulin lispro Injectable (ADMELOG) 8 Unit(s) SubCutaneous three times a day before meals  insulin regular Infusion 2 Unit(s)/Hr (2 mL/Hr) IV Continuous <Continuous>  levothyroxine 100 MICROGram(s) Oral daily  metoprolol tartrate 50 milliGRAM(s) Oral every 8 hours  pantoprazole    Tablet 40 milliGRAM(s) Oral before breakfast  polyethylene glycol 3350 17 Gram(s) Oral daily  senna 2 Tablet(s) Oral at bedtime  sodium chloride 0.9%. 1000 milliLiter(s) (10 mL/Hr) IV Continuous <Continuous>      Physical Exam  General: Patient comfortable in bed  Vital Signs Last 12 Hrs  T(F): 98.2 (02-21-21 @ 15:12), Max: 98.2 (02-21-21 @ 15:12)  HR: 86 (02-21-21 @ 15:12) (77 - 86)  BP: 151/70 (02-21-21 @ 15:12) (111/64 - 151/70)  BP(mean): 100 (02-21-21 @ 15:12) (100 - 100)  RR: 18 (02-21-21 @ 15:12) (18 - 18)  SpO2: 97% (02-21-21 @ 15:12) (97% - 97%)  Neck: No palpable thyroid nodules.  CVS: S1S2, No murmurs  Respiratory: No wheezing, no crepitations  GI: Abdomen soft, bowel sounds positive  Musculoskeletal:  edema lower extremities.     Diagnostics    Free Thyroxine, Serum: AM Sched. Collection: 14-Feb-2021 06:00  Cancel Reason: Dup Cancel (02-13 @ 13:26)  Thyroid Stimulating Hormone, Serum: AM Sched. Collection: 14-Feb-2021 06:00  Cancel Reason: QNS (02-13 @ 13:26)  Free Thyroxine, Serum: AM Sched. Collection: 14-Feb-2021 06:00  Cancel Reason: AMANDA (02-13 @ 10:13)

## 2021-02-21 NOTE — PROGRESS NOTE ADULT - ASSESSMENT
Assessment  DMT2: 70y Female with DM T2 with hyperglycemia admitted with chest pain, A1C 9.2%, was on insulin at home, postop on basal bolus insulin, blood sugars fluctuating due to inconsistent carb intake, did not eat full breakfast, had hypoglycemic events, eating meals.  CAD: CABG 2/17, on medications, monitored, stable.  Hypothyroidism: on Synthroid 100 mcg po daily, compliant with Synthroid intake, subclinical hypothyroid, FT4 1.4.  Overweight: No strict exercise routines, neither on low calorie diet.        Flor Torres MD  Cell: [phone]  Office: [phone]

## 2021-02-22 LAB
ALBUMIN SERPL ELPH-MCNC: 2.9 G/DL — LOW (ref 3.3–5)
ALP SERPL-CCNC: 51 U/L — SIGNIFICANT CHANGE UP (ref 40–120)
ALT FLD-CCNC: 15 U/L — SIGNIFICANT CHANGE UP (ref 10–45)
ANION GAP SERPL CALC-SCNC: 11 MMOL/L — SIGNIFICANT CHANGE UP (ref 5–17)
AST SERPL-CCNC: 22 U/L — SIGNIFICANT CHANGE UP (ref 10–40)
BILIRUB SERPL-MCNC: 0.5 MG/DL — SIGNIFICANT CHANGE UP (ref 0.2–1.2)
BUN SERPL-MCNC: 15 MG/DL — SIGNIFICANT CHANGE UP (ref 7–23)
CALCIUM SERPL-MCNC: 9 MG/DL — SIGNIFICANT CHANGE UP (ref 8.4–10.5)
CHLORIDE SERPL-SCNC: 107 MMOL/L — SIGNIFICANT CHANGE UP (ref 96–108)
CO2 SERPL-SCNC: 23 MMOL/L — SIGNIFICANT CHANGE UP (ref 22–31)
CREAT SERPL-MCNC: 0.73 MG/DL — SIGNIFICANT CHANGE UP (ref 0.5–1.3)
GLUCOSE BLDC GLUCOMTR-MCNC: 117 MG/DL — HIGH (ref 70–99)
GLUCOSE BLDC GLUCOMTR-MCNC: 123 MG/DL — HIGH (ref 70–99)
GLUCOSE BLDC GLUCOMTR-MCNC: 67 MG/DL — LOW (ref 70–99)
GLUCOSE BLDC GLUCOMTR-MCNC: 77 MG/DL — SIGNIFICANT CHANGE UP (ref 70–99)
GLUCOSE BLDC GLUCOMTR-MCNC: 91 MG/DL — SIGNIFICANT CHANGE UP (ref 70–99)
GLUCOSE BLDC GLUCOMTR-MCNC: 94 MG/DL — SIGNIFICANT CHANGE UP (ref 70–99)
GLUCOSE SERPL-MCNC: 93 MG/DL — SIGNIFICANT CHANGE UP (ref 70–99)
HCT VFR BLD CALC: 33.1 % — LOW (ref 34.5–45)
HGB BLD-MCNC: 10.5 G/DL — LOW (ref 11.5–15.5)
MCHC RBC-ENTMCNC: 29.1 PG — SIGNIFICANT CHANGE UP (ref 27–34)
MCHC RBC-ENTMCNC: 31.7 GM/DL — LOW (ref 32–36)
MCV RBC AUTO: 91.7 FL — SIGNIFICANT CHANGE UP (ref 80–100)
NRBC # BLD: 0 /100 WBCS — SIGNIFICANT CHANGE UP (ref 0–0)
PLATELET # BLD AUTO: 247 K/UL — SIGNIFICANT CHANGE UP (ref 150–400)
POTASSIUM SERPL-MCNC: 3.8 MMOL/L — SIGNIFICANT CHANGE UP (ref 3.5–5.3)
POTASSIUM SERPL-SCNC: 3.8 MMOL/L — SIGNIFICANT CHANGE UP (ref 3.5–5.3)
PROT SERPL-MCNC: 5.9 G/DL — LOW (ref 6–8.3)
RBC # BLD: 3.61 M/UL — LOW (ref 3.8–5.2)
RBC # FLD: 13.7 % — SIGNIFICANT CHANGE UP (ref 10.3–14.5)
SARS-COV-2 RNA SPEC QL NAA+PROBE: SIGNIFICANT CHANGE UP
SODIUM SERPL-SCNC: 141 MMOL/L — SIGNIFICANT CHANGE UP (ref 135–145)
WBC # BLD: 10.08 K/UL — SIGNIFICANT CHANGE UP (ref 3.8–10.5)
WBC # FLD AUTO: 10.08 K/UL — SIGNIFICANT CHANGE UP (ref 3.8–10.5)

## 2021-02-22 RX ORDER — SORBITOL SOLUTION 70 %
15 SOLUTION, ORAL MISCELLANEOUS ONCE
Refills: 0 | Status: COMPLETED | OUTPATIENT
Start: 2021-02-22 | End: 2021-02-22

## 2021-02-22 RX ORDER — POTASSIUM CHLORIDE 20 MEQ
20 PACKET (EA) ORAL ONCE
Refills: 0 | Status: COMPLETED | OUTPATIENT
Start: 2021-02-22 | End: 2021-02-22

## 2021-02-22 RX ADMIN — Medication 50 MILLIGRAM(S): at 21:43

## 2021-02-22 RX ADMIN — CLOPIDOGREL BISULFATE 75 MILLIGRAM(S): 75 TABLET, FILM COATED ORAL at 09:07

## 2021-02-22 RX ADMIN — Medication 50 MILLIGRAM(S): at 15:32

## 2021-02-22 RX ADMIN — Medication 50 MILLIGRAM(S): at 05:56

## 2021-02-22 RX ADMIN — DORZOLAMIDE HYDROCHLORIDE 1 DROP(S): 20 SOLUTION/ DROPS OPHTHALMIC at 09:07

## 2021-02-22 RX ADMIN — CHLORHEXIDINE GLUCONATE 1 APPLICATION(S): 213 SOLUTION TOPICAL at 08:13

## 2021-02-22 RX ADMIN — Medication 8 UNIT(S): at 11:49

## 2021-02-22 RX ADMIN — Medication 30 MILLILITER(S): at 22:36

## 2021-02-22 RX ADMIN — ATORVASTATIN CALCIUM 40 MILLIGRAM(S): 80 TABLET, FILM COATED ORAL at 21:43

## 2021-02-22 RX ADMIN — Medication 8 UNIT(S): at 17:49

## 2021-02-22 RX ADMIN — ENOXAPARIN SODIUM 40 MILLIGRAM(S): 100 INJECTION SUBCUTANEOUS at 10:37

## 2021-02-22 RX ADMIN — SENNA PLUS 2 TABLET(S): 8.6 TABLET ORAL at 21:43

## 2021-02-22 RX ADMIN — Medication 100 MICROGRAM(S): at 05:56

## 2021-02-22 RX ADMIN — POLYETHYLENE GLYCOL 3350 17 GRAM(S): 17 POWDER, FOR SOLUTION ORAL at 09:16

## 2021-02-22 RX ADMIN — Medication 20 MILLIEQUIVALENT(S): at 09:06

## 2021-02-22 RX ADMIN — Medication 81 MILLIGRAM(S): at 09:07

## 2021-02-22 RX ADMIN — PANTOPRAZOLE SODIUM 40 MILLIGRAM(S): 20 TABLET, DELAYED RELEASE ORAL at 05:56

## 2021-02-22 RX ADMIN — INSULIN GLARGINE 30 UNIT(S): 100 INJECTION, SOLUTION SUBCUTANEOUS at 22:37

## 2021-02-22 RX ADMIN — OXYCODONE HYDROCHLORIDE 5 MILLIGRAM(S): 5 TABLET ORAL at 21:47

## 2021-02-22 RX ADMIN — Medication 8 UNIT(S): at 08:15

## 2021-02-22 RX ADMIN — DORZOLAMIDE HYDROCHLORIDE 1 DROP(S): 20 SOLUTION/ DROPS OPHTHALMIC at 21:43

## 2021-02-22 RX ADMIN — Medication 15 MILLILITER(S): at 10:36

## 2021-02-22 NOTE — DIETITIAN INITIAL EVALUATION ADULT. - OTHER INFO
Pt with Hx of Type 2 diabetes; manages with Lantus, Apidra, and metformin per H&P. Checks blood sugars [id number]; normal range ~130 mg/dL. Recent A1C 9.2% (), indicating poor glycemic control.    Dosing wt: 164.9 lbs. Daily wt in lbs - standin.2 (), 165.1 (). Slight wt fluctuations noted during admission and likely related to intraoperative fluid shifts. Reports UBW of 76 kG (167 lbs), denies any recent changes in wt.    Pt is currently eating poor to fair with gradual improvement in appetite. Reports intake started to decrease x1 day prior to CABG () due to stress, however is improving. States this is common for pt following surgeries. Per RN flowsheet intake typically >75% meals. Denies recent N/V, diarrhea, or constipation. Last BM .

## 2021-02-22 NOTE — PROGRESS NOTE ADULT - PROBLEM SELECTOR PLAN 1
Will continue current insulin regimen for now. Will continue monitoring FS and FU.  Patient was on insulin at home (Lantus and Apidra). Suggest DC on the following DM regimen:  -Lantus  30u at bedtime  -Apidra 8u before meals  -May continue prior Metformin  -Endo FU 2 weeks  Patient counseled for compliance with consistent low carb diet.

## 2021-02-22 NOTE — DIETITIAN INITIAL EVALUATION ADULT. - CHIEF COMPLAINT
This is a 71 y/o female with significant PMHx of former 5 pack year cigarette smoker, T2DM (HgA1c 9.2 this admission) managed by endocrinologist in [place], denies complications), hypothyroidism, HTN, HLD admitted to [facility] on 2/8/2021 with NSTEMI and transferred to [facility] 2/11/21 for LHC with Dr. Duggan.  LHC revealed 80-90% stenosis to LAD, 80-90% stenosis to prox diag, 80-90% to prox Cx, 70-80 to mid/distal RCA. CTS consult called to evaluate patient for cardiac surgery.  She is now s/p CABGx3 2/17.

## 2021-02-22 NOTE — DIETITIAN INITIAL EVALUATION ADULT. - PERTINENT LABORATORY DATA
02-22 Na141 mmol/L Glu 93 mg/dL K+ 3.8 mmol/L Cr  0.73 mg/dL BUN 15 mg/dL   Alb 2.9 g/dL<L>  02-12 A1C 9.2%  CAPILLARY BLOOD GLUCOSE  POCT Blood Glucose.: 123 mg/dL (22 Feb 2021 11:38)  POCT Blood Glucose.: 117 mg/dL (22 Feb 2021 07:48)  POCT Blood Glucose.: 107 mg/dL (21 Feb 2021 22:21)  POCT Blood Glucose.: 134 mg/dL (21 Feb 2021 16:57)

## 2021-02-22 NOTE — PROGRESS NOTE ADULT - PROBLEM SELECTOR PLAN 1
Continue with ASA 81 mg PO Daily and Plavix 75 mg PO daily   Increase Lopressor 50 mg PO TID.   Continue with Atorvastatin 40 mg PO HS   Increase activity as tolerated.   Encourage Chest PT / Pulmonary toileting and Incentive spirometry every 1 hour x 10 while awake.   Continue with PUD and DVT prophylaxis.   Shower on POD #5.   Maintain PW x 2 --> Isolated   D/C plan home PT once medically cleared   Plan of care discussed with attending continue postop care   Continue with ASA 81 mg PO Daily and Plavix 75 mg PO daily   continue Lopressor 50 mg PO TID  Continue with Atorvastatin 40 mg PO HS   Increase activity as tolerated.   Encourage Chest PT / Pulmonary toileting and Incentive spirometry every 1 hour x 10 while awake.   Continue with PUD and DVT prophylaxis.   Shower on POD #5.   d/c pw today  sorbitol for bm   D/C plan home PT tue

## 2021-02-22 NOTE — DIETITIAN INITIAL EVALUATION ADULT. - ADD RECOMMEND
3) Reinforce nutrition education as able. 4) Continue to trend labs, weight, skin integrity, and intake.

## 2021-02-22 NOTE — PROGRESS NOTE ADULT - ASSESSMENT
Assessment  DMT2: 70y Female with DM T2 with hyperglycemia admitted with chest pain, A1C 9.2%, was on insulin at home, postop on basal bolus insulin, decreased dose yesterday, blood sugars improving and in acceptable range today, no new hypoglycemic episodes, eating meals, appears comfortable, planning DC tomorrow.  CAD: CABG 2/17, on medications, monitored, stable.  Hypothyroidism: on Synthroid 100 mcg po daily, compliant with Synthroid intake, subclinical hypothyroid, FT4 1.4.  Overweight: No strict exercise routines, neither on low calorie diet.        Flor Torres MD  Cell: [phone]  Office: [phone]               Assessment  DMT2: 70y Female with DM T2 with hyperglycemia admitted with chest pain, A1C 9.2%, was on insulin at home, postop on basal bolus insulin,  decreased dose yesterday, blood sugars improving and in acceptable range today, no new hypoglycemic episodes, eating meals, appears comfortable, planning DC tomorrow.  CAD: CABG 2/17, on medications, monitored, stable.  Hypothyroidism: on Synthroid 100 mcg po daily, compliant with Synthroid intake, subclinical hypothyroid, FT4 1.4.  Overweight: No strict exercise routines, neither on low calorie diet.        Flor Torres MD  Cell: [phone]  Office: [phone]

## 2021-02-22 NOTE — PROGRESS NOTE ADULT - SUBJECTIVE AND OBJECTIVE BOX
Chief complaint  Patient is a 70y old  Female who presents with a chief complaint of C3L (21 Feb 2021 09:40)   Review of systems  Patient in bed, looks comfortable, no hypoglycemic episodes.    Labs and Fingersticks  CAPILLARY BLOOD GLUCOSE      POCT Blood Glucose.: 123 mg/dL (22 Feb 2021 11:38)  POCT Blood Glucose.: 117 mg/dL (22 Feb 2021 07:48)  POCT Blood Glucose.: 107 mg/dL (21 Feb 2021 22:21)  POCT Blood Glucose.: 134 mg/dL (21 Feb 2021 16:57)  POCT Blood Glucose.: 202 mg/dL (21 Feb 2021 13:11)  POCT Blood Glucose.: 95 mg/dL (21 Feb 2021 12:05)      Anion Gap, Serum: 11 (02-22 @ 07:11)  Anion Gap, Serum: 10 (02-21 @ 05:03)  Anion Gap, Serum: 10 (02-20 @ 13:24)      Calcium, Total Serum: 9.0 (02-22 @ 07:11)  Calcium, Total Serum: 9.1 (02-21 @ 05:03)  Calcium, Total Serum: 9.0 (02-20 @ 13:24)  Albumin, Serum: 2.9 <L> (02-22 @ 07:11)    Alanine Aminotransferase (ALT/SGPT): 15 (02-22 @ 07:11)  Alkaline Phosphatase, Serum: 51 (02-22 @ 07:11)  Aspartate Aminotransferase (AST/SGOT): 22 (02-22 @ 07:11)        02-22    141  |  107  |  15  ----------------------------<  93  3.8   |  23  |  0.73    Ca    9.0      22 Feb 2021 07:11  Mg     2.0     02-20    TPro  5.9<L>  /  Alb  2.9<L>  /  TBili  0.5  /  DBili  x   /  AST  22  /  ALT  15  /  AlkPhos  51  02-22                        10.5   10.08 )-----------( 247      ( 22 Feb 2021 07:13 )             33.1     Medications  MEDICATIONS  (STANDING):  aspirin enteric coated 81 milliGRAM(s) Oral daily  atorvastatin 40 milliGRAM(s) Oral at bedtime  chlorhexidine 2% Cloths 1 Application(s) Topical <User Schedule>  clopidogrel Tablet 75 milliGRAM(s) Oral daily  dextrose 40% Gel 15 Gram(s) Oral once  dextrose 50% Injectable 25 Gram(s) IV Push once  dorzolamide 2% Ophthalmic Solution 1 Drop(s) Both EYES <User Schedule>  enoxaparin Injectable 40 milliGRAM(s) SubCutaneous daily  glucagon  Injectable 1 milliGRAM(s) IntraMuscular once  insulin glargine Injectable (LANTUS) 30 Unit(s) SubCutaneous at bedtime  insulin lispro (ADMELOG) corrective regimen sliding scale   SubCutaneous three times a day before meals  insulin lispro (ADMELOG) corrective regimen sliding scale   SubCutaneous at bedtime  insulin lispro Injectable (ADMELOG) 8 Unit(s) SubCutaneous three times a day before meals  levothyroxine 100 MICROGram(s) Oral daily  metoprolol tartrate 50 milliGRAM(s) Oral every 8 hours  pantoprazole    Tablet 40 milliGRAM(s) Oral before breakfast  polyethylene glycol 3350 17 Gram(s) Oral daily  senna 2 Tablet(s) Oral at bedtime  sodium chloride 0.9%. 1000 milliLiter(s) (10 mL/Hr) IV Continuous <Continuous>      Physical Exam  General: Patient comfortable in bed  Vital Signs Last 12 Hrs  T(F): 98 (02-22-21 @ 05:40), Max: 98 (02-22-21 @ 05:40)  HR: 91 (02-22-21 @ 05:40) (91 - 91)  BP: 118/77 (02-22-21 @ 05:40) (118/77 - 118/77)  BP(mean): --  RR: 18 (02-22-21 @ 05:40) (18 - 18)  SpO2: 97% (02-22-21 @ 05:40) (97% - 97%)  Neck: No palpable thyroid nodules.  CVS: S1S2, No murmurs  Respiratory: No wheezing, no crepitations  GI: Abdomen soft, bowel sounds positive  Musculoskeletal:  edema lower extremities.   Skin: No skin rashes, no ecchymosis    Diagnostics    Free Thyroxine, Serum: AM Sched. Collection: 15-Feb-2021 06:00 (02-14 @ 14:33)           Chief complaint  Patient is a 70y old  Female who presents with a chief complaint of C3L (21 Feb 2021 09:40)   Review of systems  Patient in bed, looks comfortable, no hypoglycemic episodes.    Labs and Fingersticks  CAPILLARY BLOOD GLUCOSE      POCT Blood Glucose.: 123 mg/dL (22 Feb 2021 11:38)  POCT Blood Glucose.: 117 mg/dL (22 Feb 2021 07:48)  POCT Blood Glucose.: 107 mg/dL (21 Feb 2021 22:21)  POCT Blood Glucose.: 134 mg/dL (21 Feb 2021 16:57)  POCT Blood Glucose.: 202 mg/dL (21 Feb 2021 13:11)  POCT Blood Glucose.: 95 mg/dL (21 Feb 2021 12:05)      Anion Gap, Serum: 11 (02-22 @ 07:11)  Anion Gap, Serum: 10 (02-21 @ 05:03)  Anion Gap, Serum: 10 (02-20 @ 13:24)      Calcium, Total Serum: 9.0 (02-22 @ 07:11)  Calcium, Total Serum: 9.1 (02-21 @ 05:03)  Calcium, Total Serum: 9.0 (02-20 @ 13:24)  Albumin, Serum: 2.9 <L> (02-22 @ 07:11)    Alanine Aminotransferase (ALT/SGPT): 15 (02-22 @ 07:11)  Alkaline Phosphatase, Serum: 51 (02-22 @ 07:11)  Aspartate Aminotransferase (AST/SGOT): 22 (02-22 @ 07:11)        02-22    141  |  107  |  15  ----------------------------<  93  3.8   |  23  |  0.73    Ca    9.0      22 Feb 2021 07:11  Mg     2.0     02-20    TPro  5.9<L>  /  Alb  2.9<L>  /  TBili  0.5  /  DBili  x   /  AST  22  /  ALT  15  /  AlkPhos  51  02-22                        10.5   10.08 )-----------( 247      ( 22 Feb 2021 07:13 )             33.1     Medications  MEDICATIONS  (STANDING):  aspirin enteric coated 81 milliGRAM(s) Oral daily  atorvastatin 40 milliGRAM(s) Oral at bedtime  chlorhexidine 2% Cloths 1 Application(s) Topical <User Schedule>  clopidogrel Tablet 75 milliGRAM(s) Oral daily  dextrose 40% Gel 15 Gram(s) Oral once  dextrose 50% Injectable 25 Gram(s) IV Push once  dorzolamide 2% Ophthalmic Solution 1 Drop(s) Both EYES <User Schedule>  enoxaparin Injectable 40 milliGRAM(s) SubCutaneous daily  glucagon  Injectable 1 milliGRAM(s) IntraMuscular once  insulin glargine Injectable (LANTUS) 30 Unit(s) SubCutaneous at bedtime  insulin lispro (ADMELOG) corrective regimen sliding scale   SubCutaneous three times a day before meals  insulin lispro (ADMELOG) corrective regimen sliding scale   SubCutaneous at bedtime  insulin lispro Injectable (ADMELOG) 8 Unit(s) SubCutaneous three times a day before meals  levothyroxine 100 MICROGram(s) Oral daily  metoprolol tartrate 50 milliGRAM(s) Oral every 8 hours  pantoprazole    Tablet 40 milliGRAM(s) Oral before breakfast  polyethylene glycol 3350 17 Gram(s) Oral daily  senna 2 Tablet(s) Oral at bedtime  sodium chloride 0.9%. 1000 milliLiter(s) (10 mL/Hr) IV Continuous <Continuous>      Physical Exam  General: Patient comfortable in bed  Vital Signs Last 12 Hrs  T(F): 98 (02-22-21 @ 05:40), Max: 98 (02-22-21 @ 05:40)  HR: 91 (02-22-21 @ 05:40) (91 - 91)  BP: 118/77 (02-22-21 @ 05:40) (118/77 - 118/77)  BP(mean): --  RR: 18 (02-22-21 @ 05:40) (18 - 18)  SpO2: 97% (02-22-21 @ 05:40) (97% - 97%)  Neck: No palpable thyroid nodules.  CVS: S1S2, No murmurs  Respiratory: No wheezing, no crepitations  GI: Abdomen soft, bowel sounds positive  Musculoskeletal:  edema lower extremities.   Skin: No skin rashes, no ecchymosis    Diagnostics    Free Thyroxine, Serum: AM Sched. Collection: 15-Feb-2021 06:00 (02-14 @ 14:33)

## 2021-02-22 NOTE — PROGRESS NOTE ADULT - SUBJECTIVE AND OBJECTIVE BOX
VITAL SIGNS    Telemetry:    Vital Signs Last 24 Hrs  T(C): 36.7 (02-22-21 @ 05:40), Max: 36.9 (02-21-21 @ 21:03)  T(F): 98 (02-22-21 @ 05:40), Max: 98.4 (02-21-21 @ 21:03)  HR: 91 (02-22-21 @ 05:40) (80 - 102)  BP: 118/77 (02-22-21 @ 05:40) (115/59 - 168/94)  RR: 18 (02-22-21 @ 05:40) (18 - 18)  SpO2: 97% (02-22-21 @ 05:40) (97% - 98%)            02-21 @ 07:01  -  02-22 @ 07:00  --------------------------------------------------------  IN: 900 mL / OUT: 700 mL / NET: 200 mL    02-22 @ 07:01  -  02-22 @ 10:06  --------------------------------------------------------  IN: 360 mL / OUT: 300 mL / NET: 60 mL       Daily     Daily   Admit Wt: Drug Dosing Weight  Height (cm): 151 (17 Feb 2021 07:16)  Weight (kg): 74.8 (17 Feb 2021 07:16)  BMI (kg/m2): 32.8 (17 Feb 2021 07:16)  BSA (m2): 1.71 (17 Feb 2021 07:16)    Bilirubin Total, Serum: 0.5 mg/dL (02-22 @ 07:11)    CAPILLARY BLOOD GLUCOSE      POCT Blood Glucose.: 117 mg/dL (22 Feb 2021 07:48)  POCT Blood Glucose.: 107 mg/dL (21 Feb 2021 22:21)  POCT Blood Glucose.: 134 mg/dL (21 Feb 2021 16:57)  POCT Blood Glucose.: 202 mg/dL (21 Feb 2021 13:11)  POCT Blood Glucose.: 95 mg/dL (21 Feb 2021 12:05)  POCT Blood Glucose.: 54 mg/dL (21 Feb 2021 11:42)          acetaminophen   Tablet .. 650 milliGRAM(s) Oral every 6 hours PRN  aspirin enteric coated 81 milliGRAM(s) Oral daily  atorvastatin 40 milliGRAM(s) Oral at bedtime  chlorhexidine 2% Cloths 1 Application(s) Topical <User Schedule>  clopidogrel Tablet 75 milliGRAM(s) Oral daily  dextrose 40% Gel 15 Gram(s) Oral once  dextrose 50% Injectable 25 Gram(s) IV Push once  dorzolamide 2% Ophthalmic Solution 1 Drop(s) Both EYES <User Schedule>  enoxaparin Injectable 40 milliGRAM(s) SubCutaneous daily  glucagon  Injectable 1 milliGRAM(s) IntraMuscular once  insulin glargine Injectable (LANTUS) 30 Unit(s) SubCutaneous at bedtime  insulin lispro (ADMELOG) corrective regimen sliding scale   SubCutaneous three times a day before meals  insulin lispro (ADMELOG) corrective regimen sliding scale   SubCutaneous at bedtime  insulin lispro Injectable (ADMELOG) 8 Unit(s) SubCutaneous three times a day before meals  insulin regular Infusion 2 Unit(s)/Hr IV Continuous <Continuous>  levothyroxine 100 MICROGram(s) Oral daily  metoprolol tartrate 50 milliGRAM(s) Oral every 8 hours  oxyCODONE    IR 10 milliGRAM(s) Oral every 4 hours PRN  oxyCODONE    IR 5 milliGRAM(s) Oral every 4 hours PRN  pantoprazole    Tablet 40 milliGRAM(s) Oral before breakfast  polyethylene glycol 3350 17 Gram(s) Oral daily  senna 2 Tablet(s) Oral at bedtime  sodium chloride 0.9%. 1000 milliLiter(s) IV Continuous <Continuous>  sorbitol 70% Solution 15 milliLiter(s) Oral once      PHYSICAL EXAM    Subjective: "Hi.   Neurology: alert and oriented x 3, nonfocal, no gross deficits  CV : tele:  RSR  Sternal Wound :  CDI with dressing , Stable  Lungs: clear. RR easy, unlabored   Abdomen: soft, nontender, nondistended, positive bowel sounds, bowel movement   Neg N/V/D   :  pt voiding without difficulty   Extremities:   CALVILLO; edema, neg calf tenderness.   PPP bilaterally      PW:  Chest tubes:                 VITAL SIGNS    Telemetry:  rsr 70-90   Vital Signs Last 24 Hrs  T(C): 36.7 (02-22-21 @ 05:40), Max: 36.9 (02-21-21 @ 21:03)  T(F): 98 (02-22-21 @ 05:40), Max: 98.4 (02-21-21 @ 21:03)  HR: 91 (02-22-21 @ 05:40) (80 - 102)  BP: 118/77 (02-22-21 @ 05:40) (115/59 - 168/94)  RR: 18 (02-22-21 @ 05:40) (18 - 18)  SpO2: 97% (02-22-21 @ 05:40) (97% - 98%)            02-21 @ 07:01  -  02-22 @ 07:00  --------------------------------------------------------  IN: 900 mL / OUT: 700 mL / NET: 200 mL    02-22 @ 07:01  -  02-22 @ 10:06  --------------------------------------------------------  IN: 360 mL / OUT: 300 mL / NET: 60 mL       Daily     Daily   Admit Wt: Drug Dosing Weight  Height (cm): 151 (17 Feb 2021 07:16)  Weight (kg): 74.8 (17 Feb 2021 07:16)  BMI (kg/m2): 32.8 (17 Feb 2021 07:16)  BSA (m2): 1.71 (17 Feb 2021 07:16)    Bilirubin Total, Serum: 0.5 mg/dL (02-22 @ 07:11)    CAPILLARY BLOOD GLUCOSE      POCT Blood Glucose.: 117 mg/dL (22 Feb 2021 07:48)  POCT Blood Glucose.: 107 mg/dL (21 Feb 2021 22:21)  POCT Blood Glucose.: 134 mg/dL (21 Feb 2021 16:57)  POCT Blood Glucose.: 202 mg/dL (21 Feb 2021 13:11)  POCT Blood Glucose.: 95 mg/dL (21 Feb 2021 12:05)  POCT Blood Glucose.: 54 mg/dL (21 Feb 2021 11:42)          acetaminophen   Tablet .. 650 milliGRAM(s) Oral every 6 hours PRN  aspirin enteric coated 81 milliGRAM(s) Oral daily  atorvastatin 40 milliGRAM(s) Oral at bedtime  chlorhexidine 2% Cloths 1 Application(s) Topical <User Schedule>  clopidogrel Tablet 75 milliGRAM(s) Oral daily  dextrose 40% Gel 15 Gram(s) Oral once  dextrose 50% Injectable 25 Gram(s) IV Push once  dorzolamide 2% Ophthalmic Solution 1 Drop(s) Both EYES <User Schedule>  enoxaparin Injectable 40 milliGRAM(s) SubCutaneous daily  glucagon  Injectable 1 milliGRAM(s) IntraMuscular once  insulin glargine Injectable (LANTUS) 30 Unit(s) SubCutaneous at bedtime  insulin lispro (ADMELOG) corrective regimen sliding scale   SubCutaneous three times a day before meals  insulin lispro (ADMELOG) corrective regimen sliding scale   SubCutaneous at bedtime  insulin lispro Injectable (ADMELOG) 8 Unit(s) SubCutaneous three times a day before meals  insulin regular Infusion 2 Unit(s)/Hr IV Continuous <Continuous>  levothyroxine 100 MICROGram(s) Oral daily  metoprolol tartrate 50 milliGRAM(s) Oral every 8 hours  oxyCODONE    IR 10 milliGRAM(s) Oral every 4 hours PRN  oxyCODONE    IR 5 milliGRAM(s) Oral every 4 hours PRN  pantoprazole    Tablet 40 milliGRAM(s) Oral before breakfast  polyethylene glycol 3350 17 Gram(s) Oral daily  senna 2 Tablet(s) Oral at bedtime  sodium chloride 0.9%. 1000 milliLiter(s) IV Continuous <Continuous>  sorbitol 70% Solution 15 milliLiter(s) Oral once      PHYSICAL EXAM    Subjective: "Ilia."   Neurology: alert and oriented x 3, nonfocal, no gross deficits; English speaking only   CV : tele:  RSR 70-80   Midsternal incision cdi nakia- sternum stable   Lungs: clear. RR easy, unlabored   Abdomen: soft, nontender, nondistended, positive bowel sounds; neg bowel movement; Neg N/V/D   :  pt voiding without difficulty   Extremities:   CALVILLO; neg LE edema, neg calf tenderness.   PPP bilaterally; rt SVG site cdi nakia       PW: isolated   Chest tubes: none                 VITAL SIGNS    Telemetry:  rsr 70-90   Vital Signs Last 24 Hrs  T(C): 36.7 (02-22-21 @ 05:40), Max: 36.9 (02-21-21 @ 21:03)  T(F): 98 (02-22-21 @ 05:40), Max: 98.4 (02-21-21 @ 21:03)  HR: 91 (02-22-21 @ 05:40) (80 - 102)  BP: 118/77 (02-22-21 @ 05:40) (115/59 - 168/94)  RR: 18 (02-22-21 @ 05:40) (18 - 18)  SpO2: 97% (02-22-21 @ 05:40) (97% - 98%)            02-21 @ 07:01  -  02-22 @ 07:00  --------------------------------------------------------  IN: 900 mL / OUT: 700 mL / NET: 200 mL    02-22 @ 07:01  -  02-22 @ 10:06  --------------------------------------------------------  IN: 360 mL / OUT: 300 mL / NET: 60 mL       Daily     Daily   Admit Wt: Drug Dosing Weight  Height (cm): 151 (17 Feb 2021 07:16)  Weight (kg): 74.8 (17 Feb 2021 07:16)  BMI (kg/m2): 32.8 (17 Feb 2021 07:16)  BSA (m2): 1.71 (17 Feb 2021 07:16)    Bilirubin Total, Serum: 0.5 mg/dL (02-22 @ 07:11)    CAPILLARY BLOOD GLUCOSE      POCT Blood Glucose.: 117 mg/dL (22 Feb 2021 07:48)  POCT Blood Glucose.: 107 mg/dL (21 Feb 2021 22:21)  POCT Blood Glucose.: 134 mg/dL (21 Feb 2021 16:57)  POCT Blood Glucose.: 202 mg/dL (21 Feb 2021 13:11)  POCT Blood Glucose.: 95 mg/dL (21 Feb 2021 12:05)  POCT Blood Glucose.: 54 mg/dL (21 Feb 2021 11:42)          acetaminophen   Tablet .. 650 milliGRAM(s) Oral every 6 hours PRN  aspirin enteric coated 81 milliGRAM(s) Oral daily  atorvastatin 40 milliGRAM(s) Oral at bedtime  chlorhexidine 2% Cloths 1 Application(s) Topical <User Schedule>  clopidogrel Tablet 75 milliGRAM(s) Oral daily  dextrose 40% Gel 15 Gram(s) Oral once  dextrose 50% Injectable 25 Gram(s) IV Push once  dorzolamide 2% Ophthalmic Solution 1 Drop(s) Both EYES <User Schedule>  enoxaparin Injectable 40 milliGRAM(s) SubCutaneous daily  glucagon  Injectable 1 milliGRAM(s) IntraMuscular once  insulin glargine Injectable (LANTUS) 30 Unit(s) SubCutaneous at bedtime  insulin lispro (ADMELOG) corrective regimen sliding scale   SubCutaneous three times a day before meals  insulin lispro (ADMELOG) corrective regimen sliding scale   SubCutaneous at bedtime  insulin lispro Injectable (ADMELOG) 8 Unit(s) SubCutaneous three times a day before meals  insulin regular Infusion 2 Unit(s)/Hr IV Continuous <Continuous>  levothyroxine 100 MICROGram(s) Oral daily  metoprolol tartrate 50 milliGRAM(s) Oral every 8 hours  oxyCODONE    IR 10 milliGRAM(s) Oral every 4 hours PRN  oxyCODONE    IR 5 milliGRAM(s) Oral every 4 hours PRN  pantoprazole    Tablet 40 milliGRAM(s) Oral before breakfast  polyethylene glycol 3350 17 Gram(s) Oral daily  senna 2 Tablet(s) Oral at bedtime  sodium chloride 0.9%. 1000 milliLiter(s) IV Continuous <Continuous>  sorbitol 70% Solution 15 milliLiter(s) Oral once      PHYSICAL EXAM    Subjective: "Ilia."   Neurology: alert and oriented x 3, nonfocal, no gross deficits; Setswana speaking only   CV : tele:  RSR 70-80   Midsternal incision cdi nakia- sternum stable   Lungs: clear. RR easy, unlabored   Abdomen: soft, nontender, nondistended, positive bowel sounds; neg bowel movement; Neg N/V/D   :  pt voiding without difficulty   Extremities:   CALVILLO; neg LE edema, neg calf tenderness.   PPP bilaterally; rt SVG site cdi nakia  - grossly ecchymotic but soft     PW: isolated   Chest tubes: none

## 2021-02-22 NOTE — DIETITIAN INITIAL EVALUATION ADULT. - PERTINENT MEDS FT
insulin glargine Injectable (LANTUS) 30 Unit(s) SubCutaneous at bedtime  insulin lispro (ADMELOG) corrective regimen sliding scale   SubCutaneous three times a day before meals  insulin lispro (ADMELOG) corrective regimen sliding scale   SubCutaneous at bedtime  insulin lispro Injectable (ADMELOG) 8 Unit(s) SubCutaneous three times a day before meals  levothyroxine, polyethylene glycol, senna, atorvastatin

## 2021-02-22 NOTE — DIETITIAN INITIAL EVALUATION ADULT. - PHYSCIAL ASSESSMENT
IBW%: 168%  Skin per nursing documentation: No pressure injuries noted. Midsternal incision noted. overweight

## 2021-02-22 NOTE — DIETITIAN INITIAL EVALUATION ADULT. - OTHER CALCULATIONS
Dosing wt used for energy needs 74.8 kG; Upper IBW 48.8 kG used for protein. Fluid needs deferred to provider.

## 2021-02-22 NOTE — PROGRESS NOTE ADULT - PROBLEM SELECTOR PLAN 2
Endo following for uncontrolled dm2- A1C 9.2;   Insulin gtt D/C’d   Continue on Insulin Regimen as per Endo  Continue with DASH/ diabetic diet.   Accuchecks 4 times a day AC and HS with Admelog corrective scale Endo following for uncontrolled dm2- A1C 9.2;   Continue on Insulin Regimen as per Endo  Continue with DASH/ diabetic diet.   Accuchecks 4 times a day AC and HS with Admelog corrective scale

## 2021-02-22 NOTE — DIETITIAN INITIAL EVALUATION ADULT. - ORAL INTAKE PTA/DIET HISTORY
Pt reports eating well with no changes in appetite; reports always being a light eater. Pt states was following a consistent carbohydrate diet, low in beans and flour. Confirms no known food allergies. Denies Hx of chewing or swallowing issues. Denies micronutrient or nutrient supplement use.

## 2021-02-22 NOTE — DIETITIAN INITIAL EVALUATION ADULT. - NSPROEDAREADYLEARN_GEN_A_NUR
Provided verbal and written education on heart healthy nutrition for people with diabetes in [place]. Emphasis on pairing carbohydrates with protein for glycemic control; portion sizes; choosing whole grains vs refined carbohydrates; limiting sodium intake. Provided Heart-Healthy Consistent Carbohydrate Nutrition Therapy in [place]. Also verbally reviewed need for adequate protein - energy intake in setting of midsternal incision. Good comprehension noted. Pt made aware RD to remain available for any questions./none

## 2021-02-23 ENCOUNTER — TRANSCRIPTION ENCOUNTER (OUTPATIENT)
Age: 71
End: 2021-02-23

## 2021-02-23 VITALS — HEART RATE: 88 BPM | DIASTOLIC BLOOD PRESSURE: 72 MMHG | SYSTOLIC BLOOD PRESSURE: 118 MMHG

## 2021-02-23 LAB
ANION GAP SERPL CALC-SCNC: 10 MMOL/L — SIGNIFICANT CHANGE UP (ref 5–17)
BUN SERPL-MCNC: 16 MG/DL — SIGNIFICANT CHANGE UP (ref 7–23)
CALCIUM SERPL-MCNC: 8.9 MG/DL — SIGNIFICANT CHANGE UP (ref 8.4–10.5)
CHLORIDE SERPL-SCNC: 107 MMOL/L — SIGNIFICANT CHANGE UP (ref 96–108)
CO2 SERPL-SCNC: 22 MMOL/L — SIGNIFICANT CHANGE UP (ref 22–31)
CREAT SERPL-MCNC: 0.79 MG/DL — SIGNIFICANT CHANGE UP (ref 0.5–1.3)
GLUCOSE BLDC GLUCOMTR-MCNC: 135 MG/DL — HIGH (ref 70–99)
GLUCOSE BLDC GLUCOMTR-MCNC: 169 MG/DL — HIGH (ref 70–99)
GLUCOSE SERPL-MCNC: 115 MG/DL — HIGH (ref 70–99)
HCT VFR BLD CALC: 33.6 % — LOW (ref 34.5–45)
HGB BLD-MCNC: 10.7 G/DL — LOW (ref 11.5–15.5)
MCHC RBC-ENTMCNC: 29.4 PG — SIGNIFICANT CHANGE UP (ref 27–34)
MCHC RBC-ENTMCNC: 31.8 GM/DL — LOW (ref 32–36)
MCV RBC AUTO: 92.3 FL — SIGNIFICANT CHANGE UP (ref 80–100)
NRBC # BLD: 0 /100 WBCS — SIGNIFICANT CHANGE UP (ref 0–0)
PLATELET # BLD AUTO: 299 K/UL — SIGNIFICANT CHANGE UP (ref 150–400)
POTASSIUM SERPL-MCNC: 4 MMOL/L — SIGNIFICANT CHANGE UP (ref 3.5–5.3)
POTASSIUM SERPL-SCNC: 4 MMOL/L — SIGNIFICANT CHANGE UP (ref 3.5–5.3)
RBC # BLD: 3.64 M/UL — LOW (ref 3.8–5.2)
RBC # FLD: 13.8 % — SIGNIFICANT CHANGE UP (ref 10.3–14.5)
SODIUM SERPL-SCNC: 139 MMOL/L — SIGNIFICANT CHANGE UP (ref 135–145)
WBC # BLD: 13.15 K/UL — HIGH (ref 3.8–10.5)
WBC # FLD AUTO: 13.15 K/UL — HIGH (ref 3.8–10.5)

## 2021-02-23 RX ORDER — LOSARTAN POTASSIUM 100 MG/1
1 TABLET, FILM COATED ORAL
Qty: 0 | Refills: 0 | DISCHARGE

## 2021-02-23 RX ORDER — INSULIN LISPRO 100/ML
6 VIAL (ML) SUBCUTANEOUS
Refills: 0 | Status: DISCONTINUED | OUTPATIENT
Start: 2021-02-23 | End: 2021-02-23

## 2021-02-23 RX ORDER — INSULIN GLARGINE 100 [IU]/ML
10 INJECTION, SOLUTION SUBCUTANEOUS
Qty: 0 | Refills: 0 | DISCHARGE

## 2021-02-23 RX ORDER — LEVOTHYROXINE SODIUM 125 MCG
1 TABLET ORAL
Qty: 30 | Refills: 0
Start: 2021-02-23 | End: 2021-03-24

## 2021-02-23 RX ORDER — INSULIN GLULISINE 100 [IU]/ML
8 INJECTION, SOLUTION SUBCUTANEOUS
Qty: 0 | Refills: 0 | DISCHARGE

## 2021-02-23 RX ORDER — LEVOTHYROXINE SODIUM 125 MCG
1 TABLET ORAL
Qty: 0 | Refills: 0 | DISCHARGE

## 2021-02-23 RX ORDER — ASPIRIN/CALCIUM CARB/MAGNESIUM 324 MG
1 TABLET ORAL
Qty: 30 | Refills: 0
Start: 2021-02-23 | End: 2021-03-24

## 2021-02-23 RX ORDER — OXYCODONE HYDROCHLORIDE 5 MG/1
1 TABLET ORAL
Qty: 16 | Refills: 0
Start: 2021-02-23 | End: 2021-02-26

## 2021-02-23 RX ORDER — CLOPIDOGREL BISULFATE 75 MG/1
1 TABLET, FILM COATED ORAL
Qty: 30 | Refills: 0
Start: 2021-02-23 | End: 2021-03-24

## 2021-02-23 RX ORDER — TICAGRELOR 90 MG/1
1 TABLET ORAL
Qty: 0 | Refills: 0 | DISCHARGE

## 2021-02-23 RX ORDER — ASPIRIN/CALCIUM CARB/MAGNESIUM 324 MG
1 TABLET ORAL
Qty: 0 | Refills: 0 | DISCHARGE

## 2021-02-23 RX ORDER — INSULIN GLARGINE 100 [IU]/ML
26 INJECTION, SOLUTION SUBCUTANEOUS AT BEDTIME
Refills: 0 | Status: DISCONTINUED | OUTPATIENT
Start: 2021-02-23 | End: 2021-02-23

## 2021-02-23 RX ORDER — METOPROLOL TARTRATE 50 MG
150 TABLET ORAL ONCE
Refills: 0 | Status: COMPLETED | OUTPATIENT
Start: 2021-02-23 | End: 2021-02-23

## 2021-02-23 RX ORDER — INSULIN GLARGINE 100 [IU]/ML
30 INJECTION, SOLUTION SUBCUTANEOUS
Qty: 900 | Refills: 0
Start: 2021-02-23 | End: 2021-03-24

## 2021-02-23 RX ORDER — PANTOPRAZOLE SODIUM 20 MG/1
1 TABLET, DELAYED RELEASE ORAL
Qty: 10 | Refills: 0
Start: 2021-02-23 | End: 2021-03-04

## 2021-02-23 RX ORDER — METOPROLOL TARTRATE 50 MG
3 TABLET ORAL
Qty: 90 | Refills: 0
Start: 2021-02-23 | End: 2021-03-24

## 2021-02-23 RX ORDER — ACETAMINOPHEN 500 MG
2 TABLET ORAL
Qty: 0 | Refills: 0 | DISCHARGE
Start: 2021-02-23

## 2021-02-23 RX ORDER — SENNA PLUS 8.6 MG/1
2 TABLET ORAL
Qty: 30 | Refills: 0
Start: 2021-02-23 | End: 2021-03-09

## 2021-02-23 RX ORDER — METOPROLOL TARTRATE 50 MG
1 TABLET ORAL
Qty: 0 | Refills: 0 | DISCHARGE

## 2021-02-23 RX ORDER — INSULIN GLULISINE 100 [IU]/ML
10 INJECTION, SOLUTION SUBCUTANEOUS
Qty: 0 | Refills: 0 | DISCHARGE

## 2021-02-23 RX ADMIN — Medication 100 MICROGRAM(S): at 05:27

## 2021-02-23 RX ADMIN — Medication 6 UNIT(S): at 11:34

## 2021-02-23 RX ADMIN — Medication 2: at 11:35

## 2021-02-23 RX ADMIN — CLOPIDOGREL BISULFATE 75 MILLIGRAM(S): 75 TABLET, FILM COATED ORAL at 11:34

## 2021-02-23 RX ADMIN — PANTOPRAZOLE SODIUM 40 MILLIGRAM(S): 20 TABLET, DELAYED RELEASE ORAL at 05:27

## 2021-02-23 RX ADMIN — POLYETHYLENE GLYCOL 3350 17 GRAM(S): 17 POWDER, FOR SOLUTION ORAL at 11:34

## 2021-02-23 RX ADMIN — OXYCODONE HYDROCHLORIDE 5 MILLIGRAM(S): 5 TABLET ORAL at 11:52

## 2021-02-23 RX ADMIN — DORZOLAMIDE HYDROCHLORIDE 1 DROP(S): 20 SOLUTION/ DROPS OPHTHALMIC at 08:10

## 2021-02-23 RX ADMIN — Medication 150 MILLIGRAM(S): at 12:39

## 2021-02-23 RX ADMIN — CHLORHEXIDINE GLUCONATE 1 APPLICATION(S): 213 SOLUTION TOPICAL at 11:32

## 2021-02-23 RX ADMIN — Medication 50 MILLIGRAM(S): at 05:27

## 2021-02-23 RX ADMIN — Medication 81 MILLIGRAM(S): at 11:34

## 2021-02-23 RX ADMIN — Medication 8 UNIT(S): at 08:10

## 2021-02-23 NOTE — DISCHARGE NOTE PROVIDER - NSDCMRMEDTOKEN_GEN_ALL_CORE_FT
acetaminophen 325 mg oral tablet: 2 tab(s) orally every 6 hours, As needed, Mild Pain (1 - 3)  Apidra SoloStar Pen 100 units/mL injectable solution: 8 unit(s) injectable 3 times a day (before meals)  aspirin 81 mg oral delayed release tablet: 1 tab(s) orally once a day  atorvastatin 40 mg oral tablet: 1 tab(s) orally once a day HOME/HOSPITAL   clopidogrel 75 mg oral tablet: 1 tab(s) orally once a day  cyclobenzaprine 5 mg oral tablet: 1 tab(s) orally once a day (at bedtime) HOME/ HOSPITAL   dorzolamide 2% ophthalmic solution: 1 drop(s) to each affected eye 2 times a day 9AM and 9PM HOME/HOSPITAL   insulin glargine: 30 unit(s) subcutaneous once a day (at bedtime)   levothyroxine 100 mcg (0.1 mg) oral tablet: 1 tab(s) orally once a day  metFORMIN 850 mg oral tablet: 1 tab(s) orally 2 times a day HOME   Metoprolol Succinate ER 50 mg oral tablet, extended release: 3 tab(s) orally once a day   oxyCODONE 5 mg oral tablet: 1 tab(s) orally every 6 hours, As Needed -Moderate Pain (4 - 6) MDD:4  pantoprazole 40 mg oral delayed release tablet: 1 tab(s) orally once a day (before a meal)  senna oral tablet: 2 tab(s) orally once a day (at bedtime)

## 2021-02-23 NOTE — DISCHARGE NOTE PROVIDER - HOSPITAL COURSE
This is a 71 y/o female with significant PMHx of former 5 pack year cigarette smoker, T2DM (HgA1c 9.2 this admission) managed by endocrinologist in [place], denies complications), hypothyroidism, HTN, HLD admitted to [facility] on 2/8/2021 with NSTEMI and transferred to [facility] 2/11/21 for LHC with Dr. Duggan.  LHC revealed 80-90% stenosis to LAD, 80-90% stenosis to prox diag, 80-90% to prox Cx, 70-80 to mid/distal RCA. CTS consult called to evaluate patient for cardiac surgery.  She is now POD #2 from CABGx3.    2/12 Pre op Cardiac Surgery in progress. D/C Brilinta.  P2Y12 94   2/13 VSS; rsr 60-70; stable at this time echo neg; carotids neg; repeat P2y12 in am; endo consulted for uncontrolled dm2- A1C 9.2; pt placed on lantus and admelog   continue asa/ statin/ b-blockers; hep gttp; ARB d/c preop   2/14 VSS - no c/o cp this am - maintain hep gtt & bb-will inc. to 50mg po bid-or tue  2/15 VSS- no chest pain today - continue on hep gtt  2/16 VSS- No chest pain- continue on hep gtt. Preop workup completed. Plan for CABG with Dr. Ball tomorrow.   2/17 s /p CABG, with JUAN CARLOS , LIMA-LAD, SVG-RCA, SVG-Circ   Post op hypotension, ivf, pressors  Extubated d 0  Hyperglycemia  insulin infusion  MS/ L pl remain in place  Transferred to sdu  2/19 Med and Lt pleural CT removed, CXR stable.  F/U TOV.  Beta blocker inc'd 25 BID to TID for . FS in 300's, insulin gtt restarted.    2/20 VVS; Continue with current medication regimen.  Insulin gtt d/c 2/2 increase agitation and confusion overnight  Patient continues hyperglycemic --> Lantus increased to 36 units and Admelog increased to 15 units SQ TID pre meal. K+ supplement.  Continue on a 1:1 for safety precautions. Lopressor increased to 50 mg PO TID.   Dispo: Home candidate.    2/21 VSS; Plan to move to floor, monitor till noon- plan to d'c 1:1 if no agitation or confusion.   2/22 VSS; increase activity as tolerated; sorbitol for bm; pw d/c  2/23 VSS - glucose well controlled  d/c home this am - rounds made w/ dr. ball

## 2021-02-23 NOTE — PROGRESS NOTE ADULT - PROBLEM SELECTOR PLAN 1
Will decrease Lantus to 26u at bedtime.  Will decrease Admelog to 6u before each meal and continue Admelog correction scale coverage. Will continue monitoring FS and FU.  Patient was on insulin at home (Lantus and Apidra). Suggest DC on the following DM regimen:  -Lantus 26u at bedtime  -Apidra 6u before meals  -May continue prior Metformin  -Endo FU 2 weeks  Patient counseled for compliance with consistent low carb diet.

## 2021-02-23 NOTE — DISCHARGE NOTE NURSING/CASE MANAGEMENT/SOCIAL WORK - NSDCFUADDAPPT_GEN_ALL_CORE_FT
Please follow up with your surgeon  Please follow up with regular clinic visits at [address] [place] [address] Neck   [phone]

## 2021-02-23 NOTE — PROGRESS NOTE ADULT - PROBLEM SELECTOR PROBLEM 2
DM (diabetes mellitus)
DM (diabetes mellitus)
Hypothyroidism
HTN (hypertension)
Hypothyroidism
HTN (hypertension)
Hypothyroidism
DM (diabetes mellitus)
HLD (hyperlipidemia)
HTN (hypertension)

## 2021-02-23 NOTE — PROGRESS NOTE ADULT - PROVIDER SPECIALTY LIST ADULT
Critical Care
Endocrinology
CT Surgery
Critical Care
Endocrinology
Endocrinology
CT Surgery
Endocrinology
Endocrinology
CT Surgery
Cardiology
Endocrinology
CT Surgery
Endocrinology
CT Surgery

## 2021-02-23 NOTE — DISCHARGE NOTE PROVIDER - CARE PROVIDER_API CALL
Hugo Adhikari)  Surgery; Thoracic Surgery  [address] [place] [address]  Phone: [phone]  Fax: [phone]  Follow Up Time:

## 2021-02-23 NOTE — DISCHARGE NOTE PROVIDER - NSDCPNSUBOBJ_GEN_ALL_CORE
PHYSICAL EXAM    Subjective: "Ilia."   Neurology: alert and oriented x 3, nonfocal, no gross deficits; Bengali speaking only   CV : tele:  RSR 70-80   Midsternal incision cdi nakia- sternum stable   Lungs: clear. RR easy, unlabored   Abdomen: soft, nontender, nondistended, positive bowel sounds; neg bowel movement; Neg N/V/D   :  pt voiding without difficulty   Extremities:   CALVILLO; neg LE edema, neg calf tenderness.   PPP bilaterally; rt SVG site cdi nakia  - grossly ecchymotic but soft

## 2021-02-23 NOTE — PROGRESS NOTE ADULT - PROBLEM SELECTOR PROBLEM 3
DM (diabetes mellitus)
CAD (coronary artery disease)
DM (diabetes mellitus)
CAD (coronary artery disease)
DM (diabetes mellitus)
CAD (coronary artery disease)
CAD (coronary artery disease)
Confusion
CAD (coronary artery disease)
DM (diabetes mellitus)
CAD (coronary artery disease)
Confusion
DM (diabetes mellitus)
DM (diabetes mellitus)
Confusion

## 2021-02-23 NOTE — PROGRESS NOTE ADULT - ASSESSMENT
Assessment  DMT2: 70y Female with DM T2 with hyperglycemia admitted with chest pain, A1C 9.2%, was on insulin at home, postop on basal bolus insulin, blood sugars trending down/had hypoglycemia yesterday, blood sugars improving today, she is eating meals, appears comfortable, planning DC home today.  CAD: CABG 2/17, on medications, monitored, stable.  Hypothyroidism: on Synthroid 100 mcg po daily, compliant with Synthroid intake, subclinical hypothyroid, FT4 1.4.  Overweight: No strict exercise routines, neither on low calorie diet.        Flor Torres MD  Cell: [phone]  Office: [phone]               Assessment  DMT2: 70y Female with DM T2 with hyperglycemia admitted with chest pain, A1C 9.2%, was on insulin at home, postop on  basal bolus insulin, blood sugars trending down/had hypoglycemia yesterday, blood sugars improving today, she is eating meals, appears comfortable, planning DC home today.  CAD: CABG 2/17, on medications, monitored, stable.  Hypothyroidism: on Synthroid 100 mcg po daily, compliant with Synthroid intake, subclinical hypothyroid, FT4 1.4.  Overweight: No strict exercise routines, neither on low calorie diet.        Flor Torres MD  Cell: [phone]  Office: [phone]

## 2021-02-23 NOTE — PROGRESS NOTE ADULT - PROBLEM SELECTOR PROBLEM 1
CAD (coronary artery disease)
DM (diabetes mellitus)
DM (diabetes mellitus)
S/P CABG x 3
DM (diabetes mellitus)
CAD (coronary artery disease)
DM (diabetes mellitus)
DM (diabetes mellitus)
S/P CABG x 3
CAD (coronary artery disease)
CAD (coronary artery disease)
DM (diabetes mellitus)
CAD (coronary artery disease)

## 2021-02-23 NOTE — DISCHARGE NOTE PROVIDER - NSDCFUADDAPPT_GEN_ALL_CORE_FT
Please follow up with your surgeon Dr. Hugo Adhikari on WEdnesday, March 3 @ 10:15am  Please follow up with regular clinic visits at [address] [place] [address] Neck   [phone]

## 2021-02-23 NOTE — DISCHARGE NOTE NURSING/CASE MANAGEMENT/SOCIAL WORK - PATIENT PORTAL LINK FT
You can access the FollowMyHealth Patient Portal offered by [facility] by registering at the following website: [email or web address]. By joining [employer]’s FollowMyHealth portal, you will also be able to view your health information using other applications (apps) compatible with our system.

## 2021-02-23 NOTE — DISCHARGE NOTE PROVIDER - NSDCCPCAREPLAN_GEN_ALL_CORE_FT
PRINCIPAL DISCHARGE DIAGNOSIS  Diagnosis: S/P CABG x 3  Assessment and Plan of Treatment: Refer to your Cardiac Surgery Do's & Don't Fact Sheet  shower daily - wash your incision with mild soap and water  weigh yourself daily - report weight gain > 2.5 pounds overnight to your MD  take your medications as prescribed  diabetic no added salt diet  keep your glucose levels < 150

## 2021-02-23 NOTE — PROGRESS NOTE ADULT - SUBJECTIVE AND OBJECTIVE BOX
Chief complaint  Patient is a 70y old  Female who presents with a chief complaint of 2/17/21 CABG x 3 w/ LIMA (23 Feb 2021 11:33)   Review of systems  Patient in bed, looks comfortable, had hypoglycemia yesterday.    Labs and Fingersticks  CAPILLARY BLOOD GLUCOSE      POCT Blood Glucose.: 169 mg/dL (23 Feb 2021 11:29)  POCT Blood Glucose.: 135 mg/dL (23 Feb 2021 08:04)  POCT Blood Glucose.: 91 mg/dL (22 Feb 2021 21:24)  POCT Blood Glucose.: 94 mg/dL (22 Feb 2021 17:23)  POCT Blood Glucose.: 77 mg/dL (22 Feb 2021 16:56)  POCT Blood Glucose.: 67 mg/dL (22 Feb 2021 16:55)      Anion Gap, Serum: 10 (02-23 @ 05:43)  Anion Gap, Serum: 11 (02-22 @ 07:11)      Calcium, Total Serum: 8.9 (02-23 @ 05:43)  Calcium, Total Serum: 9.0 (02-22 @ 07:11)  Albumin, Serum: 2.9 <L> (02-22 @ 07:11)    Alanine Aminotransferase (ALT/SGPT): 15 (02-22 @ 07:11)  Alkaline Phosphatase, Serum: 51 (02-22 @ 07:11)  Aspartate Aminotransferase (AST/SGOT): 22 (02-22 @ 07:11)        02-23    139  |  107  |  16  ----------------------------<  115<H>  4.0   |  22  |  0.79    Ca    8.9      23 Feb 2021 05:43    TPro  5.9<L>  /  Alb  2.9<L>  /  TBili  0.5  /  DBili  x   /  AST  22  /  ALT  15  /  AlkPhos  51  02-22                        10.7   13.15 )-----------( 299      ( 23 Feb 2021 05:43 )             33.6     Medications  MEDICATIONS  (STANDING):  aspirin enteric coated 81 milliGRAM(s) Oral daily  atorvastatin 40 milliGRAM(s) Oral at bedtime  chlorhexidine 2% Cloths 1 Application(s) Topical <User Schedule>  clopidogrel Tablet 75 milliGRAM(s) Oral daily  dextrose 40% Gel 15 Gram(s) Oral once  dextrose 50% Injectable 25 Gram(s) IV Push once  dorzolamide 2% Ophthalmic Solution 1 Drop(s) Both EYES <User Schedule>  enoxaparin Injectable 40 milliGRAM(s) SubCutaneous daily  glucagon  Injectable 1 milliGRAM(s) IntraMuscular once  insulin glargine Injectable (LANTUS) 26 Unit(s) SubCutaneous at bedtime  insulin lispro (ADMELOG) corrective regimen sliding scale   SubCutaneous three times a day before meals  insulin lispro (ADMELOG) corrective regimen sliding scale   SubCutaneous at bedtime  insulin lispro Injectable (ADMELOG) 6 Unit(s) SubCutaneous three times a day before meals  levothyroxine 100 MICROGram(s) Oral daily  pantoprazole    Tablet 40 milliGRAM(s) Oral before breakfast  polyethylene glycol 3350 17 Gram(s) Oral daily  senna 2 Tablet(s) Oral at bedtime  sodium chloride 0.9%. 1000 milliLiter(s) (10 mL/Hr) IV Continuous <Continuous>      Physical Exam  General: Patient comfortable in bed  Vital Signs Last 12 Hrs  T(F): 97.9 (02-23-21 @ 05:00), Max: 97.9 (02-23-21 @ 05:00)  HR: 88 (02-23-21 @ 12:00) (88 - 91)  BP: 118/72 (02-23-21 @ 12:00) (118/72 - 120/79)  BP(mean): --  RR: 16 (02-23-21 @ 05:00) (16 - 16)  SpO2: 98% (02-23-21 @ 05:00) (98% - 98%)  Neck: No palpable thyroid nodules.  CVS: S1S2, No murmurs  Respiratory: No wheezing, no crepitations  GI: Abdomen soft, bowel sounds positive  Musculoskeletal:  edema lower extremities.   Skin: No skin rashes, no ecchymosis    Diagnostics    Free Thyroxine, Serum: AM Sched. Collection: 15-Feb-2021 06:00 (02-14 @ 14:33)           Chief complaint  Patient is a 70y old  Female who presents with a chief complaint of 2/17/21 CABG x 3 w/ LIMA (23 Feb 2021 11:33)   Review of systems  Patient in bed, looks comfortable, had hypoglycemia yesterday.    Labs and Fingersticks  CAPILLARY BLOOD GLUCOSE      POCT Blood Glucose.: 169 mg/dL (23 Feb 2021 11:29)  POCT Blood Glucose.: 135 mg/dL (23 Feb 2021 08:04)  POCT Blood Glucose.: 91 mg/dL (22 Feb 2021 21:24)  POCT Blood Glucose.: 94 mg/dL (22 Feb 2021 17:23)  POCT Blood Glucose.: 77 mg/dL (22 Feb 2021 16:56)  POCT Blood Glucose.: 67 mg/dL (22 Feb 2021 16:55)      Anion Gap, Serum: 10 (02-23 @ 05:43)  Anion Gap, Serum: 11 (02-22 @ 07:11)      Calcium, Total Serum: 8.9 (02-23 @ 05:43)  Calcium, Total Serum: 9.0 (02-22 @ 07:11)  Albumin, Serum: 2.9 <L> (02-22 @ 07:11)    Alanine Aminotransferase (ALT/SGPT): 15 (02-22 @ 07:11)  Alkaline Phosphatase, Serum: 51 (02-22 @ 07:11)  Aspartate Aminotransferase (AST/SGOT): 22 (02-22 @ 07:11)        02-23    139  |  107  |  16  ----------------------------<  115<H>  4.0   |  22  |  0.79    Ca    8.9      23 Feb 2021 05:43    TPro  5.9<L>  /  Alb  2.9<L>  /  TBili  0.5  /  DBili  x   /  AST  22  /  ALT  15  /  AlkPhos  51  02-22                        10.7   13.15 )-----------( 299      ( 23 Feb 2021 05:43 )             33.6     Medications  MEDICATIONS  (STANDING):  aspirin enteric coated 81 milliGRAM(s) Oral daily  atorvastatin 40 milliGRAM(s) Oral at bedtime  chlorhexidine 2% Cloths 1 Application(s) Topical <User Schedule>  clopidogrel Tablet 75 milliGRAM(s) Oral daily  dextrose 40% Gel 15 Gram(s) Oral once  dextrose 50% Injectable 25 Gram(s) IV Push once  dorzolamide 2% Ophthalmic Solution 1 Drop(s) Both EYES <User Schedule>  enoxaparin Injectable 40 milliGRAM(s) SubCutaneous daily  glucagon  Injectable 1 milliGRAM(s) IntraMuscular once  insulin glargine Injectable (LANTUS) 26 Unit(s) SubCutaneous at bedtime  insulin lispro (ADMELOG) corrective regimen sliding scale   SubCutaneous three times a day before meals  insulin lispro (ADMELOG) corrective regimen sliding scale   SubCutaneous at bedtime  insulin lispro Injectable (ADMELOG) 6 Unit(s) SubCutaneous three times a day before meals  levothyroxine 100 MICROGram(s) Oral daily  pantoprazole    Tablet 40 milliGRAM(s) Oral before breakfast  polyethylene glycol 3350 17 Gram(s) Oral daily  senna 2 Tablet(s) Oral at bedtime  sodium chloride 0.9%. 1000 milliLiter(s) (10 mL/Hr) IV Continuous <Continuous>      Physical Exam  General: Patient comfortable in bed  Vital Signs Last 12 Hrs  T(F): 97.9 (02-23-21 @ 05:00), Max: 97.9 (02-23-21 @ 05:00)  HR: 88 (02-23-21 @ 12:00) (88 - 91)  BP: 118/72 (02-23-21 @ 12:00) (118/72 - 120/79)  BP(mean): --  RR: 16 (02-23-21 @ 05:00) (16 - 16)  SpO2: 98% (02-23-21 @ 05:00) (98% - 98%)  Neck: No palpable thyroid nodules.  CVS: S1S2, No murmurs  Respiratory: No wheezing, no crepitations  GI: Abdomen soft, bowel sounds positive  Musculoskeletal:  edema lower extremities.   Skin: No skin rashes, no ecchymosis    Diagnostics    Free Thyroxine, Serum: AM Sched. Collection: 15-Feb-2021 06:00 (02-14 @ 14:33)

## 2021-02-24 ENCOUNTER — NON-APPOINTMENT (OUTPATIENT)
Age: 71
End: 2021-02-24

## 2021-02-24 DIAGNOSIS — Z86.39 PERSONAL HISTORY OF OTHER ENDOCRINE, NUTRITIONAL AND METABOLIC DISEASE: ICD-10-CM

## 2021-02-24 DIAGNOSIS — I21.4 NON-ST ELEVATION (NSTEMI) MYOCARDIAL INFARCTION: ICD-10-CM

## 2021-02-24 DIAGNOSIS — Z86.79 PERSONAL HISTORY OF OTHER DISEASES OF THE CIRCULATORY SYSTEM: ICD-10-CM

## 2021-02-24 DIAGNOSIS — Z87.891 PERSONAL HISTORY OF NICOTINE DEPENDENCE: ICD-10-CM

## 2021-02-24 DIAGNOSIS — E11.8 TYPE 2 DIABETES MELLITUS WITH UNSPECIFIED COMPLICATIONS: ICD-10-CM

## 2021-02-24 RX ORDER — CLOPIDOGREL 75 MG/1
75 TABLET, FILM COATED ORAL
Refills: 0 | Status: ACTIVE | COMMUNITY

## 2021-02-24 RX ORDER — LEVOTHYROXINE SODIUM 0.1 MG/1
100 TABLET ORAL
Refills: 0 | Status: ACTIVE | COMMUNITY

## 2021-02-24 RX ORDER — CYCLOBENZAPRINE HYDROCHLORIDE 5 MG/1
5 TABLET, FILM COATED ORAL
Refills: 0 | Status: ACTIVE | COMMUNITY

## 2021-02-24 RX ORDER — DORZOLAMIDE HYDROCHLORIDE 20 MG/ML
2 SOLUTION OPHTHALMIC
Refills: 0 | Status: ACTIVE | COMMUNITY

## 2021-02-24 RX ORDER — SENNOSIDES 8.6 MG TABLETS 8.6 MG/1
8.6 TABLET ORAL
Refills: 0 | Status: ACTIVE | COMMUNITY

## 2021-02-24 RX ORDER — INSULIN GLARGINE 100 [IU]/ML
100 INJECTION, SOLUTION SUBCUTANEOUS
Refills: 0 | Status: ACTIVE | COMMUNITY

## 2021-02-24 RX ORDER — ATORVASTATIN CALCIUM 40 MG/1
40 TABLET, FILM COATED ORAL
Refills: 0 | Status: ACTIVE | COMMUNITY

## 2021-02-24 RX ORDER — INSULIN GLULISINE 100 [IU]/ML
100 INJECTION, SOLUTION SUBCUTANEOUS
Refills: 0 | Status: ACTIVE | COMMUNITY

## 2021-02-24 RX ORDER — PANTOPRAZOLE 40 MG/1
40 TABLET, DELAYED RELEASE ORAL
Refills: 0 | Status: ACTIVE | COMMUNITY

## 2021-02-24 RX ORDER — METFORMIN HYDROCHLORIDE 850 MG/1
850 TABLET, COATED ORAL TWICE DAILY
Refills: 0 | Status: ACTIVE | COMMUNITY

## 2021-02-25 ENCOUNTER — APPOINTMENT (OUTPATIENT)
Dept: CARE COORDINATION | Facility: HOME HEALTH | Age: 71
End: 2021-02-25
Payer: MEDICAID

## 2021-02-25 VITALS
RESPIRATION RATE: 15 BRPM | HEART RATE: 68 BPM | DIASTOLIC BLOOD PRESSURE: 68 MMHG | OXYGEN SATURATION: 97 % | SYSTOLIC BLOOD PRESSURE: 118 MMHG

## 2021-02-25 PROCEDURE — 99024 POSTOP FOLLOW-UP VISIT: CPT

## 2021-02-25 NOTE — PHYSICAL EXAM
[Sclera] : the sclera and conjunctiva were normal [Neck Appearance] : the appearance of the neck was normal [Respiration, Rhythm And Depth] : normal respiratory rhythm and effort [Auscultation Breath Sounds / Voice Sounds] : lungs were clear to auscultation bilaterally [Apical Impulse] : the apical impulse was normal [Heart Rate And Rhythm] : heart rate was normal and rhythm regular [Heart Sounds] : normal S1 and S2 [Murmurs] : no murmurs [Heart Sounds Pericardial Friction Rub] : no pericardial rub [Chest Visual Inspection Thoracic Asymmetry] : no chest asymmetry [1+] : left 1+ [FreeTextEntry2] : B/L LE without edema, B/L calves soft, NT [Abdomen Soft] : soft [Abdomen Tenderness] : non-tender [Abnormal Walk] : normal gait [Musculoskeletal - Swelling] : no joint swelling seen [Skin Color & Pigmentation] : normal skin color and pigmentation [Skin Turgor] : normal skin turgor [] : no rash [Oriented To Time, Place, And Person] : oriented to person, place, and time [Impaired Insight] : insight and judgment were intact [Affect] : the affect was normal [FreeTextEntry1] : P

## 2021-02-25 NOTE — REASON FOR VISIT
[Follow-Up: _____] : a [unfilled] follow-up visit [Family Member] : family member [FreeTextEntry1] : FOLLOW YOUR HEART- Transitional Care Management Program- [facility]\par \par

## 2021-02-25 NOTE — ASSESSMENT
[FreeTextEntry1] : Pt recovering well at home s/p OHS. Reviewed all medications and dosages with pt understanding. Pt only taking 20 units of Lantus as opposed to 30 units ordered. AM  today, pt agrees to increase to 25 and advance to 30 units as initially prescribed for BG greater than 150 in AM fasting. BG range 142-172. Pt wt as per dtr has been stable within one lb or two, advised to continue to log with BG. Pt dtr dispensing meds, has only been giving one toprol xl of 50 mg daily did not realize it was supposed to be 3 tabs. Given pt regular HR and normal BP, advised to continue with one tab only daily. CTS team made aware. Pain controlled with current medication regimen. Encouraged use of Tylenol 650 mg every 6 hours for pain relief and Oxycodone only for severe pain. Reviewed diabetic and cardiac diet at length with pt and pt dtr. No further new symptoms, issues or concerns to report at this time.\par

## 2021-02-25 NOTE — HISTORY OF PRESENT ILLNESS
[FreeTextEntry1] : This is a 69 y/o female with significant PMHx of former 5 pack year cigarette \par smoker, T2DM (HgA1c 9.2 this admission) managed by endocrinologist in [place], \par denies complications), hypothyroidism, HTN, HLD admitted to [facility] on 2/8/2021 \par with NSTEMI and transferred to [facility] 2/11/21 for LHC with Dr. Duggan.  LHC revealed \par 80-90% stenosis to LAD, 80-90% stenosis to prox diag, 80-90% to prox Cx, 70-80 \par to mid/distal RCA. CTS consult called to evaluate patient for cardiac surgery. \par She is now post op from CABGx3. [facility] Dr. Adhikari on 2/17/21. Post op course included hyperglycemia, now improved. Pt discharged home with support of dtr and home care services. Initial [facility] visit completed in person. Pt refused  services, preferred dtr assist with translation.\par CC "I"m feeling better every day" [Diabetes Mellitus] : Diabetes Mellitus

## 2021-02-27 ENCOUNTER — EMERGENCY (EMERGENCY)
Facility: HOSPITAL | Age: 71
LOS: 1 days | Discharge: HOME | End: 2021-02-27
Attending: EMERGENCY MEDICINE
Payer: MEDICARE

## 2021-02-27 ENCOUNTER — TRANSCRIPTION ENCOUNTER (OUTPATIENT)
Age: 71
End: 2021-02-27

## 2021-02-27 VITALS
HEART RATE: 78 BPM | SYSTOLIC BLOOD PRESSURE: 117 MMHG | TEMPERATURE: 98 F | RESPIRATION RATE: 20 BRPM | DIASTOLIC BLOOD PRESSURE: 73 MMHG | OXYGEN SATURATION: 100 %

## 2021-02-27 VITALS
HEIGHT: 59.45 IN | TEMPERATURE: 98 F | OXYGEN SATURATION: 100 % | WEIGHT: 166.01 LBS | SYSTOLIC BLOOD PRESSURE: 116 MMHG | HEART RATE: 113 BPM | DIASTOLIC BLOOD PRESSURE: 70 MMHG | RESPIRATION RATE: 18 BRPM

## 2021-02-27 DIAGNOSIS — Z90.710 ACQUIRED ABSENCE OF BOTH CERVIX AND UTERUS: Chronic | ICD-10-CM

## 2021-02-27 LAB
ALBUMIN SERPL ELPH-MCNC: 3.5 G/DL — SIGNIFICANT CHANGE UP (ref 3.3–5)
ALP SERPL-CCNC: 72 U/L — SIGNIFICANT CHANGE UP (ref 40–120)
ALT FLD-CCNC: 11 U/L — SIGNIFICANT CHANGE UP (ref 10–45)
ANION GAP SERPL CALC-SCNC: 14 MMOL/L — SIGNIFICANT CHANGE UP (ref 5–17)
AST SERPL-CCNC: 16 U/L — SIGNIFICANT CHANGE UP (ref 10–40)
BASOPHILS # BLD AUTO: 0.02 K/UL — SIGNIFICANT CHANGE UP (ref 0–0.2)
BASOPHILS NFR BLD AUTO: 0.2 % — SIGNIFICANT CHANGE UP (ref 0–2)
BILIRUB SERPL-MCNC: 0.5 MG/DL — SIGNIFICANT CHANGE UP (ref 0.2–1.2)
BUN SERPL-MCNC: 14 MG/DL — SIGNIFICANT CHANGE UP (ref 7–23)
CALCIUM SERPL-MCNC: 9.6 MG/DL — SIGNIFICANT CHANGE UP (ref 8.4–10.5)
CHLORIDE SERPL-SCNC: 101 MMOL/L — SIGNIFICANT CHANGE UP (ref 96–108)
CO2 SERPL-SCNC: 20 MMOL/L — LOW (ref 22–31)
CREAT SERPL-MCNC: 0.78 MG/DL — SIGNIFICANT CHANGE UP (ref 0.5–1.3)
EOSINOPHIL # BLD AUTO: 0.26 K/UL — SIGNIFICANT CHANGE UP (ref 0–0.5)
EOSINOPHIL NFR BLD AUTO: 2.7 % — SIGNIFICANT CHANGE UP (ref 0–6)
GLUCOSE SERPL-MCNC: 171 MG/DL — HIGH (ref 70–99)
HCT VFR BLD CALC: 34.3 % — LOW (ref 34.5–45)
HGB BLD-MCNC: 10.6 G/DL — LOW (ref 11.5–15.5)
IMM GRANULOCYTES NFR BLD AUTO: 0.6 % — SIGNIFICANT CHANGE UP (ref 0–1.5)
INR BLD: 1.08 RATIO — SIGNIFICANT CHANGE UP (ref 0.88–1.16)
LYMPHOCYTES # BLD AUTO: 1.8 K/UL — SIGNIFICANT CHANGE UP (ref 1–3.3)
LYMPHOCYTES # BLD AUTO: 18.5 % — SIGNIFICANT CHANGE UP (ref 13–44)
MCHC RBC-ENTMCNC: 29 PG — SIGNIFICANT CHANGE UP (ref 27–34)
MCHC RBC-ENTMCNC: 30.9 GM/DL — LOW (ref 32–36)
MCV RBC AUTO: 94 FL — SIGNIFICANT CHANGE UP (ref 80–100)
MONOCYTES # BLD AUTO: 0.99 K/UL — HIGH (ref 0–0.9)
MONOCYTES NFR BLD AUTO: 10.2 % — SIGNIFICANT CHANGE UP (ref 2–14)
NEUTROPHILS # BLD AUTO: 6.62 K/UL — SIGNIFICANT CHANGE UP (ref 1.8–7.4)
NEUTROPHILS NFR BLD AUTO: 67.8 % — SIGNIFICANT CHANGE UP (ref 43–77)
NRBC # BLD: 0 /100 WBCS — SIGNIFICANT CHANGE UP (ref 0–0)
PLATELET # BLD AUTO: 375 K/UL — SIGNIFICANT CHANGE UP (ref 150–400)
POTASSIUM SERPL-MCNC: 3.9 MMOL/L — SIGNIFICANT CHANGE UP (ref 3.5–5.3)
POTASSIUM SERPL-SCNC: 3.9 MMOL/L — SIGNIFICANT CHANGE UP (ref 3.5–5.3)
PROT SERPL-MCNC: 7.1 G/DL — SIGNIFICANT CHANGE UP (ref 6–8.3)
PROTHROM AB SERPL-ACNC: 12.9 SEC — SIGNIFICANT CHANGE UP (ref 10.6–13.6)
RBC # BLD: 3.65 M/UL — LOW (ref 3.8–5.2)
RBC # FLD: 13.8 % — SIGNIFICANT CHANGE UP (ref 10.3–14.5)
SARS-COV-2 RNA SPEC QL NAA+PROBE: SIGNIFICANT CHANGE UP
SODIUM SERPL-SCNC: 135 MMOL/L — SIGNIFICANT CHANGE UP (ref 135–145)
TROPONIN T, HIGH SENSITIVITY RESULT: 24 NG/L — SIGNIFICANT CHANGE UP (ref 0–51)
TROPONIN T, HIGH SENSITIVITY RESULT: 27 NG/L — SIGNIFICANT CHANGE UP (ref 0–51)
WBC # BLD: 9.75 K/UL — SIGNIFICANT CHANGE UP (ref 3.8–10.5)
WBC # FLD AUTO: 9.75 K/UL — SIGNIFICANT CHANGE UP (ref 3.8–10.5)

## 2021-02-27 PROCEDURE — 71045 X-RAY EXAM CHEST 1 VIEW: CPT | Mod: 26

## 2021-02-27 PROCEDURE — 71045 X-RAY EXAM CHEST 1 VIEW: CPT

## 2021-02-27 PROCEDURE — 85025 COMPLETE CBC W/AUTO DIFF WBC: CPT

## 2021-02-27 PROCEDURE — 93010 ELECTROCARDIOGRAM REPORT: CPT

## 2021-02-27 PROCEDURE — 99284 EMERGENCY DEPT VISIT MOD MDM: CPT | Mod: 25

## 2021-02-27 PROCEDURE — 84484 ASSAY OF TROPONIN QUANT: CPT

## 2021-02-27 PROCEDURE — U0005: CPT

## 2021-02-27 PROCEDURE — 80053 COMPREHEN METABOLIC PANEL: CPT

## 2021-02-27 PROCEDURE — 93005 ELECTROCARDIOGRAM TRACING: CPT

## 2021-02-27 PROCEDURE — 99285 EMERGENCY DEPT VISIT HI MDM: CPT

## 2021-02-27 PROCEDURE — U0003: CPT

## 2021-02-27 PROCEDURE — 85610 PROTHROMBIN TIME: CPT

## 2021-02-27 PROCEDURE — 99053 MED SERV 10PM-8AM 24 HR FAC: CPT

## 2021-02-27 RX ORDER — ACETAMINOPHEN 500 MG
650 TABLET ORAL ONCE
Refills: 0 | Status: COMPLETED | OUTPATIENT
Start: 2021-02-27 | End: 2021-02-27

## 2021-02-27 RX ORDER — ALPRAZOLAM 0.25 MG
1 TABLET ORAL
Qty: 5 | Refills: 0
Start: 2021-02-27 | End: 2021-03-03

## 2021-02-27 RX ORDER — OXYCODONE HYDROCHLORIDE 5 MG/1
5 TABLET ORAL ONCE
Refills: 0 | Status: DISCONTINUED | OUTPATIENT
Start: 2021-02-27 | End: 2021-02-27

## 2021-02-27 RX ADMIN — Medication 650 MILLIGRAM(S): at 10:28

## 2021-02-27 NOTE — ED ADULT NURSE REASSESSMENT NOTE - NS ED NURSE REASSESS COMMENT FT1
Repeat troponin completed, pending result, patient continues to complain of chest pain, MD White made aware. Will continue to monitor. SHAY Alvarez

## 2021-02-27 NOTE — ED ADULT NURSE NOTE - OBJECTIVE STATEMENT
Pt is a AAOx3, Kuwaiti speaking, 70y female c/o generalized chest pain radiating to her right arm and neck, as well as palpitations since midnight tonight. Pt recently d/c' d from [facility] x 3 days ago s/p CABG procedure. Pt states that she took her pain medication as prescribed but felt no relief tonight so came to ED for f/u. C/o generalized weakness. NSR on cardiac monitor. Denies cp, palpitations, sob, cough, n/v, diarrhea, chills or fevers.

## 2021-02-27 NOTE — ED ADULT NURSE REASSESSMENT NOTE - NS ED NURSE REASSESS COMMENT FT1
Patient resting in bed alert and oriented x 4, Korean speaking mostly, no acute distress noted, respirations even and nonlabored, breathing spontaneously on room air. +complaints of 5/10 chest pain, denies dizziness, shortness of breath and all other complaints. Patient updated on plan of care. Will continue to monitor. SHAY Alvarez

## 2021-02-27 NOTE — ED PROVIDER NOTE - PHYSICAL EXAMINATION
GENERAL: no acute distress, non-toxic appearing  HEENT: normal conjunctiva, oral mucosa moist  CARDIAC: tachy rate and regular rhythm, normal S1 and S2, no appreciable murmurs, 2+ equal radial pulses  PULM: clear to ascultation bilaterally, sats well on RA, no incr wob  GI: abdomen nondistended, soft, nontender  : no suprapubic tenderness  NEURO: alert and oriented x 3, normal speech, moving all extremities without lateralization  MSK: no visible deformities, no peripheral edema, calf tenderness/redness/swelling  SKIN: no visible rashes, surgical site c/d/i  PSYCH: appropriate mood and affect

## 2021-02-27 NOTE — ED PROVIDER NOTE - OBJECTIVE STATEMENT
70F PMH HTN, DM, CAD s/p CABG 10 days ago, presents to the ED with chest pain that started 12am awaking her from sleep, states she tried taking the pain medication that she was given for post-op pain and it didn't provide relief like it usually does. Pain radiates to shoulder and neck. Denies any associated sob, lightheadedness, diaphoresis, nausea. Denies any fevers/chills, cough, uri sxs, abd pain, v/d, dark/bloody stools. Pt also states she took aspirin prior to arrival.    Done with , ID in RN's note

## 2021-02-27 NOTE — ED CLERICAL - NS ED CLERK NOTE PRE-ARRIVAL INFORMATION; ADDITIONAL PRE-ARRIVAL INFORMATION
This patient is enrolled in the Follow Your Heart program and has undergone a cardiac surgery procedure within the last 30 days and has active care navigation.   This patient can be followed up by the care navigation team within 24 hours. To arrange close follow-up or to obtain additional clinical information about this patient, please call the contact number above.   Please call the cardiac surgery team once patient is registered at [phone] for consultation PRIOR to disposition decision.  The patient recently underwent a cardiac surgery procedure and the team can assist in acute medical management.

## 2021-02-27 NOTE — ED PROVIDER NOTE - NSFOLLOWUPINSTRUCTIONS_ED_ALL_ED_FT
Chest Pain    Chest pain can be caused by many different conditions which may or may not be dangerous. Causes include heartburn, lung infections, heart attack, blood clot in lungs, skin infections, strain or damage to muscle, cartilage, or bones, etc. In addition to a history and physical examination, an electrocardiogram (ECG) or other lab tests may have been performed to determine the cause of your chest pain. Follow up with your primary care provider or with a cardiologist as instructed.     SEEK IMMEDIATE MEDICAL CARE IF YOU HAVE ANY OF THE FOLLOWING SYMPTOMS: worsening chest pain, coughing up blood, unexplained back/neck/jaw pain, severe abdominal pain, dizziness or lightheadedness, fainting, shortness of breath, sweaty or clammy skin, vomiting, or racing heart beat. These symptoms may represent a serious problem that is an emergency. Do not wait to see if the symptoms will go away. Get medical help right away. Call 911 and do not drive yourself to the hospital.     1) Follow up with your doctor this week. Please see Dr. Adhikari at your appointment on 3/3/21  2) Return to the ED immediately for new or worsening symptoms   3) Please continue to take any home medications as prescribed  4) Your test results from your ED visit were discussed with you prior to discharge  5) You were provided with a copy of your test results  6) Please  your medications prescribed by the surgery team. They were sent to the [facility] located in the main lobby of this hospital    Dolor de pecho    El dolor de pecho puede ser causado por muchas condiciones diferentes que pueden ser peligrosas o no. Las causas incluyen acidez de estómago, infecciones pulmonares, infarto de miocardio, coágulos de ashley en los pulmones, infecciones de la piel, distensión o daño a los músculos, cartílagos o huesos, etc. Además de la historia clínica y el examen físico, es posible que se realice un electrocardiograma (ECG) u otras pruebas de laboratorio. se whiting realizado para determinar la causa de [place] dolor de pecho. Jemal un seguimiento con [place] proveedor de atención primaria o con un cardiólogo según las instrucciones.    BUSQUE ATENCIÓN MÉDICA INMEDIATA SI TIENE ALGUNO DE LOS SIGUIENTES SÍNTOMAS: empeoramiento del dolor en el pecho, tos con ashley, dolor inexplicable de espalda / richard / mandíbula, dolor abdominal intenso, mareos o aturdimiento, desmayo, dificultad para respirar, piel sudorosa o húmeda, vómitos o latidos cardíacos acelerados. Estos síntomas pueden representar un problema grave que sea palmira emergencia. No espere a bekah si los síntomas desaparecen. Busque ayuda médica de inmediato. Llame al 911 y no conduzca al hospital.    1) Jemal un seguimiento con [place] médico esta semana. Consulte al Dr. Adhikari en [place] are[place] el 3/3/21  2) Regrese al servicio de urgencias inmediatamente si tiene síntomas nuevos o que empeoran  3) Continúe tomando los medicamentos en casa según lo prescrito.  4) Los resultados de las pruebas de [place] visita al servicio de urgencias se analizaron con usted antes del lindsey.  5) Se le proporcionó palmira copia de los resultados de [place] prueba.  6) Por favor, recoja dennis medicamentos recetados por el equipo de cirugía. Fueron enviados a la [facility] ubicada en el lobby principal de anali hospital

## 2021-02-27 NOTE — ED ADULT NURSE REASSESSMENT NOTE - NS ED NURSE REASSESS COMMENT FT1
Patient resting in bed comfortably, denies chest pain, shortness of breath and all other complaints. Pending further orders. Will continue to monitor. SHAY Alvarez

## 2021-02-27 NOTE — ED PROVIDER NOTE - PROGRESS NOTE DETAILS
Klaudia, PGY2: spoke with CT surg team, spectra [id number] and [id number], they recommend giving pt 5mg oxycodone, CXR to eval ptx, they will be down shortly to eval pt. Dr. Deni White, PGY-3: received sign out on this pt pending CT surg eval. Seen by CT surg, awaiting final recs however pre-rivers likely DC home. Dr. Deni White, PGY-3: cleared for DC by CT surg. Will follow up at appointment scheduled for 3/3. Xanax sent to pharmacy by CT surg. Spoke w/ pt w/  [phone]. Reviewed results and discharge plan. Return precautions provided, pt verbalized understanding. Ready for DC.

## 2021-02-27 NOTE — ED PROVIDER NOTE - PATIENT PORTAL LINK FT
You can access the FollowMyHealth Patient Portal offered by [facility] by registering at the following website: [email or web address]. By joining [email or web address]’s FollowMyHealth portal, you will also be able to view your health information using other applications (apps) compatible with our system.

## 2021-02-27 NOTE — ED PROVIDER NOTE - ATTENDING CONTRIBUTION TO CARE
attending Fly: 70yF h/o HTN, DM, CAD s/p CABG 10 days ago, p/w chest pain that awoke her from sleep at midnight. Reports similar pain in the postop period but is usually resolved with pain medications. Radiates to shoulder, neck and R arm. Surgical site c/d/i. Mild tachycardia on arrival. Will obtain ekg, place on tele, labs including trop, cxr, discuss with CT surgery for possible echo, reassess

## 2021-02-27 NOTE — ED ADULT NURSE NOTE - NSIMPLEMENTINTERV_GEN_ALL_ED
Implemented All Universal Safety Interventions:  Arp to call system. Call bell, personal items and telephone within reach. Instruct patient to call for assistance. Room bathroom lighting operational. Non-slip footwear when patient is off stretcher. Physically safe environment: no spills, clutter or unnecessary equipment. Stretcher in lowest position, wheels locked, appropriate side rails in place.

## 2021-02-27 NOTE — ED PROVIDER NOTE - CLINICAL SUMMARY MEDICAL DECISION MAKING FREE TEXT BOX
Likely post op healing pain. Concern for pericarditis/effusion/ptx less suspicion post cabg infarction/aortic catastrophe. Will do labs, ekg, cxr, pain meds, trop. CT surg cs.

## 2021-02-27 NOTE — ED ADULT NURSE NOTE - CAS ELECT INFOMATION PROVIDED
Discharge instructions provided to patient, patient verbalized understanding of discharge instructions, ambulatory out of er with steady gait. Kim RN/DC instructions

## 2021-02-27 NOTE — CHART NOTE - NSCHARTNOTEFT_GEN_A_CORE
Called to ED to evaluate Ms. Amaya s/p CABG 2/17 Patient presents to ED with c/o CP  Patient seen and examined in no acute distress.     NSR S1 S2 RRR  MTI DAISHA   Lungs clear  room air  ab soft nontender +BS  voids   RT thigh with ecchymosis - improving   equal strengththroughout   B/le warm well perfused no edema    T(C): 36.5 (02-27-21 @ 07:30), Max: 36.7 (02-27-21 @ 05:41)  T(F): 97.7 (02-27-21 @ 07:30), Max: 98.1 (02-27-21 @ 05:41)  HR: 88 (02-27-21 @ 07:30) (88 - 113)  BP: 114/72 (02-27-21 @ 07:30) (114/72 - 116/70)  RR: 18 (02-27-21 @ 07:30) (18 - 19)  SpO2: 100% (02-27-21 @ 07:30) (98% - 100%)                         10.6   9.75  )-----------( 375      ( 27 Feb 2021 05:50 )             34.3   02-27    135  |  101  |  14  ----------------------------<  171<H>  3.9   |  20<L>  |  0.78    Ca    9.6      27 Feb 2021 05:50    TPro  7.1  /  Alb  3.5  /  TBili  0.5  /  DBili  x   /  AST  16  /  ALT  11  /  AlkPhos  72  02-27  tropinin 27     Sutures removed dermabond tape removed-  incisions pristine with stable sternum.  Negative cardiac biomarkers XRAY  with clear lungs   All labs and radiographic images reviewed by Dr. Cosby    Patient endorse feeling anxious- has scheduled follow up with Dr Adhikari on 3/3 10:15     Plan:    Please discharge patient home.  Resume discharge medications  Xana0.25 x5 tabs prn one tab daily -  [facility]    Chayo De La Cruz CT NP  [id number]

## 2021-03-03 ENCOUNTER — APPOINTMENT (OUTPATIENT)
Dept: CARDIOTHORACIC SURGERY | Facility: CLINIC | Age: 71
End: 2021-03-03
Payer: MEDICAID

## 2021-03-03 ENCOUNTER — LABORATORY RESULT (OUTPATIENT)
Age: 71
End: 2021-03-03

## 2021-03-03 VITALS
DIASTOLIC BLOOD PRESSURE: 60 MMHG | WEIGHT: 164 LBS | HEART RATE: 70 BPM | BODY MASS INDEX: 30.96 KG/M2 | RESPIRATION RATE: 16 BRPM | TEMPERATURE: 98 F | HEIGHT: 61 IN | OXYGEN SATURATION: 98 % | SYSTOLIC BLOOD PRESSURE: 109 MMHG

## 2021-03-03 DIAGNOSIS — R30.0 DYSURIA: ICD-10-CM

## 2021-03-03 LAB
APPEARANCE: ABNORMAL
BILIRUBIN URINE: NEGATIVE
BLOOD URINE: NEGATIVE
COLOR: YELLOW
GLUCOSE QUALITATIVE U: NEGATIVE
KETONES URINE: NEGATIVE
LEUKOCYTE ESTERASE URINE: ABNORMAL
NITRITE URINE: NEGATIVE
PH URINE: 6
PROTEIN URINE: ABNORMAL
SPECIFIC GRAVITY URINE: 1.02
UROBILINOGEN URINE: NORMAL

## 2021-03-03 PROCEDURE — 99024 POSTOP FOLLOW-UP VISIT: CPT

## 2021-03-03 RX ORDER — METOPROLOL SUCCINATE 50 MG/1
50 TABLET, EXTENDED RELEASE ORAL
Refills: 0 | Status: ACTIVE | COMMUNITY

## 2021-03-16 PROBLEM — Z95.1 S/P CORONARY ARTERY BYPASS GRAFT X 3: Status: ACTIVE | Noted: 2021-02-24

## 2021-03-17 ENCOUNTER — APPOINTMENT (OUTPATIENT)
Dept: CARDIOTHORACIC SURGERY | Facility: CLINIC | Age: 71
End: 2021-03-17
Payer: MEDICAID

## 2021-03-17 VITALS
DIASTOLIC BLOOD PRESSURE: 85 MMHG | WEIGHT: 166 LBS | BODY MASS INDEX: 31.34 KG/M2 | OXYGEN SATURATION: 98 % | HEART RATE: 84 BPM | RESPIRATION RATE: 14 BRPM | HEIGHT: 61 IN | SYSTOLIC BLOOD PRESSURE: 134 MMHG | TEMPERATURE: 98 F

## 2021-03-17 DIAGNOSIS — Z95.1 PRESENCE OF AORTOCORONARY BYPASS GRAFT: ICD-10-CM

## 2021-03-17 PROCEDURE — 99024 POSTOP FOLLOW-UP VISIT: CPT

## 2021-03-17 RX ORDER — ALPRAZOLAM 0.25 MG/1
0.25 TABLET ORAL
Refills: 0 | Status: COMPLETED | COMMUNITY
Start: 2021-03-03 | End: 2021-03-17

## 2021-03-17 RX ORDER — CIPROFLOXACIN HYDROCHLORIDE 250 MG/1
250 TABLET, FILM COATED ORAL
Qty: 6 | Refills: 0 | Status: COMPLETED | COMMUNITY
Start: 2021-03-03 | End: 2021-03-17

## 2021-03-17 RX ORDER — OXYCODONE HYDROCHLORIDE 5 MG/1
5 CAPSULE ORAL
Refills: 0 | Status: COMPLETED | COMMUNITY
End: 2021-03-17

## 2021-03-17 RX ORDER — OXYCODONE 5 MG/1
5 TABLET ORAL TWICE DAILY
Qty: 12 | Refills: 0 | Status: COMPLETED | COMMUNITY
Start: 2021-03-04 | End: 2021-03-17

## 2021-03-25 ENCOUNTER — TRANSCRIPTION ENCOUNTER (OUTPATIENT)
Age: 71
End: 2021-03-25

## 2021-04-06 ENCOUNTER — APPOINTMENT (OUTPATIENT)
Dept: INTERNAL MEDICINE | Facility: CLINIC | Age: 71
End: 2021-04-06

## 2021-06-22 PROCEDURE — P9016: CPT

## 2021-06-22 PROCEDURE — 82330 ASSAY OF CALCIUM: CPT

## 2021-06-22 PROCEDURE — 86891 AUTOLOGOUS BLOOD OP SALVAGE: CPT

## 2021-06-22 PROCEDURE — 97116 GAIT TRAINING THERAPY: CPT

## 2021-06-22 PROCEDURE — 71046 X-RAY EXAM CHEST 2 VIEWS: CPT

## 2021-06-22 PROCEDURE — 83605 ASSAY OF LACTIC ACID: CPT

## 2021-06-22 PROCEDURE — 86901 BLOOD TYPING SEROLOGIC RH(D): CPT

## 2021-06-22 PROCEDURE — 84439 ASSAY OF FREE THYROXINE: CPT

## 2021-06-22 PROCEDURE — 85018 HEMOGLOBIN: CPT

## 2021-06-22 PROCEDURE — 71045 X-RAY EXAM CHEST 1 VIEW: CPT

## 2021-06-22 PROCEDURE — U0003: CPT

## 2021-06-22 PROCEDURE — 87640 STAPH A DNA AMP PROBE: CPT

## 2021-06-22 PROCEDURE — 99152 MOD SED SAME PHYS/QHP 5/>YRS: CPT

## 2021-06-22 PROCEDURE — 84480 ASSAY TRIIODOTHYRONINE (T3): CPT

## 2021-06-22 PROCEDURE — 84295 ASSAY OF SERUM SODIUM: CPT

## 2021-06-22 PROCEDURE — 93005 ELECTROCARDIOGRAM TRACING: CPT

## 2021-06-22 PROCEDURE — 84484 ASSAY OF TROPONIN QUANT: CPT

## 2021-06-22 PROCEDURE — 82553 CREATINE MB FRACTION: CPT

## 2021-06-22 PROCEDURE — 82550 ASSAY OF CK (CPK): CPT

## 2021-06-22 PROCEDURE — 99153 MOD SED SAME PHYS/QHP EA: CPT

## 2021-06-22 PROCEDURE — 85730 THROMBOPLASTIN TIME PARTIAL: CPT

## 2021-06-22 PROCEDURE — 85027 COMPLETE CBC AUTOMATED: CPT

## 2021-06-22 PROCEDURE — P9045: CPT

## 2021-06-22 PROCEDURE — C1894: CPT

## 2021-06-22 PROCEDURE — C1889: CPT

## 2021-06-22 PROCEDURE — 84436 ASSAY OF TOTAL THYROXINE: CPT

## 2021-06-22 PROCEDURE — C1769: CPT

## 2021-06-22 PROCEDURE — 97530 THERAPEUTIC ACTIVITIES: CPT

## 2021-06-22 PROCEDURE — 82435 ASSAY OF BLOOD CHLORIDE: CPT

## 2021-06-22 PROCEDURE — 85610 PROTHROMBIN TIME: CPT

## 2021-06-22 PROCEDURE — 86900 BLOOD TYPING SEROLOGIC ABO: CPT

## 2021-06-22 PROCEDURE — 80048 BASIC METABOLIC PNL TOTAL CA: CPT

## 2021-06-22 PROCEDURE — 84100 ASSAY OF PHOSPHORUS: CPT

## 2021-06-22 PROCEDURE — 86850 RBC ANTIBODY SCREEN: CPT

## 2021-06-22 PROCEDURE — 93880 EXTRACRANIAL BILAT STUDY: CPT

## 2021-06-22 PROCEDURE — 94010 BREATHING CAPACITY TEST: CPT

## 2021-06-22 PROCEDURE — 82803 BLOOD GASES ANY COMBINATION: CPT

## 2021-06-22 PROCEDURE — 84132 ASSAY OF SERUM POTASSIUM: CPT

## 2021-06-22 PROCEDURE — 86923 COMPATIBILITY TEST ELECTRIC: CPT

## 2021-06-22 PROCEDURE — 85025 COMPLETE CBC W/AUTO DIFF WBC: CPT

## 2021-06-22 PROCEDURE — 81001 URINALYSIS AUTO W/SCOPE: CPT

## 2021-06-22 PROCEDURE — 82565 ASSAY OF CREATININE: CPT

## 2021-06-22 PROCEDURE — 85014 HEMATOCRIT: CPT

## 2021-06-22 PROCEDURE — 84443 ASSAY THYROID STIM HORMONE: CPT

## 2021-06-22 PROCEDURE — 82962 GLUCOSE BLOOD TEST: CPT

## 2021-06-22 PROCEDURE — 82947 ASSAY GLUCOSE BLOOD QUANT: CPT

## 2021-06-22 PROCEDURE — 83735 ASSAY OF MAGNESIUM: CPT

## 2021-06-22 PROCEDURE — 85384 FIBRINOGEN ACTIVITY: CPT

## 2021-06-22 PROCEDURE — U0005: CPT

## 2021-06-22 PROCEDURE — 83036 HEMOGLOBIN GLYCOSYLATED A1C: CPT

## 2021-06-22 PROCEDURE — 83880 ASSAY OF NATRIURETIC PEPTIDE: CPT

## 2021-06-22 PROCEDURE — C1887: CPT

## 2021-06-22 PROCEDURE — 97162 PT EVAL MOD COMPLEX 30 MIN: CPT

## 2021-06-22 PROCEDURE — 85576 BLOOD PLATELET AGGREGATION: CPT

## 2021-06-22 PROCEDURE — 87641 MR-STAPH DNA AMP PROBE: CPT

## 2021-06-22 PROCEDURE — 86769 SARS-COV-2 COVID-19 ANTIBODY: CPT

## 2021-06-22 PROCEDURE — C8929: CPT

## 2021-06-22 PROCEDURE — 94002 VENT MGMT INPAT INIT DAY: CPT

## 2021-06-22 PROCEDURE — 93458 L HRT ARTERY/VENTRICLE ANGIO: CPT

## 2021-06-22 PROCEDURE — C1751: CPT

## 2021-06-22 PROCEDURE — P9047: CPT

## 2021-06-22 PROCEDURE — 80053 COMPREHEN METABOLIC PANEL: CPT

## 2023-11-24 NOTE — AIRWAY REMOVAL NOTE  ADULT & PEDS - REASON ARTIFICAL AIRWAY REMOVED:
Pt had surgery in 2018. Pt states was following with Cardiology, last appointment was last yr. States now scheduled for 5yr follow up. reason for artificial airway has resolved

## 2024-05-29 NOTE — PROGRESS NOTE ADULT - ASSESSMENT
Subjective:      Naomy Vargas is a 76 y.o. female who presents with CKD          Chronic Kidney Disease  Pertinent negatives include no abdominal pain, chest pain, chills, congestion, coughing, fever, nausea or vomiting.     Naomy is coming today for f/u of CKD II  Doing well, no complaints.  No dysuria/hematuria/flank pain  Pedal edema improved -well controlled with low salt diet  No NSAID's  CKD -creat level improved -stable at baseline at 0.9   HTN: BP on lower side - stop amlodipine  CT abd from 2008 revealed scarred right kidney      Review of Systems   Constitutional:  Negative for chills, fever, malaise/fatigue and weight loss.   HENT:  Negative for congestion, hearing loss and sinus pain.    Eyes: Negative.    Respiratory:  Negative for cough, hemoptysis, shortness of breath and wheezing.    Cardiovascular:  Negative for chest pain, palpitations, orthopnea and leg swelling.   Gastrointestinal:  Negative for abdominal pain, nausea and vomiting.   Genitourinary:  Negative for dysuria, flank pain, frequency, hematuria and urgency.   Skin: Negative.    All other systems reviewed and are negative.    Past Medical History:   Diagnosis Date    Abnormal CT of the chest     Acute hypoxemic respiratory failure (HCC) 03/23/2023    Anxiety     Arthritis     wrists    Back pain     Blood transfusion without reported diagnosis     with ectopic preg x 2     Breath shortness     with exertion    Cataract     bilateral removal-Dr. Duke Talamantes    Cellulitis     right lower leg    Cellulitis of leg 11/01/2013    Followed by dermatology. Managed with fluocinonide and Bactroban on right leg Left leg she uses triamcinolone and Sarna pramocaine (anesthetic, antipruretic) on both.      Chickenpox     COPD (chronic obstructive pulmonary disease) ([facility])     Dental disorder     upper denture    Earache     Elevated troponin     Fatigue     Frequent urination     Hypertension 02/23/2018    on no meds at this time     This is a 69 y/o female with significant PMHx of former 5 pack year cigarette smoker, T2DM (HgA1c 9.2 this admission) managed by endocrinologist in [place], denies complications), hypothyroidism, HTN, HLD admitted to [facility] on 2/8/2021 with NSTEMI and transferred to [facility] 2/11/21 for LHC with Dr. Duggan.  LHC revealed 80-90% stenosis to LAD, 80-90% stenosis to prox diag, 80-90% to prox Cx, 70-80 to mid/distal RCA. CTS consult called to evaluate patient for cardiac surgery.  She is now POD #2 from CABGx3.    2/12 Pre op Cardiac Surgery in progress. D/C Brilinta.  P2Y12 94   2/13 VSS; rsr 60-70; stable at this time echo neg; carotids neg; repeat P2y12 in am; endo consulted for uncontrolled dm2- A1C 9.2; pt placed on lantus and admelog   continue asa/ statin/ b-blockers; hep gttp; ARB d/c preop   2/14 VSS - no c/o cp this am - maintain hep gtt & bb-will inc. to 50mg po bid-or tue  2/15 VSS- no chest pain today - continue on hep gtt  2/16 VSS- No chest pain- continue on hep gtt. Preop workup completed. Plan for CABG with Dr. Adhikari tomorrow.   2/17 s /p CABG, with JUAN CARLOS , LIMA-LAD, SVG-RCA, SVG-Circ   Post op hypotension, ivf, pressors  Extubated d 0  Hyperglycemia  insulin infusion  MS/ L pl remain in place  Transferred to sdu  2/19 Med and Lt pleural CT removed, CXR stable.  F/U TOV.  Beta blocker inc'd 25 BID to TID for . FS in 300's, insulin gtt restarted.    2/20 VVS; Continue with current medication regimen.  Insulin gtt d/c 2/2 increase agitation and confusion overnight  Patient continues hyperglycemic --> Lantus increased to 36 units and Admelog increased to 15 units SQ TID pre meal. K+ supplement.  Continue on a 1:1 for safety precautions. Lopressor increased to 50 mg PO TID.   Dispo: Home candidate.    2/21 VSS; Plan to move to floor, monitor till noon- plan to d'c 1:1 if no agitation or confusion.  Hypothyroidism     Influenza     Insomnia     Kidney disease     reports 1 kidney is smaller than the other    Mumps     OAB (overactive bladder) 2019    Pain 2018    right leg and back, 5/10    Peripheral vascular disease, unspecified ([facility]) 10/13/2021    Pneumonia     Rash     Ringing in ears     Sepsis ([facility]) 2023    Shortness of breath     Swelling of lower extremity     Thyroid disease     Tonsillitis     Toothache     Venous stasis dermatitis     right leg       Family History   Problem Relation Age of Onset    Arthritis Mother         hip fracture    Diabetes Mother     Cancer Mother         skin    Arthritis Father         lung    Alcohol/Drug Father         etoh    Lung Disease Sister         smoker/copd    Hypertension Sister     Other Brother         hep c    Arthritis Maternal Grandmother         hip fracture    Diabetes Maternal Grandfather     No Known Problems Son     No Known Problems Son     No Known Problems Brother     No Known Problems Sister     No Known Problems Brother     Hyperlipidemia Neg Hx     Stroke Neg Hx        Social History     Socioeconomic History    Marital status:     Number of children: 2   Occupational History    Occupation: retired   Tobacco Use    Smoking status: Former     Current packs/day: 0.00     Average packs/day: 0.5 packs/day for 53.0 years (26.5 ttl pk-yrs)     Types: Cigarettes     Start date: 1964     Quit date: 2017     Years since quittin.8    Smokeless tobacco: Never   Vaping Use    Vaping status: Never Used   Substance and Sexual Activity    Alcohol use: Yes     Comment: 2 per day    Drug use: No    Sexual activity: Not Currently     Partners: Male     Social Determinants of Health     Financial Resource Strain: Low Risk  (2024)    Overall Financial Resource Strain (CARDIA)     Difficulty of Paying Living Expenses: Not very hard   Food Insecurity: No Food Insecurity (2024)    Hunger Vital Sign     Worried About  "Running Out of Food in the Last Year: Never true     Ran Out of Food in the Last Year: Never true   Transportation Needs: Unmet Transportation Needs (2/21/2024)    PRAPARE - Transportation     Lack of Transportation (Medical): Yes     Lack of Transportation (Non-Medical): Yes   Physical Activity: Inactive (2/21/2024)    Exercise Vital Sign     Days of Exercise per Week: 0 days     Minutes of Exercise per Session: 0 min   Stress: No Stress Concern Present (2/21/2024)    [employer] [place] [employer] - Occupational Stress Questionnaire     Feeling of Stress : Only a little   Social Connections: Moderately Isolated (2/21/2024)    Social Connection and Isolation Panel [NHANES]     Frequency of Communication with Friends and Family: Twice a week     Frequency of Social Gatherings with Friends and Family: Once a week     Attends Mosque Services: Never     Active Member of Clubs or Organizations: No     Attends Club or Organization Meetings: Never     Marital Status:    Housing Stability: High Risk (2/21/2024)    Housing Stability Vital Sign     Unable to Pay for Housing in the Last Year: Yes     Number of Places Lived in the Last Year: 1     Unstable Housing in the Last Year: No          Objective:     /54 (BP Location: Right arm, Patient Position: Sitting, BP Cuff Size: Adult)   Pulse 77   Temp 36.7 °C (98.1 °F) (Temporal)   Ht 1.702 m (5' 7\")   Wt 68 kg (150 lb)   LMP  (LMP Unknown)   SpO2 94%   BMI 23.49 kg/m²      Physical Exam  Vitals reviewed.   Constitutional:       General: She is not in acute distress.     Appearance: Normal appearance. She is well-developed. She is not diaphoretic.   HENT:      Head: Normocephalic and atraumatic.      Nose: Nose normal.      Mouth/Throat:      Mouth: Mucous membranes are moist.      Pharynx: Oropharynx is clear.   Eyes:      Extraocular Movements: Extraocular movements intact.      Conjunctiva/sclera: Conjunctivae normal.      Pupils: Pupils are " equal, round, and reactive to light.   Cardiovascular:      Rate and Rhythm: Normal rate and regular rhythm.      Pulses: Normal pulses.      Heart sounds: Normal heart sounds.   Pulmonary:      Effort: Pulmonary effort is normal. No respiratory distress.      Breath sounds: Normal breath sounds. No wheezing or rales.   Abdominal:      General: Bowel sounds are normal. There is no distension.      Palpations: Abdomen is soft. There is no mass.      Tenderness: There is no abdominal tenderness. There is no right CVA tenderness or left CVA tenderness.   Musculoskeletal:      Cervical back: Normal range of motion and neck supple.      Right lower leg: No edema.      Left lower leg: No edema.   Skin:     General: Skin is warm.      Coloration: Skin is not pale.      Findings: No erythema or rash.   Neurological:      General: No focal deficit present.      Mental Status: She is alert and oriented to person, place, and time.      Cranial Nerves: No cranial nerve deficit.      Coordination: Coordination normal.   Psychiatric:         Mood and Affect: Mood normal.         Behavior: Behavior normal.         Thought Content: Thought content normal.         Judgment: Judgment normal.            Laboratory and renal imaging results reviewed : d/w pt   Latest Reference Range & Units 06/13/23 11:37 09/25/23 15:07 01/22/24 11:49 05/22/24 09:57   WBC 4.8 - 10.8 K/uL 7.8  6.4    RBC 4.20 - 5.40 M/uL 4.08 (L)  4.32    Hemoglobin 12.0 - 16.0 g/dL 13.2  13.2    Hematocrit 37.0 - 47.0 % 41.4  41.5    MCV 81.4 - 97.8 fL 101.5 (H)  96.1    MCH 27.0 - 33.0 pg 32.4  30.6    MCHC 32.2 - 35.5 g/dL 31.9 (L)  31.8 (L)    RDW 35.9 - 50.0 fL 62.1 (H)  50.8 (H)    Platelet Count 164 - 446 K/uL 134 (L)  162 (L)    MPV 9.0 - 12.9 fL 11.6  11.3    Neutrophils-Polys 44.00 - 72.00 %   65.80    Neutrophils (Absolute) 1.82 - 7.42 K/uL   4.19    Lymphocytes 22.00 - 41.00 %   17.50 (L)    Lymphs (Absolute) 1.00 - 4.80 K/uL   1.11    Monocytes 0.00 - 13.40  %   12.40    Monos (Absolute) 0.00 - 0.85 K/uL   0.79    Eosinophils 0.00 - 6.90 %   3.50    Eos (Absolute) 0.00 - 0.51 K/uL   0.22    Basophils 0.00 - 1.80 %   0.50    Baso (Absolute) 0.00 - 0.12 K/uL   0.03    Immature Granulocytes 0.00 - 0.90 %   0.30    Immature Granulocytes (abs) 0.00 - 0.11 K/uL   0.02    Nucleated RBC 0.00 - 0.20 /100 WBC   0.00    NRBC (Absolute) K/uL   0.00    Sodium 135 - 145 mmol/L 143  137 137   Potassium 3.6 - 5.5 mmol/L 4.5  4.6 4.0   Chloride 96 - 112 mmol/L 105  100 100   Co2 20 - 33 mmol/L 23  22 24   Anion Gap 7.0 - 16.0  15.0  15.0 13.0   Glucose 65 - 99 mg/dL 116 (H)  81 114 (H)   Bun 8 - 22 mg/dL 27 (H)  25 (H) 22   Creatinine 0.50 - 1.40 mg/dL 0.98  1.09 0.91   GFR (CKD-EPI) >60 mL/min/1.73 m 2 61  53 ! 66   Calcium 8.5 - 10.5 mg/dL 9.3  8.8 9.3   Carbamylated Protein Antibody, IgG 0 - 19 Units  9     Correct Calcium 8.5 - 10.5 mg/dL   8.9    AST(SGOT) 12 - 45 U/L   25    ALT(SGPT) 2 - 50 U/L   15    Alkaline Phosphatase 30 - 99 U/L   93    Total Bilirubin 0.1 - 1.5 mg/dL   0.5    Albumin 3.2 - 4.9 g/dL   3.9    Total Protein 6.0 - 8.2 g/dL   7.4    Globulin 1.9 - 3.5 g/dL   3.5    A-G Ratio g/dL   1.1    NT-proBNP 0 - 125 pg/mL   703 (H)    Micro Alb Creat Ratio 0 - 30 mg/g see below      Creatinine, Urine mg/dL 58.03      Microalbumin, Urine Random mg/dL <1.2      Urobilinogen, Urine Negative  1.0   1.0   Color  Yellow   Yellow   Character  Clear   Clear   Specific Gravity <1.035  1.015   1.013   Ph 5.0 - 8.0  6.5   7.5   Glucose Negative mg/dL Negative   Negative   Ketones Negative mg/dL Negative   Negative   Bilirubin Negative  Negative   Negative   Occult Blood Negative  Negative   Negative   Protein Negative mg/dL Negative   Negative   Nitrite Negative  Negative   Negative   Leukocyte Esterase Negative  Trace !   Small !   Micro Urine Req  Microscopic   Microscopic   WBC /hpf 0-2   10-20 !   RBC /hpf 0-2   0-2   Epithelial Cells /hpf Negative   Negative   Bacteria None  /hpf Negative   Negative   Hyaline Cast /lpf 0-2   0-2   25-Hydroxy   Vitamin D 25 30 - 100 ng/mL 58   60   TSH 0.380 - 5.330 uIU/mL   0.520    Cyclic Citrullinated Peptide Ab, IgG/A 0 - 19 Units  8     Pth, Intact 14.0 - 72.0 pg/mL    66.0   (L): Data is abnormally low  (H): Data is abnormally high  !: Data is abnormal     Assessment/Plan:       1. CKD (chronic kidney disease) stage II, GFR > 60 ml/min ([facility])       Creat level improved -stable at  baseline- to monitor closely      Keep well hydrated    2. Vitamin D deficiency      Well controlled      PTH WNL    3. Essential hypertension      Low BP -stopped amlodipine      To monitor BP at home nd call clinic if BP> 135/85 -plan to increase Lisinopril dose       to 40 mg    4. Microalbuminuria      Very mild -on ACEi      5. Anemia, unspecified type      Hb stable WNL    6. Right hydronephrosis - stable -f/u with Urology    Recs:  Stop amlodipine  If BP > 135/85 increase Lisinopril to 40 mg daily  Keep well hydrated  Low salt diet  Avoid NSAID's  F/u in 4 months       This is a 71 y/o female with significant PMHx of former 5 pack year cigarette smoker, T2DM (HgA1c 9.2 this admission) managed by endocrinologist in [place], denies complications), hypothyroidism, HTN, HLD admitted to [facility] on 2/8/2021 with NSTEMI and transferred to [facility] 2/11/21 for LHC with Dr. Duggan.  LHC revealed 80-90% stenosis to LAD, 80-90% stenosis to prox diag, 80-90% to prox Cx, 70-80 to mid/distal RCA. CTS consult called to evaluate patient for cardiac surgery.  She is now POD #2 from CABGx3.    2/12 Pre op Cardiac Surgery in progress. D/C Brilinta.  P2Y12 94   2/13 VSS; rsr 60-70; stable at this time echo neg; carotids neg; repeat P2y12 in am; endo consulted for uncontrolled dm2- A1C 9.2; pt placed on lantus and admelog   continue asa/ statin/ b-blockers; hep gttp; ARB d/c preop   2/14 VSS - no c/o cp this am - maintain hep gtt & bb-will inc. to 50mg po bid-or tue  2/15 VSS- no chest pain today - continue on hep gtt  2/16 VSS- No chest pain- continue on hep gtt. Preop workup completed. Plan for CABG with Dr. Adhikari tomorrow.   2/17 s /p CABG, with JUAN CARLOS , LIMA-LAD, SVG-RCA, SVG-Circ   Post op hypotension, ivf, pressors  Extubated d 0  Hyperglycemia  insulin infusion  MS/ L pl remain in place  Transferred to sdu  2/19 Med and Lt pleural CT removed, CXR stable.  F/U TOV.  Beta blocker inc'd 25 BID to TID for . FS in 300's, insulin gtt restarted.    2/20 VVS; Continue with current medication regimen.  Insulin gtt d/c 2/2 increase agitation and confusion overnight  Patient continues hyperglycemic --> Lantus increased to 36 units and Admelog increased to 15 units SQ TID pre meal. K+ supplement.  Continue on a 1:1 for safety precautions. Lopressor increased to 50 mg PO TID.   Dispo: Home candidate.    2/21 VSS; Plan to move to floor, monitor till noon- plan to d'c 1:1 if no agitation or confusion.   2/22 VSS; increase activity as tolerated; sorbitol for bm; pw d/c  Discharge planning- home tue if stable